# Patient Record
Sex: FEMALE | Race: WHITE | NOT HISPANIC OR LATINO | Employment: OTHER | URBAN - METROPOLITAN AREA
[De-identification: names, ages, dates, MRNs, and addresses within clinical notes are randomized per-mention and may not be internally consistent; named-entity substitution may affect disease eponyms.]

---

## 2020-06-25 ENCOUNTER — APPOINTMENT (EMERGENCY)
Dept: RADIOLOGY | Facility: HOSPITAL | Age: 75
DRG: 441 | End: 2020-06-25
Payer: MEDICARE

## 2020-06-25 ENCOUNTER — HOSPITAL ENCOUNTER (INPATIENT)
Facility: HOSPITAL | Age: 75
LOS: 8 days | Discharge: NURSING, REHAB OR LONG-TERM CARE | DRG: 441 | End: 2020-07-03
Attending: EMERGENCY MEDICINE | Admitting: INTERNAL MEDICINE
Payer: MEDICARE

## 2020-06-25 DIAGNOSIS — T39.1X1A TYLENOL OVERDOSE, ACCIDENTAL OR UNINTENTIONAL, INITIAL ENCOUNTER: ICD-10-CM

## 2020-06-25 DIAGNOSIS — F41.9 ANXIETY DISORDER: ICD-10-CM

## 2020-06-25 DIAGNOSIS — N17.9 AKI (ACUTE KIDNEY INJURY) (HCC): ICD-10-CM

## 2020-06-25 DIAGNOSIS — R60.9 SWELLING: ICD-10-CM

## 2020-06-25 DIAGNOSIS — S91.109S WOUND, OPEN, TOE WITH COMPLICATION, SEQUELA: ICD-10-CM

## 2020-06-25 DIAGNOSIS — L97.519 FOOT ULCER, RIGHT, WITH UNSPECIFIED SEVERITY (HCC): ICD-10-CM

## 2020-06-25 DIAGNOSIS — S92.919A FRACTURE, TOE: ICD-10-CM

## 2020-06-25 DIAGNOSIS — R79.89 ELEVATED LFTS: ICD-10-CM

## 2020-06-25 DIAGNOSIS — F32.A DEPRESSION: ICD-10-CM

## 2020-06-25 DIAGNOSIS — R09.02 HYPOXIA: ICD-10-CM

## 2020-06-25 DIAGNOSIS — R74.01 TRANSAMINITIS: ICD-10-CM

## 2020-06-25 DIAGNOSIS — J81.1 PULMONARY EDEMA: Primary | ICD-10-CM

## 2020-06-25 DIAGNOSIS — R41.89 IMPAIRED COGNITION: ICD-10-CM

## 2020-06-25 DIAGNOSIS — R77.8 ELEVATED TROPONIN: ICD-10-CM

## 2020-06-25 PROBLEM — K72.00 ACUTE HEPATIC ENCEPHALOPATHY: Status: ACTIVE | Noted: 2020-06-25

## 2020-06-25 PROBLEM — G93.40 ENCEPHALOPATHY: Status: ACTIVE | Noted: 2020-06-25

## 2020-06-25 PROBLEM — R79.1 ELEVATED INR: Status: ACTIVE | Noted: 2020-06-25

## 2020-06-25 PROBLEM — J96.01 ACUTE RESPIRATORY FAILURE WITH HYPOXIA AND HYPERCAPNIA (HCC): Status: ACTIVE | Noted: 2020-06-25

## 2020-06-25 PROBLEM — J96.02 ACUTE RESPIRATORY FAILURE WITH HYPOXIA AND HYPERCAPNIA (HCC): Status: ACTIVE | Noted: 2020-06-25

## 2020-06-25 PROBLEM — I50.9 ACUTE CONGESTIVE HEART FAILURE (HCC): Status: ACTIVE | Noted: 2020-06-25

## 2020-06-25 PROBLEM — D72.829 LEUKOCYTOSIS: Status: ACTIVE | Noted: 2020-06-25

## 2020-06-25 LAB
ALBUMIN SERPL BCP-MCNC: 3.4 G/DL (ref 3.5–5)
ALP SERPL-CCNC: 111 U/L (ref 46–116)
ALT SERPL W P-5'-P-CCNC: 2164 U/L (ref 12–78)
AMMONIA PLAS-SCNC: 18 UMOL/L (ref 11–35)
ANION GAP SERPL CALCULATED.3IONS-SCNC: 13 MMOL/L (ref 4–13)
APAP SERPL-MCNC: <2 UG/ML (ref 10–20)
AST SERPL W P-5'-P-CCNC: 3174 U/L (ref 5–45)
ATRIAL RATE: 100 BPM
ATRIAL RATE: 95 BPM
BACTERIA UR QL AUTO: ABNORMAL /HPF
BASE EX.OXY STD BLDV CALC-SCNC: 73.1 % (ref 60–80)
BASE EXCESS BLDV CALC-SCNC: -2.7 MMOL/L
BASOPHILS # BLD AUTO: 0.02 THOUSANDS/ΜL (ref 0–0.1)
BASOPHILS NFR BLD AUTO: 0 % (ref 0–1)
BILIRUB SERPL-MCNC: 0.69 MG/DL (ref 0.2–1)
BILIRUB UR QL STRIP: NEGATIVE
BUN SERPL-MCNC: 51 MG/DL (ref 5–25)
CALCIUM SERPL-MCNC: 8.1 MG/DL (ref 8.3–10.1)
CHLORIDE SERPL-SCNC: 101 MMOL/L (ref 100–108)
CK MB SERPL-MCNC: 1.1 % (ref 0–2.5)
CK MB SERPL-MCNC: 13.7 NG/ML (ref 0–5)
CK SERPL-CCNC: 1194 U/L (ref 26–192)
CLARITY UR: CLEAR
CO2 SERPL-SCNC: 24 MMOL/L (ref 21–32)
COLOR UR: YELLOW
COLOR, POC: NORMAL
CREAT SERPL-MCNC: 2.48 MG/DL (ref 0.6–1.3)
EOSINOPHIL # BLD AUTO: 0 THOUSAND/ΜL (ref 0–0.61)
EOSINOPHIL NFR BLD AUTO: 0 % (ref 0–6)
ERYTHROCYTE [DISTWIDTH] IN BLOOD BY AUTOMATED COUNT: 13.7 % (ref 11.6–15.1)
ETHANOL SERPL-MCNC: <3 MG/DL (ref 0–3)
FINE GRAN CASTS URNS QL MICRO: ABNORMAL /LPF
GFR SERPL CREATININE-BSD FRML MDRD: 18 ML/MIN/1.73SQ M
GLUCOSE SERPL-MCNC: 160 MG/DL (ref 65–140)
GLUCOSE SERPL-MCNC: 163 MG/DL (ref 65–140)
GLUCOSE UR STRIP-MCNC: NEGATIVE MG/DL
HCO3 BLDV-SCNC: 25 MMOL/L (ref 24–30)
HCT VFR BLD AUTO: 39.2 % (ref 34.8–46.1)
HGB BLD-MCNC: 12.6 G/DL (ref 11.5–15.4)
HGB UR QL STRIP.AUTO: ABNORMAL
HYALINE CASTS #/AREA URNS LPF: ABNORMAL /LPF
IMM GRANULOCYTES # BLD AUTO: 0.23 THOUSAND/UL (ref 0–0.2)
IMM GRANULOCYTES NFR BLD AUTO: 1 % (ref 0–2)
INR PPP: 1.53 (ref 0.84–1.19)
KETONES UR STRIP-MCNC: NEGATIVE MG/DL
LACTATE SERPL-SCNC: 1.9 MMOL/L (ref 0.5–2)
LACTATE SERPL-SCNC: 3.8 MMOL/L (ref 0.5–2)
LEUKOCYTE ESTERASE UR QL STRIP: NEGATIVE
LYMPHOCYTES # BLD AUTO: 1.85 THOUSANDS/ΜL (ref 0.6–4.47)
LYMPHOCYTES NFR BLD AUTO: 8 % (ref 14–44)
MCH RBC QN AUTO: 32.1 PG (ref 26.8–34.3)
MCHC RBC AUTO-ENTMCNC: 32.1 G/DL (ref 31.4–37.4)
MCV RBC AUTO: 100 FL (ref 82–98)
MONOCYTES # BLD AUTO: 1.46 THOUSAND/ΜL (ref 0.17–1.22)
MONOCYTES NFR BLD AUTO: 6 % (ref 4–12)
NEUTROPHILS # BLD AUTO: 20.3 THOUSANDS/ΜL (ref 1.85–7.62)
NEUTS SEG NFR BLD AUTO: 85 % (ref 43–75)
NITRITE UR QL STRIP: NEGATIVE
NON-SQ EPI CELLS URNS QL MICRO: ABNORMAL /HPF
NRBC BLD AUTO-RTO: 0 /100 WBCS
NT-PROBNP SERPL-MCNC: ABNORMAL PG/ML
O2 CT BLDV-SCNC: 14 ML/DL
P AXIS: 58 DEGREES
P AXIS: 63 DEGREES
PCO2 BLDV: 55.8 MM HG (ref 42–50)
PH BLDV: 7.27 [PH] (ref 7.3–7.4)
PH UR STRIP.AUTO: 6 [PH] (ref 4.5–8)
PLATELET # BLD AUTO: 290 THOUSANDS/UL (ref 149–390)
PMV BLD AUTO: 12.5 FL (ref 8.9–12.7)
PO2 BLDV: 49.8 MM HG (ref 35–45)
POTASSIUM SERPL-SCNC: 3.5 MMOL/L (ref 3.5–5.3)
PR INTERVAL: 122 MS
PR INTERVAL: 128 MS
PROT SERPL-MCNC: 8.2 G/DL (ref 6.4–8.2)
PROT UR STRIP-MCNC: >=300 MG/DL
PROTHROMBIN TIME: 18.4 SECONDS (ref 11.6–14.5)
QRS AXIS: 79 DEGREES
QRS AXIS: 85 DEGREES
QRSD INTERVAL: 78 MS
QRSD INTERVAL: 78 MS
QT INTERVAL: 406 MS
QT INTERVAL: 424 MS
QTC INTERVAL: 523 MS
QTC INTERVAL: 532 MS
RBC # BLD AUTO: 3.92 MILLION/UL (ref 3.81–5.12)
RBC #/AREA URNS AUTO: ABNORMAL /HPF
SALICYLATES SERPL-MCNC: <3 MG/DL (ref 3–20)
SARS-COV-2 RNA RESP QL NAA+PROBE: NEGATIVE
SODIUM SERPL-SCNC: 138 MMOL/L (ref 136–145)
SP GR UR STRIP.AUTO: >=1.03 (ref 1–1.03)
T WAVE AXIS: 237 DEGREES
T WAVE AXIS: 237 DEGREES
TROPONIN I SERPL-MCNC: 10.2 NG/ML
TROPONIN I SERPL-MCNC: 11 NG/ML
TSH SERPL DL<=0.05 MIU/L-ACNC: 2.12 UIU/ML (ref 0.36–3.74)
UROBILINOGEN UR QL STRIP.AUTO: 0.2 E.U./DL
VENTRICULAR RATE: 100 BPM
VENTRICULAR RATE: 95 BPM
WBC # BLD AUTO: 23.86 THOUSAND/UL (ref 4.31–10.16)
WBC #/AREA URNS AUTO: ABNORMAL /HPF

## 2020-06-25 PROCEDURE — 99285 EMERGENCY DEPT VISIT HI MDM: CPT

## 2020-06-25 PROCEDURE — 82805 BLOOD GASES W/O2 SATURATION: CPT | Performed by: EMERGENCY MEDICINE

## 2020-06-25 PROCEDURE — 71250 CT THORAX DX C-: CPT

## 2020-06-25 PROCEDURE — U0003 INFECTIOUS AGENT DETECTION BY NUCLEIC ACID (DNA OR RNA); SEVERE ACUTE RESPIRATORY SYNDROME CORONAVIRUS 2 (SARS-COV-2) (CORONAVIRUS DISEASE [COVID-19]), AMPLIFIED PROBE TECHNIQUE, MAKING USE OF HIGH THROUGHPUT TECHNOLOGIES AS DESCRIBED BY CMS-2020-01-R: HCPCS | Performed by: EMERGENCY MEDICINE

## 2020-06-25 PROCEDURE — 99291 CRITICAL CARE FIRST HOUR: CPT | Performed by: EMERGENCY MEDICINE

## 2020-06-25 PROCEDURE — 82550 ASSAY OF CK (CPK): CPT | Performed by: EMERGENCY MEDICINE

## 2020-06-25 PROCEDURE — 83605 ASSAY OF LACTIC ACID: CPT | Performed by: EMERGENCY MEDICINE

## 2020-06-25 PROCEDURE — 84484 ASSAY OF TROPONIN QUANT: CPT | Performed by: EMERGENCY MEDICINE

## 2020-06-25 PROCEDURE — 82948 REAGENT STRIP/BLOOD GLUCOSE: CPT

## 2020-06-25 PROCEDURE — 70450 CT HEAD/BRAIN W/O DYE: CPT

## 2020-06-25 PROCEDURE — 99223 1ST HOSP IP/OBS HIGH 75: CPT | Performed by: INTERNAL MEDICINE

## 2020-06-25 PROCEDURE — 85610 PROTHROMBIN TIME: CPT | Performed by: EMERGENCY MEDICINE

## 2020-06-25 PROCEDURE — 84443 ASSAY THYROID STIM HORMONE: CPT | Performed by: EMERGENCY MEDICINE

## 2020-06-25 PROCEDURE — 82140 ASSAY OF AMMONIA: CPT | Performed by: EMERGENCY MEDICINE

## 2020-06-25 PROCEDURE — 93010 ELECTROCARDIOGRAM REPORT: CPT | Performed by: INTERNAL MEDICINE

## 2020-06-25 PROCEDURE — 74176 CT ABD & PELVIS W/O CONTRAST: CPT

## 2020-06-25 PROCEDURE — 94002 VENT MGMT INPAT INIT DAY: CPT

## 2020-06-25 PROCEDURE — 82553 CREATINE MB FRACTION: CPT | Performed by: EMERGENCY MEDICINE

## 2020-06-25 PROCEDURE — 80053 COMPREHEN METABOLIC PANEL: CPT | Performed by: EMERGENCY MEDICINE

## 2020-06-25 PROCEDURE — 96361 HYDRATE IV INFUSION ADD-ON: CPT

## 2020-06-25 PROCEDURE — 83880 ASSAY OF NATRIURETIC PEPTIDE: CPT | Performed by: PHYSICIAN ASSISTANT

## 2020-06-25 PROCEDURE — 94760 N-INVAS EAR/PLS OXIMETRY 1: CPT

## 2020-06-25 PROCEDURE — 80320 DRUG SCREEN QUANTALCOHOLS: CPT | Performed by: EMERGENCY MEDICINE

## 2020-06-25 PROCEDURE — 36415 COLL VENOUS BLD VENIPUNCTURE: CPT | Performed by: EMERGENCY MEDICINE

## 2020-06-25 PROCEDURE — 93005 ELECTROCARDIOGRAM TRACING: CPT

## 2020-06-25 PROCEDURE — 81001 URINALYSIS AUTO W/SCOPE: CPT

## 2020-06-25 PROCEDURE — 85025 COMPLETE CBC W/AUTO DIFF WBC: CPT | Performed by: EMERGENCY MEDICINE

## 2020-06-25 PROCEDURE — 80329 ANALGESICS NON-OPIOID 1 OR 2: CPT | Performed by: EMERGENCY MEDICINE

## 2020-06-25 PROCEDURE — 96374 THER/PROPH/DIAG INJ IV PUSH: CPT

## 2020-06-25 RX ORDER — METOPROLOL TARTRATE 50 MG/1
100 TABLET, FILM COATED ORAL EVERY 12 HOURS SCHEDULED
Status: DISCONTINUED | OUTPATIENT
Start: 2020-06-25 | End: 2020-06-29

## 2020-06-25 RX ORDER — LEVOTHYROXINE SODIUM 0.12 MG/1
125 TABLET ORAL
Status: DISCONTINUED | OUTPATIENT
Start: 2020-06-26 | End: 2020-07-03 | Stop reason: HOSPADM

## 2020-06-25 RX ORDER — ONDANSETRON 2 MG/ML
4 INJECTION INTRAMUSCULAR; INTRAVENOUS EVERY 6 HOURS PRN
Status: DISCONTINUED | OUTPATIENT
Start: 2020-06-25 | End: 2020-07-03 | Stop reason: HOSPADM

## 2020-06-25 RX ORDER — CIPROFLOXACIN 750 MG/1
750 TABLET, FILM COATED ORAL EVERY 24 HOURS
COMMUNITY
End: 2020-07-03 | Stop reason: HOSPADM

## 2020-06-25 RX ORDER — FUROSEMIDE 10 MG/ML
40 INJECTION INTRAMUSCULAR; INTRAVENOUS ONCE
Status: COMPLETED | OUTPATIENT
Start: 2020-06-25 | End: 2020-06-26

## 2020-06-25 RX ORDER — AMLODIPINE BESYLATE 5 MG/1
5 TABLET ORAL DAILY
Status: DISCONTINUED | OUTPATIENT
Start: 2020-06-26 | End: 2020-06-29

## 2020-06-25 RX ORDER — LEVOTHYROXINE SODIUM 0.12 MG/1
125 TABLET ORAL DAILY
COMMUNITY

## 2020-06-25 RX ORDER — ATORVASTATIN CALCIUM 10 MG/1
10 TABLET, FILM COATED ORAL DAILY
COMMUNITY
End: 2020-07-03 | Stop reason: HOSPADM

## 2020-06-25 RX ORDER — METOPROLOL TARTRATE 100 MG/1
100 TABLET ORAL EVERY 12 HOURS SCHEDULED
COMMUNITY
End: 2020-07-03 | Stop reason: HOSPADM

## 2020-06-25 RX ORDER — AMLODIPINE BESYLATE 5 MG/1
5 TABLET ORAL DAILY
COMMUNITY
End: 2020-07-03 | Stop reason: HOSPADM

## 2020-06-25 RX ORDER — LANOLIN ALCOHOL/MO/W.PET/CERES
3 CREAM (GRAM) TOPICAL
Status: DISCONTINUED | OUTPATIENT
Start: 2020-06-25 | End: 2020-06-28

## 2020-06-25 RX ORDER — HEPARIN SODIUM 1000 [USP'U]/ML
4000 INJECTION, SOLUTION INTRAVENOUS; SUBCUTANEOUS ONCE
Status: COMPLETED | OUTPATIENT
Start: 2020-06-25 | End: 2020-06-25

## 2020-06-25 RX ORDER — SODIUM CHLORIDE, SODIUM GLUCONATE, SODIUM ACETATE, POTASSIUM CHLORIDE, MAGNESIUM CHLORIDE, SODIUM PHOSPHATE, DIBASIC, AND POTASSIUM PHOSPHATE .53; .5; .37; .037; .03; .012; .00082 G/100ML; G/100ML; G/100ML; G/100ML; G/100ML; G/100ML; G/100ML
100 INJECTION, SOLUTION INTRAVENOUS CONTINUOUS
Status: DISCONTINUED | OUTPATIENT
Start: 2020-06-25 | End: 2020-06-26

## 2020-06-25 RX ORDER — TRAZODONE HYDROCHLORIDE 100 MG/1
200 TABLET ORAL
COMMUNITY
End: 2020-07-03 | Stop reason: HOSPADM

## 2020-06-25 RX ORDER — MAGNESIUM HYDROXIDE/ALUMINUM HYDROXICE/SIMETHICONE 120; 1200; 1200 MG/30ML; MG/30ML; MG/30ML
5 SUSPENSION ORAL EVERY 6 HOURS PRN
Status: DISCONTINUED | OUTPATIENT
Start: 2020-06-25 | End: 2020-07-03 | Stop reason: HOSPADM

## 2020-06-25 RX ORDER — HEPARIN SODIUM 10000 [USP'U]/100ML
3-20 INJECTION, SOLUTION INTRAVENOUS
Status: DISCONTINUED | OUTPATIENT
Start: 2020-06-25 | End: 2020-06-25

## 2020-06-25 RX ORDER — HEPARIN SODIUM 5000 [USP'U]/ML
5000 INJECTION, SOLUTION INTRAVENOUS; SUBCUTANEOUS EVERY 8 HOURS SCHEDULED
Status: DISCONTINUED | OUTPATIENT
Start: 2020-06-25 | End: 2020-07-03 | Stop reason: HOSPADM

## 2020-06-25 RX ADMIN — HEPARIN SODIUM 4000 UNITS: 1000 INJECTION INTRAVENOUS; SUBCUTANEOUS at 21:41

## 2020-06-25 RX ADMIN — SODIUM CHLORIDE 1000 ML: 0.9 INJECTION, SOLUTION INTRAVENOUS at 17:09

## 2020-06-25 RX ADMIN — SODIUM CHLORIDE, SODIUM GLUCONATE, SODIUM ACETATE, POTASSIUM CHLORIDE, MAGNESIUM CHLORIDE, SODIUM PHOSPHATE, DIBASIC, AND POTASSIUM PHOSPHATE 100 ML/HR: .53; .5; .37; .037; .03; .012; .00082 INJECTION, SOLUTION INTRAVENOUS at 23:43

## 2020-06-25 RX ADMIN — ACETYLCYSTEINE 11740 MG: 200 INJECTION, SOLUTION INTRAVENOUS at 23:32

## 2020-06-25 NOTE — ED ATTENDING ATTESTATION
6/25/2020  ICyndi MD, saw and evaluated the patient  I have discussed the patient with the resident/non-physician practitioner and agree with the resident's/non-physician practitioner's findings, Plan of Care, and MDM as documented in the resident's/non-physician practitioner's note, except where noted  All available labs and Radiology studies were reviewed  I was present for key portions of any procedure(s) performed by the resident/non-physician practitioner and I was immediately available to provide assistance  At this point I agree with the current assessment done in the Emergency Department  I have conducted an independent evaluation of this patient a history and physical is as follows:    ED Course         Critical Care Time  CriticalCare Time  Performed by: Cyndi Roche MD  Authorized by: Cyndi Roche MD     Critical care provider statement:     Critical care time (minutes):  33    Critical care was necessary to treat or prevent imminent or life-threatening deterioration of the following conditions:  Respiratory failure and cardiac failure    Critical care was time spent personally by me on the following activities:  Obtaining history from patient or surrogate, development of treatment plan with patient or surrogate, review of old charts, ordering and review of laboratory studies and ordering and review of radiographic studies        65 yo female found by family unresponsive and cyanotic around lips, in respiratory distress  Pt was hypoxic and ems placed on cpap with improvement of mental status, oxygneation  Pt niece states she was recently admitted to [facility] for social reasons and has been home with her for a few weeks  Pt with hx of asthma, htn, depression but per niece all her pills are intact and no concern for overdose  Pt with no recent fever, n/v/d, complaints of cp, headache, sob until today    Discuss with pt daughter over phone her code status and she is full code   Vss, afebrile, lungs wheezing bilaterally, rrr, abodmen soft nontender, no pedal edema, no focal neuro deficits  Cardiac workup, ct pe study, ekg with t wave inversions and troponin elevated, vbg, bipap

## 2020-06-25 NOTE — ED PROVIDER NOTES
History  Chief Complaint   Patient presents with    Shortness of Breath     pt coming form home, found cyanotic and unresponsive      Patient presents for evaluation of hypoxia, decreased responsiveness, and cyanosis  History is provided by patient's niece who is bedside  She states that the patient was discharged from another hospital couple weeks ago and she has been helping her get back on her feet  Yesterday she did not see the patient but states that is normal for her to sleep throughout the whole day  When she discovered her this afternoon she was minimally responsive and appeared blue  She then called EMS  EMS reports that the patient was saturating at approximately 80% when they arrived, grossly cyanotic on admit minimally responsive  They then applied nasal cannula and the patient's saturations slowly increased  Her mental status improved as her oxygen levels rays  A eventually applied CPAP and were able to increase the patient's oxygen saturation to approximately 90%  Upon her arrival here the patient has no complaints but is minimally interactive  Prior to Admission Medications   Prescriptions Last Dose Informant Patient Reported? Taking? amLODIPine (NORVASC) 5 mg tablet   Yes Yes   Sig: Take 5 mg by mouth daily   atorvastatin (LIPITOR) 10 mg tablet   Yes Yes   Sig: Take 10 mg by mouth daily   ciprofloxacin (CIPRO) 750 mg tablet   Yes Yes   Sig: Take 750 mg by mouth every 24 hours   levothyroxine 125 mcg tablet   Yes Yes   Sig: Take 125 mcg by mouth daily   metoprolol tartrate (LOPRESSOR) 100 mg tablet   Yes Yes   Sig: Take 100 mg by mouth every 12 (twelve) hours   traZODone (DESYREL) 100 mg tablet   Yes Yes   Sig: Take 200 mg by mouth daily at bedtime      Facility-Administered Medications: None       Past Medical History:   Diagnosis Date    Disease of thyroid gland     Hyperlipemia     Hypertension        History reviewed  No pertinent surgical history  History reviewed   No pertinent family history  I have reviewed and agree with the history as documented  E-Cigarette/Vaping    E-Cigarette Use Never User      E-Cigarette/Vaping Substances     Social History     Tobacco Use    Smoking status: Never Smoker    Smokeless tobacco: Never Used   Substance Use Topics    Alcohol use: Not Currently    Drug use: Never        Review of Systems   Unable to perform ROS: Acuity of condition       Physical Exam  ED Triage Vitals   Temperature Pulse Respirations Blood Pressure SpO2   06/25/20 1541 06/25/20 1534 06/25/20 1534 06/25/20 1534 06/25/20 1530   99 1 °F (37 3 °C) 98 (!) 26 158/84 96 %      Temp Source Heart Rate Source Patient Position - Orthostatic VS BP Location FiO2 (%)   06/25/20 1541 06/25/20 1534 06/25/20 1534 06/25/20 1534 --   Tympanic Monitor Lying Right arm       Pain Score       06/25/20 2357       No Pain             Orthostatic Vital Signs  Vitals:    06/25/20 2030 06/25/20 2100 06/25/20 2200 06/25/20 2326   BP: 147/72 153/74 158/75 130/86   Pulse: 92 91 92 93   Patient Position - Orthostatic VS:           Physical Exam   Constitutional: She appears well-nourished  No distress  HENT:   Head: Normocephalic and atraumatic  Right Ear: External ear normal    Left Ear: External ear normal    Mouth/Throat: Oropharynx is clear and moist    No obvious signs of head trauma   Eyes: Pupils are equal, round, and reactive to light  Conjunctivae and EOM are normal  Right eye exhibits no discharge  Left eye exhibits no discharge  No scleral icterus  Neck: Normal range of motion  Neck supple  No JVD present  Cardiovascular: Regular rhythm, normal heart sounds and intact distal pulses  Tachycardia present  Exam reveals no gallop and no friction rub  No murmur heard  Pulmonary/Chest: Effort normal  No respiratory distress  She has no wheezes  She has rhonchi in the right upper field and the right middle field  She has no rales  Abdominal: Soft   Bowel sounds are normal  She exhibits no distension and no mass  There is no tenderness  There is no guarding  Musculoskeletal: Normal range of motion  She exhibits no tenderness or deformity  Neurological: No cranial nerve deficit or sensory deficit  She exhibits normal muscle tone  Coordination normal    Patient will follow commands when prompted multiple times, as mental status improved could answer questions  No obvious focal neural deficits   Skin: Skin is warm  She is not diaphoretic  Psychiatric: She has a normal mood and affect  Her behavior is normal  Judgment and thought content normal    Vitals reviewed        ED Medications  Medications   amLODIPine (NORVASC) tablet 5 mg (has no administration in time range)   levothyroxine tablet 125 mcg (125 mcg Oral Given 6/26/20 0637)   metoprolol tartrate (LOPRESSOR) tablet 100 mg (100 mg Oral Given 6/26/20 0000)   melatonin tablet 3 mg (3 mg Oral Given 6/26/20 0032)   multi-electrolyte (PLASMALYTE-A/ISOLYTE-S PH 7 4) IV solution (100 mL/hr Intravenous New Bag 6/25/20 2343)   ondansetron (ZOFRAN) injection 4 mg (has no administration in time range)   aluminum-magnesium hydroxide-simethicone (MYLANTA) 200-200-20 mg/5 mL oral suspension 5 mL (has no administration in time range)   heparin (porcine) subcutaneous injection 5,000 Units (5,000 Units Subcutaneous Given 6/26/20 0637)   acetylcysteine (ACETADOTE) 7,840 mg in dextrose 5 % 1,000 mL IVPB (7,840 mg Intravenous New Bag 6/26/20 0639)   sodium chloride 0 9 % bolus 1,000 mL (0 mL Intravenous Stopped 6/25/20 1844)   heparin (porcine) injection 4,000 Units (4,000 Units Intravenous Given 6/25/20 2141)   furosemide (LASIX) injection 40 mg (40 mg Intravenous Given 6/26/20 0033)   acetylcysteine (ACETADOTE) 11,740 mg in dextrose 5 % 200 mL IVPB (11,740 mg Intravenous New Bag 6/25/20 2332)   acetylcysteine (ACETADOTE) 3,920 mg in dextrose 5 % 500 mL IVPB (3,920 mg Intravenous New Bag 6/26/20 0046)       Diagnostic Studies  Results Reviewed Procedure Component Value Units Date/Time    Ammonia [[id number]]  (Normal) Collected:  06/25/20 2233    Lab Status:  Final result Specimen:  Blood from Arm, Left Updated:  06/25/20 2303     Ammonia 18 umol/L     CKMB [[id number]]  (Abnormal) Collected:  06/25/20 1532    Lab Status:  Final result Specimen:  Blood from Arm, Left Updated:  06/25/20 2238     CK-MB Index 1 1 %      CK-MB 13 7 ng/mL     CK (with reflex to MB) [[id number]]  (Abnormal) Collected:  06/25/20 1532    Lab Status:  Final result Specimen:  Blood from Arm, Left Updated:  06/25/20 2224     Total CK 1,194 U/L     TSH, 3rd generation with Free T4 reflex [[id number]]  (Normal) Collected:  06/25/20 1532    Lab Status:  Final result Specimen:  Blood from Arm, Left Updated:  06/25/20 2224     TSH 3RD GENERATON 2 120 uIU/mL     Narrative:       Patients undergoing fluorescein dye angiography may retain small amounts of fluorescein in the body for 48-72 hours post procedure  Samples containing fluorescein can produce falsely depressed TSH values  If the patient had this procedure,a specimen should be resubmitted post fluorescein clearance        NT-BNP PRO [[id number]]  (Abnormal) Collected:  06/25/20 1532    Lab Status:  Final result Specimen:  Blood from Arm, Left Updated:  06/25/20 2224     NT-proBNP 82,271 pg/mL     Protime-INR [[id number]]  (Abnormal) Collected:  06/25/20 1532    Lab Status:  Final result Specimen:  Blood from Arm, Left Updated:  06/25/20 2117     Protime 18 4 seconds      INR 1 53    Urine Microscopic [[id number]]  (Abnormal) Collected:  06/25/20 1939    Lab Status:  Final result Specimen:  Urine, Clean Catch Updated:  06/25/20 2052     RBC, UA 2-4 /hpf      WBC, UA 4-10 /hpf      Epithelial Cells Occasional /hpf      Bacteria, UA Occasional /hpf      Hyaline Casts, UA 5-10 /lpf      Fine granular casts 3-5 /lpf     POCT urinalysis dipstick [[id number]]  (Normal) Resulted:  06/25/20 1940    Lab Status:  Final result Specimen:  Urine Updated: 06/25/20 1940     Color, UA -    Urine Macroscopic, POC [[id number]]  (Abnormal) Collected:  06/25/20 1939    Lab Status:  Final result Specimen:  Urine Updated:  06/25/20 1939     Color, UA Yellow     Clarity, UA Clear     pH, UA 6 0     Leukocytes, UA Negative     Nitrite, UA Negative     Protein, UA >=300 mg/dl      Glucose, UA Negative mg/dl      Ketones, UA Negative mg/dl      Urobilinogen, UA 0 2 E U /dl      Bilirubin, UA Negative     Blood, UA Moderate     Specific Gravity, UA >=1 030    Narrative:       CLINITEK RESULT    Lactic acid 2 Hours [[id number]]  (Normal) Collected:  06/25/20 1844    Lab Status:  Final result Specimen:  Blood from Arm, Left Updated:  06/25/20 1932     LACTIC ACID 1 9 mmol/L     Narrative:       Result may be elevated if tourniquet was used during collection  Troponin I [[id number]]  (Abnormal) Collected:  06/25/20 1807    Lab Status:  Final result Specimen:  Blood from Arm, Left Updated:  06/25/20 1844     Troponin I 10 20 ng/mL     [facility]Covid-[facility] [[id number]]  (Normal) Collected:  06/25/20 1649    Lab Status:  Final result Specimen:  Nares from Nose Updated:  06/25/20 1840     SARS-CoV-2 Negative    Narrative: The specimen collection materials, transport medium, and/or testing methodology utilized in the production of these test results have been proven to be reliable in a limited validation with an abbreviated program under the Emergency Utilization Authorization provided by the FDA  Testing reported as "Presumptive positive" will be confirmed with secondary testing with a reference laboratory to ensure result accuracy  Clinical caution and judgement should be used with the interpretation of these results with consideration of the clinical impression and other laboratory testing  Testing reported as "Positive" or "Negative" has been proven to be accurate according to standard laboratory validation requirements    All testing is performed with control materials showing appropriate reactivity at standard intervals  Ethanol [[id number]]  (Normal) Collected:  06/25/20 1649    Lab Status:  Final result Specimen:  Blood from Arm, Left Updated:  06/25/20 1757     Ethanol Lvl <3 mg/dL     Salicylate level [[id number]]  (Abnormal) Collected:  06/25/20 1649    Lab Status:  Final result Specimen:  Blood from Arm, Left Updated:  32/82/36 7310     Salicylate Lvl <3 mg/dL     Acetaminophen level-If concentration is detectable, please discuss with medical  on call  [[id number]]  (Abnormal) Collected:  06/25/20 1649    Lab Status:  Final result Specimen:  Blood from Arm, Left Updated:  06/25/20 1733     Acetaminophen Level <2 ug/mL     Lactic acid, plasma [[id number]]  (Abnormal) Collected:  06/25/20 1649    Lab Status:  Final result Specimen:  Blood from Arm, Left Updated:  06/25/20 1726     LACTIC ACID 3 8 mmol/L     Narrative:       Result may be elevated if tourniquet was used during collection      CBC and differential [[id number]]  (Abnormal) Collected:  06/25/20 1532    Lab Status:  Final result Specimen:  Blood from Arm, Left Updated:  06/25/20 1654     WBC 23 86 Thousand/uL      RBC 3 92 Million/uL      Hemoglobin 12 6 g/dL      Hematocrit 39 2 %       fL      MCH 32 1 pg      MCHC 32 1 g/dL      RDW 13 7 %      MPV 12 5 fL      Platelets 965 Thousands/uL      nRBC 0 /100 WBCs      Neutrophils Relative 85 %      Immat GRANS % 1 %      Lymphocytes Relative 8 %      Monocytes Relative 6 %      Eosinophils Relative 0 %      Basophils Relative 0 %      Neutrophils Absolute 20 30 Thousands/µL      Immature Grans Absolute 0 23 Thousand/uL      Lymphocytes Absolute 1 85 Thousands/µL      Monocytes Absolute 1 46 Thousand/µL      Eosinophils Absolute 0 00 Thousand/µL      Basophils Absolute 0 02 Thousands/µL     Comprehensive metabolic panel [[id number]]  (Abnormal) Collected:  06/25/20 1532    Lab Status:  Final result Specimen:  Blood from Arm, Left Updated:  06/25/20 1623     Sodium 138 mmol/L      Potassium 3 5 mmol/L      Chloride 101 mmol/L      CO2 24 mmol/L      ANION GAP 13 mmol/L      BUN 51 mg/dL      Creatinine 2 48 mg/dL      Glucose 163 mg/dL      Calcium 8 1 mg/dL      AST 3,174 U/L      ALT 2,164 U/L      Alkaline Phosphatase 111 U/L      Total Protein 8 2 g/dL      Albumin 3 4 g/dL      Total Bilirubin 0 69 mg/dL      eGFR 18 ml/min/1 73sq m     Narrative:       [place] guidelines for Chronic Kidney Disease (CKD):     Stage 1 with normal or high GFR (GFR > 90 mL/min/1 73 square meters)    Stage 2 Mild CKD (GFR = 60-89 mL/min/1 73 square meters)    Stage 3A Moderate CKD (GFR = 45-59 mL/min/1 73 square meters)    Stage 3B Moderate CKD (GFR = 30-44 mL/min/1 73 square meters)    Stage 4 Severe CKD (GFR = 15-29 mL/min/1 73 square meters)    Stage 5 End Stage CKD (GFR <15 mL/min/1 73 square meters)  Note: GFR calculation is accurate only with a steady state creatinine    Troponin I [[id number]]  (Abnormal) Collected:  06/25/20 1532    Lab Status:  Final result Specimen:  Blood from Arm, Left Updated:  06/25/20 1610     Troponin I 11 00 ng/mL     Blood gas, venous [[id number]]  (Abnormal) Collected:  06/25/20 1532    Lab Status:  Final result Specimen:  Blood from Arm, Left Updated:  06/25/20 1545     pH, Mike 7 270     pCO2, Mike 55 8 mm Hg      pO2, Mike 49 8 mm Hg      HCO3, Mike 25 0 mmol/L      Base Excess, Mike -2 7 mmol/L      O2 Content, Mike 14 0 ml/dL      O2 HGB, VENOUS 73 1 %     Fingerstick Glucose (POCT) [[id number]]  (Abnormal) Collected:  06/25/20 1531    Lab Status:  Final result Updated:  06/25/20 1531     POC Glucose 160 mg/dl                  CT head wo contrast   Final Result by Sandra Snow MD (06/25 2026)      No acute intracranial abnormality                    Workstation performed: [id number]         CT chest abdomen pelvis wo contrast   Final Result by Sandra Snow MD (06/25 2037)      Bilateral centrally oriented alveolar opacities likely representing pulmonary edema though pneumonia is not entirely excluded  Small bilateral pleural effusions  Elevated right hemidiaphragm with associated right lung base atelectasis  No acute intra-abdominal abnormality  Nonobstructing right renal pelvis calculus  Workstation performed: [id number]               Procedures  Procedures      ED Course  ED Course as of Jun 26 0641   Thu Jun 25, 2020   1644 Troponin I(!): 11 00   1644 Creatinine(!): 2 48   1703 WBC(!): 23 86   1703 AST(!): 3,174   1703 ALT(!): 2,164   1728 LACTIC ACID(!!): 3 8       US AUDIT      Most Recent Value   Initial Alcohol Screen: US AUDIT-C    1  How often do you have a drink containing alcohol?  0 Filed at: 06/25/2020 1538   2  How many drinks containing alcohol do you have on a typical day you are drinking? 0 Filed at: 06/25/2020 1538   3a  Male UNDER 65: How often do you have five or more drinks on one occasion? 0 Filed at: 06/25/2020 1538   3b  FEMALE Any Age, or MALE 65+: How often do you have 4 or more drinks on one occassion? 0 Filed at: 06/25/2020 1538   Audit-C Score  0 Filed at: 06/25/2020 1538              Identification of Seniors at Risk      Most Recent Value   (ISAR) Identification of Seniors at Risk   Before the illness or injury that brought you to the Emergency, did you need someone to help you on a regular basis? 1 Filed at: 06/25/2020 1538   In the last 24 hours, have you needed more help than usual?  0 Filed at: 06/25/2020 1538   Have you been hospitalized for one or more nights during the past 6 months? 0 Filed at: 06/25/2020 1538   In general, do you see well?  0 Filed at: 06/25/2020 1538   In general, do you have serious problems with your memory? 0 Filed at: 06/25/2020 1538   Do you take more than three different medications every day?   1 Filed at: 06/25/2020 1538   ISAR Score  2 Filed at: 06/25/2020 1538                                  MDM  Number of Diagnoses or Management Options  Elevated LFTs:   Elevated troponin:   Hypoxia:   Pulmonary edema:   Diagnosis management comments: Rate: 100, sinus rhythm, normal axis  Normal ME interval, normal QRS, QT interval within normal range  ST inversions in 2, 3, AVF  No diffuse elevations to indicate pericarditis  No biphasic T waves in precordial leads to indicate Wellens  Here in the emergency department EKG with no acute ischemic changes  Blood work with an elevated troponin, elevated liver enzymes, elevated white count without obvious source  Head CT was performed with no acute findings, a CT of the chest, abdomen, and pelvis showed pulmonary edema without other acute findings  Patient remained on BiPAP will here in the emergency department and her oxygen status steadily improved  Mentation improved as well  Patient appears to be in new onset congestive heart failure  Discussed with critical care who also evaluated patient, gave a dose of IV Lasix  Discussed patient with medicine and admitted for treatment and further monitoring of respiratory status              Disposition  Final diagnoses:   Pulmonary edema   Hypoxia   Elevated LFTs   Elevated troponin     Time reflects when diagnosis was documented in both MDM as applicable and the Disposition within this note     Time User Action Codes Description Comment    6/25/2020  9:45 PM Milus Dandy Add [J81 1] Pulmonary edema     6/25/2020  9:45 PM Milus Dandy Add [R09 02] Hypoxia     6/25/2020  9:45 PM Milus Dandy Add [R79 89] Elevated LFTs     6/25/2020  9:45 PM Milus Dandy Add [R79 89] Elevated troponin     6/25/2020  9:54 PM Dea Walker S Add [R74 0] Transaminitis     6/25/2020  9:54 PM Marcia Scruggs Add [N17 9] PHILIPPE (acute kidney injury) [facility])       ED Disposition     ED Disposition Condition Date/Time Comment    Admit Stable Thu Jun 25, 2020  9:45 PM Case was discussed with ARI and the patient's admission status was agreed to be Admission Status: inpatient status to the service of Dr Whitney Magallon  Follow-up Information    None         Current Discharge Medication List      CONTINUE these medications which have NOT CHANGED    Details   amLODIPine (NORVASC) 5 mg tablet Take 5 mg by mouth daily      atorvastatin (LIPITOR) 10 mg tablet Take 10 mg by mouth daily      ciprofloxacin (CIPRO) 750 mg tablet Take 750 mg by mouth every 24 hours      levothyroxine 125 mcg tablet Take 125 mcg by mouth daily      metoprolol tartrate (LOPRESSOR) 100 mg tablet Take 100 mg by mouth every 12 (twelve) hours      traZODone (DESYREL) 100 mg tablet Take 200 mg by mouth daily at bedtime           No discharge procedures on file  PDMP Review     None           ED Provider  Attending physically available and evaluated Gillian Mallory DAVIS managed the patient along with the ED Attending      Electronically Signed by         Ermelinda Marquez MD  06/26/20 2655

## 2020-06-26 ENCOUNTER — APPOINTMENT (INPATIENT)
Dept: NON INVASIVE DIAGNOSTICS | Facility: HOSPITAL | Age: 75
DRG: 441 | End: 2020-06-26
Payer: MEDICARE

## 2020-06-26 ENCOUNTER — APPOINTMENT (INPATIENT)
Dept: RADIOLOGY | Facility: HOSPITAL | Age: 75
DRG: 441 | End: 2020-06-26
Payer: MEDICARE

## 2020-06-26 PROBLEM — R22.31 LOCALIZED SWELLING ON RIGHT HAND: Status: ACTIVE | Noted: 2020-06-26

## 2020-06-26 PROBLEM — Z65.9 POOR SOCIAL SITUATION: Status: ACTIVE | Noted: 2020-06-26

## 2020-06-26 LAB
ALBUMIN SERPL BCP-MCNC: 2.8 G/DL (ref 3.5–5)
ALBUMIN SERPL BCP-MCNC: 2.8 G/DL (ref 3.5–5)
ALP SERPL-CCNC: 89 U/L (ref 46–116)
ALP SERPL-CCNC: 96 U/L (ref 46–116)
ALT SERPL W P-5'-P-CCNC: 1211 U/L (ref 12–78)
ALT SERPL W P-5'-P-CCNC: 1473 U/L (ref 12–78)
ANION GAP SERPL CALCULATED.3IONS-SCNC: 11 MMOL/L (ref 4–13)
ANION GAP SERPL CALCULATED.3IONS-SCNC: 12 MMOL/L (ref 4–13)
APTT PPP: 40 SECONDS (ref 23–37)
AST SERPL W P-5'-P-CCNC: 1293 U/L (ref 5–45)
AST SERPL W P-5'-P-CCNC: 732 U/L (ref 5–45)
BACTERIA UR QL AUTO: NORMAL /HPF
BASE EX.OXY STD BLDV CALC-SCNC: 96.6 % (ref 60–80)
BASE EXCESS BLDV CALC-SCNC: 1.9 MMOL/L
BASOPHILS # BLD AUTO: 0.05 THOUSANDS/ΜL (ref 0–0.1)
BASOPHILS NFR BLD AUTO: 0 % (ref 0–1)
BILIRUB SERPL-MCNC: 0.69 MG/DL (ref 0.2–1)
BILIRUB SERPL-MCNC: 0.76 MG/DL (ref 0.2–1)
BILIRUB UR QL STRIP: NEGATIVE
BUN SERPL-MCNC: 48 MG/DL (ref 5–25)
BUN SERPL-MCNC: 52 MG/DL (ref 5–25)
CALCIUM SERPL-MCNC: 7.8 MG/DL (ref 8.3–10.1)
CALCIUM SERPL-MCNC: 8 MG/DL (ref 8.3–10.1)
CHLORIDE SERPL-SCNC: 103 MMOL/L (ref 100–108)
CHLORIDE SERPL-SCNC: 104 MMOL/L (ref 100–108)
CK MB SERPL-MCNC: 1.6 % (ref 0–2.5)
CK MB SERPL-MCNC: 12 NG/ML (ref 0–5)
CK SERPL-CCNC: 746 U/L (ref 26–192)
CLARITY UR: CLEAR
CO2 SERPL-SCNC: 25 MMOL/L (ref 21–32)
CO2 SERPL-SCNC: 27 MMOL/L (ref 21–32)
COLOR UR: YELLOW
CREAT SERPL-MCNC: 1.63 MG/DL (ref 0.6–1.3)
CREAT SERPL-MCNC: 1.91 MG/DL (ref 0.6–1.3)
EOSINOPHIL # BLD AUTO: 0 THOUSAND/ΜL (ref 0–0.61)
EOSINOPHIL NFR BLD AUTO: 0 % (ref 0–6)
ERYTHROCYTE [DISTWIDTH] IN BLOOD BY AUTOMATED COUNT: 13.7 % (ref 11.6–15.1)
FERRITIN SERPL-MCNC: 4947 NG/ML (ref 8–388)
GFR SERPL CREATININE-BSD FRML MDRD: 25 ML/MIN/1.73SQ M
GFR SERPL CREATININE-BSD FRML MDRD: 30 ML/MIN/1.73SQ M
GLUCOSE SERPL-MCNC: 150 MG/DL (ref 65–140)
GLUCOSE SERPL-MCNC: 154 MG/DL (ref 65–140)
GLUCOSE UR STRIP-MCNC: NEGATIVE MG/DL
HAV IGM SER QL: NORMAL
HBV CORE IGM SER QL: NORMAL
HBV SURFACE AG SER QL: NORMAL
HCO3 BLDV-SCNC: 25.4 MMOL/L (ref 24–30)
HCT VFR BLD AUTO: 35.8 % (ref 34.8–46.1)
HCV AB SER QL: NORMAL
HGB BLD-MCNC: 11.5 G/DL (ref 11.5–15.4)
HGB UR QL STRIP.AUTO: ABNORMAL
IGG SERPL-MCNC: 954 MG/DL (ref 700–1600)
IMM GRANULOCYTES # BLD AUTO: 0.2 THOUSAND/UL (ref 0–0.2)
IMM GRANULOCYTES NFR BLD AUTO: 1 % (ref 0–2)
INR PPP: 1.33 (ref 0.84–1.19)
INR PPP: 1.46 (ref 0.84–1.19)
INR PPP: 1.61 (ref 0.84–1.19)
IRON SATN MFR SERPL: 71 %
IRON SERPL-MCNC: 150 UG/DL (ref 50–170)
KETONES UR STRIP-MCNC: ABNORMAL MG/DL
LEUKOCYTE ESTERASE UR QL STRIP: NEGATIVE
LYMPHOCYTES # BLD AUTO: 1.6 THOUSANDS/ΜL (ref 0.6–4.47)
LYMPHOCYTES NFR BLD AUTO: 7 % (ref 14–44)
MAGNESIUM SERPL-MCNC: 2.1 MG/DL (ref 1.6–2.6)
MCH RBC QN AUTO: 31.7 PG (ref 26.8–34.3)
MCHC RBC AUTO-ENTMCNC: 32.1 G/DL (ref 31.4–37.4)
MCV RBC AUTO: 99 FL (ref 82–98)
MONOCYTES # BLD AUTO: 1.77 THOUSAND/ΜL (ref 0.17–1.22)
MONOCYTES NFR BLD AUTO: 8 % (ref 4–12)
NEUTROPHILS # BLD AUTO: 20.01 THOUSANDS/ΜL (ref 1.85–7.62)
NEUTS SEG NFR BLD AUTO: 84 % (ref 43–75)
NITRITE UR QL STRIP: NEGATIVE
NON-SQ EPI CELLS URNS QL MICRO: NORMAL /HPF
NRBC BLD AUTO-RTO: 0 /100 WBCS
O2 CT BLDV-SCNC: 18.1 ML/DL
PCO2 BLDV: 36 MM HG (ref 42–50)
PH BLDV: 7.47 [PH] (ref 7.3–7.4)
PH UR STRIP.AUTO: 5 [PH]
PHOSPHATE SERPL-MCNC: 2.9 MG/DL (ref 2.3–4.1)
PLATELET # BLD AUTO: 252 THOUSANDS/UL (ref 149–390)
PLATELET # BLD AUTO: 257 THOUSANDS/UL (ref 149–390)
PMV BLD AUTO: 12.5 FL (ref 8.9–12.7)
PMV BLD AUTO: 12.7 FL (ref 8.9–12.7)
PO2 BLDV: 197.5 MM HG (ref 35–45)
POTASSIUM SERPL-SCNC: 2.8 MMOL/L (ref 3.5–5.3)
POTASSIUM SERPL-SCNC: 3.4 MMOL/L (ref 3.5–5.3)
PROCALCITONIN SERPL-MCNC: 6.24 NG/ML
PROCALCITONIN SERPL-MCNC: 8.81 NG/ML
PROT SERPL-MCNC: 7.1 G/DL (ref 6.4–8.2)
PROT SERPL-MCNC: 7.3 G/DL (ref 6.4–8.2)
PROT UR STRIP-MCNC: ABNORMAL MG/DL
PROTHROMBIN TIME: 16.4 SECONDS (ref 11.6–14.5)
PROTHROMBIN TIME: 17.7 SECONDS (ref 11.6–14.5)
PROTHROMBIN TIME: 19.1 SECONDS (ref 11.6–14.5)
RBC # BLD AUTO: 3.63 MILLION/UL (ref 3.81–5.12)
RBC #/AREA URNS AUTO: NORMAL /HPF
SODIUM SERPL-SCNC: 141 MMOL/L (ref 136–145)
SODIUM SERPL-SCNC: 141 MMOL/L (ref 136–145)
SP GR UR STRIP.AUTO: 1.01 (ref 1–1.03)
TIBC SERPL-MCNC: 211 UG/DL (ref 250–450)
TROPONIN I SERPL-MCNC: 8.01 NG/ML
TROPONIN I SERPL-MCNC: 8.02 NG/ML
TSH SERPL DL<=0.05 MIU/L-ACNC: 1.19 UIU/ML (ref 0.36–3.74)
TSH SERPL DL<=0.05 MIU/L-ACNC: 1.39 UIU/ML (ref 0.36–3.74)
UROBILINOGEN UR QL STRIP.AUTO: 0.2 E.U./DL
WBC # BLD AUTO: 23.63 THOUSAND/UL (ref 4.31–10.16)
WBC #/AREA URNS AUTO: NORMAL /HPF

## 2020-06-26 PROCEDURE — 87040 BLOOD CULTURE FOR BACTERIA: CPT | Performed by: NURSE PRACTITIONER

## 2020-06-26 PROCEDURE — 84443 ASSAY THYROID STIM HORMONE: CPT | Performed by: INTERNAL MEDICINE

## 2020-06-26 PROCEDURE — 99223 1ST HOSP IP/OBS HIGH 75: CPT | Performed by: INTERNAL MEDICINE

## 2020-06-26 PROCEDURE — 85610 PROTHROMBIN TIME: CPT | Performed by: INTERNAL MEDICINE

## 2020-06-26 PROCEDURE — 84145 PROCALCITONIN (PCT): CPT | Performed by: NURSE PRACTITIONER

## 2020-06-26 PROCEDURE — 86235 NUCLEAR ANTIGEN ANTIBODY: CPT | Performed by: INTERNAL MEDICINE

## 2020-06-26 PROCEDURE — 85730 THROMBOPLASTIN TIME PARTIAL: CPT | Performed by: INTERNAL MEDICINE

## 2020-06-26 PROCEDURE — 85049 AUTOMATED PLATELET COUNT: CPT | Performed by: INTERNAL MEDICINE

## 2020-06-26 PROCEDURE — 83550 IRON BINDING TEST: CPT | Performed by: INTERNAL MEDICINE

## 2020-06-26 PROCEDURE — 99222 1ST HOSP IP/OBS MODERATE 55: CPT | Performed by: INTERNAL MEDICINE

## 2020-06-26 PROCEDURE — 80074 ACUTE HEPATITIS PANEL: CPT | Performed by: INTERNAL MEDICINE

## 2020-06-26 PROCEDURE — 84100 ASSAY OF PHOSPHORUS: CPT | Performed by: INTERNAL MEDICINE

## 2020-06-26 PROCEDURE — 86645 CMV ANTIBODY IGM: CPT | Performed by: INTERNAL MEDICINE

## 2020-06-26 PROCEDURE — 94760 N-INVAS EAR/PLS OXIMETRY 1: CPT

## 2020-06-26 PROCEDURE — 82553 CREATINE MB FRACTION: CPT | Performed by: INTERNAL MEDICINE

## 2020-06-26 PROCEDURE — 73630 X-RAY EXAM OF FOOT: CPT

## 2020-06-26 PROCEDURE — 93306 TTE W/DOPPLER COMPLETE: CPT | Performed by: INTERNAL MEDICINE

## 2020-06-26 PROCEDURE — NC001 PR NO CHARGE: Performed by: EMERGENCY MEDICINE

## 2020-06-26 PROCEDURE — 86665 EPSTEIN-BARR CAPSID VCA: CPT | Performed by: INTERNAL MEDICINE

## 2020-06-26 PROCEDURE — 83735 ASSAY OF MAGNESIUM: CPT | Performed by: INTERNAL MEDICINE

## 2020-06-26 PROCEDURE — C8929 TTE W OR WO FOL WCON,DOPPLER: HCPCS

## 2020-06-26 PROCEDURE — 83540 ASSAY OF IRON: CPT | Performed by: INTERNAL MEDICINE

## 2020-06-26 PROCEDURE — 86663 EPSTEIN-BARR ANTIBODY: CPT | Performed by: INTERNAL MEDICINE

## 2020-06-26 PROCEDURE — 82805 BLOOD GASES W/O2 SATURATION: CPT | Performed by: NURSE PRACTITIONER

## 2020-06-26 PROCEDURE — 80053 COMPREHEN METABOLIC PANEL: CPT | Performed by: NURSE PRACTITIONER

## 2020-06-26 PROCEDURE — 82390 ASSAY OF CERULOPLASMIN: CPT | Performed by: INTERNAL MEDICINE

## 2020-06-26 PROCEDURE — 94640 AIRWAY INHALATION TREATMENT: CPT

## 2020-06-26 PROCEDURE — 82550 ASSAY OF CK (CPK): CPT | Performed by: INTERNAL MEDICINE

## 2020-06-26 PROCEDURE — 84484 ASSAY OF TROPONIN QUANT: CPT | Performed by: INTERNAL MEDICINE

## 2020-06-26 PROCEDURE — 99233 SBSQ HOSP IP/OBS HIGH 50: CPT | Performed by: NURSE PRACTITIONER

## 2020-06-26 PROCEDURE — 85025 COMPLETE CBC W/AUTO DIFF WBC: CPT | Performed by: INTERNAL MEDICINE

## 2020-06-26 PROCEDURE — 81001 URINALYSIS AUTO W/SCOPE: CPT | Performed by: INTERNAL MEDICINE

## 2020-06-26 PROCEDURE — 86644 CMV ANTIBODY: CPT | Performed by: INTERNAL MEDICINE

## 2020-06-26 PROCEDURE — 86038 ANTINUCLEAR ANTIBODIES: CPT | Performed by: INTERNAL MEDICINE

## 2020-06-26 PROCEDURE — 86664 EPSTEIN-BARR NUCLEAR ANTIGEN: CPT | Performed by: INTERNAL MEDICINE

## 2020-06-26 PROCEDURE — NC001 PR NO CHARGE: Performed by: PHYSICIAN ASSISTANT

## 2020-06-26 PROCEDURE — 82728 ASSAY OF FERRITIN: CPT | Performed by: INTERNAL MEDICINE

## 2020-06-26 PROCEDURE — 81256 HFE GENE: CPT | Performed by: INTERNAL MEDICINE

## 2020-06-26 PROCEDURE — 85610 PROTHROMBIN TIME: CPT | Performed by: NURSE PRACTITIONER

## 2020-06-26 PROCEDURE — 97163 PT EVAL HIGH COMPLEX 45 MIN: CPT

## 2020-06-26 PROCEDURE — 86376 MICROSOMAL ANTIBODY EACH: CPT | Performed by: INTERNAL MEDICINE

## 2020-06-26 PROCEDURE — 82784 ASSAY IGA/IGD/IGG/IGM EACH: CPT | Performed by: INTERNAL MEDICINE

## 2020-06-26 PROCEDURE — 80053 COMPREHEN METABOLIC PANEL: CPT | Performed by: INTERNAL MEDICINE

## 2020-06-26 RX ORDER — ALBUTEROL SULFATE 2.5 MG/3ML
2.5 SOLUTION RESPIRATORY (INHALATION) EVERY 4 HOURS PRN
Status: DISCONTINUED | OUTPATIENT
Start: 2020-06-26 | End: 2020-06-30

## 2020-06-26 RX ORDER — POTASSIUM CHLORIDE 20MEQ/15ML
40 LIQUID (ML) ORAL 3 TIMES DAILY
Status: DISCONTINUED | OUTPATIENT
Start: 2020-06-26 | End: 2020-06-26

## 2020-06-26 RX ORDER — POTASSIUM CHLORIDE 14.9 MG/ML
20 INJECTION INTRAVENOUS ONCE
Status: COMPLETED | OUTPATIENT
Start: 2020-06-26 | End: 2020-06-26

## 2020-06-26 RX ORDER — ALBUTEROL SULFATE 2.5 MG/3ML
SOLUTION RESPIRATORY (INHALATION)
Status: COMPLETED
Start: 2020-06-26 | End: 2020-06-26

## 2020-06-26 RX ORDER — FUROSEMIDE 10 MG/ML
40 INJECTION INTRAMUSCULAR; INTRAVENOUS
Status: DISCONTINUED | OUTPATIENT
Start: 2020-06-26 | End: 2020-06-27

## 2020-06-26 RX ORDER — POTASSIUM CHLORIDE 20 MEQ/1
40 TABLET, EXTENDED RELEASE ORAL ONCE
Status: DISCONTINUED | OUTPATIENT
Start: 2020-06-26 | End: 2020-06-26

## 2020-06-26 RX ORDER — POTASSIUM CHLORIDE 14.9 MG/ML
20 INJECTION INTRAVENOUS EVERY 6 HOURS
Status: COMPLETED | OUTPATIENT
Start: 2020-06-26 | End: 2020-06-27

## 2020-06-26 RX ADMIN — ACETYLCYSTEINE 3920 MG: 200 INJECTION, SOLUTION INTRAVENOUS at 00:46

## 2020-06-26 RX ADMIN — POTASSIUM CHLORIDE 20 MEQ: 14.9 INJECTION, SOLUTION INTRAVENOUS at 21:26

## 2020-06-26 RX ADMIN — FUROSEMIDE 40 MG: 10 INJECTION, SOLUTION INTRAMUSCULAR; INTRAVENOUS at 16:47

## 2020-06-26 RX ADMIN — ACETYLCYSTEINE 7840 MG: 200 INJECTION, SOLUTION INTRAVENOUS at 06:39

## 2020-06-26 RX ADMIN — HEPARIN SODIUM 5000 UNITS: 5000 INJECTION INTRAVENOUS; SUBCUTANEOUS at 06:37

## 2020-06-26 RX ADMIN — MELATONIN 3 MG: at 00:32

## 2020-06-26 RX ADMIN — METOPROLOL TARTRATE 100 MG: 50 TABLET, FILM COATED ORAL at 00:00

## 2020-06-26 RX ADMIN — ALBUTEROL SULFATE 2.5 MG: 2.5 SOLUTION RESPIRATORY (INHALATION) at 18:26

## 2020-06-26 RX ADMIN — MELATONIN 3 MG: at 21:28

## 2020-06-26 RX ADMIN — AMLODIPINE BESYLATE 5 MG: 5 TABLET ORAL at 08:15

## 2020-06-26 RX ADMIN — HEPARIN SODIUM 5000 UNITS: 5000 INJECTION INTRAVENOUS; SUBCUTANEOUS at 21:28

## 2020-06-26 RX ADMIN — METOPROLOL TARTRATE 100 MG: 50 TABLET, FILM COATED ORAL at 21:27

## 2020-06-26 RX ADMIN — HEPARIN SODIUM 5000 UNITS: 5000 INJECTION INTRAVENOUS; SUBCUTANEOUS at 00:32

## 2020-06-26 RX ADMIN — PERFLUTREN 0.6 ML/MIN: 6.52 INJECTION, SUSPENSION INTRAVENOUS at 11:51

## 2020-06-26 RX ADMIN — FUROSEMIDE 40 MG: 10 INJECTION, SOLUTION INTRAMUSCULAR; INTRAVENOUS at 00:33

## 2020-06-26 RX ADMIN — FUROSEMIDE 40 MG: 10 INJECTION, SOLUTION INTRAMUSCULAR; INTRAVENOUS at 09:00

## 2020-06-26 RX ADMIN — PHYTONADIONE 5 MG: 10 INJECTION, EMULSION INTRAMUSCULAR; INTRAVENOUS; SUBCUTANEOUS at 12:57

## 2020-06-26 RX ADMIN — METOPROLOL TARTRATE 100 MG: 50 TABLET, FILM COATED ORAL at 08:15

## 2020-06-26 RX ADMIN — POTASSIUM CHLORIDE 20 MEQ: 14.9 INJECTION, SOLUTION INTRAVENOUS at 13:54

## 2020-06-26 RX ADMIN — POTASSIUM CHLORIDE 20 MEQ: 14.9 INJECTION, SOLUTION INTRAVENOUS at 09:00

## 2020-06-26 RX ADMIN — HEPARIN SODIUM 5000 UNITS: 5000 INJECTION INTRAVENOUS; SUBCUTANEOUS at 13:10

## 2020-06-26 RX ADMIN — LEVOTHYROXINE SODIUM 125 MCG: 125 TABLET ORAL at 06:37

## 2020-06-26 NOTE — ASSESSMENT & PLAN NOTE
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

## 2020-06-26 NOTE — SOCIAL WORK
CM called and spoke to pt's daughter Marylou Sood to discuss the role of CM and to discuss any help pt may need prior to dc  Pt lives with her niece Abi Bowers  Pt performed ADL's indptly pta, no use of DME  No hx of HHC or rehab  Pt's preferred pharmacy is [facility] in [place]  Pt has a hx of mental health IP treatment 15 years ago; pt does not see a psychiatrist  No hx of D&A treatment  CM discussed therapies recommendation for rehab  Marylou Sood is agreeable  A post acute care recommendation was made by your care team for [facility]  Discussed Freedom of Choice with daughter  List of facilities given to daughter via emailed  daughter aware the list is custom filtered for them by preference  and that [facility] post acute providers are designated  SNF list e-mailed to zarina--email: [email or web address]    Contact: Marylou Sood (daughter) [phone]  No POA or living will

## 2020-06-26 NOTE — ASSESSMENT & PLAN NOTE
Secondary to hypercapnia due to decreased sensorium  Currently patient is much more awake and feels comfortable  She is also communicative  Will discontinue BiPAP in favor of nasal cannula oxygen

## 2020-06-26 NOTE — ASSESSMENT & PLAN NOTE
· Nephrology consult  · Will also place patient on isolate hydration  · Recheck metabolic profile renal numbers tomorrow

## 2020-06-26 NOTE — PROGRESS NOTES
Progress Note - Naresh Albert 1945, 66 y o  female MRN: [id number]    Unit/Bed#: [facility] 724-01 Encounter: [id number]    Primary Care Provider: No primary care provider on file  Date and time admitted to hospital: 2020  3:20 PM        * Acute hepatic encephalopathy  Assessment & Plan  · Patient was found unresponsive most likely secondary to hepatic encephalopathy in relation to chronic ingestion of Tylenol p m  with resultant elevation of LFTs currently in the 3000  At this point, will place patient on acetylcysteine despite acetaminophen level is less than 2     · Toxicology consult  · Per -  NAC  at 11 pm   IF ALT and INR conitnue to trend down from 8p recheck, let    to check levels since he is on until 9 pm   · GI consult    Transaminitis  Assessment & Plan  · Most likely as a result of chronic Tylenol toxicity as patient likes taking Tylenol p m  without telling others  She is also on some medications which are metabolized hepatically  Will discontinue those medications  Hold off on any Tylenol  · GI consult  · Toxicology consult   · Repeat cmp and pt inr- if increased reorder nac if decreasing let It  which is scheduled for 11 p to   · Am labs     Elevated troponin  Assessment & Plan  · Elevated troponin without history of chest pain  Patient currently appears very comfortable  Second troponin from 11 is 10 2  Will continue to watch  Likely related to tylenol toxicity   · Cardiology consult  · ECHO-  EF 35%       Acute congestive heart failure (HCC)  Assessment & Plan  Wt Readings from Last 3 Encounters:   20 78 8 kg (173 lb 11 6 oz)       · EF 35%, protocol for NAC finishes at 11p  · Patient with some wheezes  · IV Lasix 40 BID, if stable tomorrow consider lowering dose as fluids should be done running     PHILIPPE (acute kidney injury) [facility])  Assessment & Plan  · Nephrology consult      · Will also place patient on isolate hydration  · Recheck metabolic profile renal numbers tomorrow  Elevated INR  Assessment & Plan  · Elevated INR 1 53 on admission and 1 46 today,  most likely secondary to coagulopathy due to current elevated transaminases/hepatic failure  Localized swelling on right hand  Assessment & Plan  · Doppler ordered    Leukocytosis  Assessment & Plan  · 23 63 likely stress reaction   · Continue to trend     Acute respiratory failure with hypoxia and hypercapnia (HCC)  Assessment & Plan  · Secondary to hypercapnia due to decreased sensorium  Was on bipap which has been discontinued    Poor social situation  Assessment & Plan  · Reportedly approximately more than 3 weeks ago, she was in [facility] for a total of 3 weeks likewise due to inability to care for self  According to the niece who is in the room, her house is unkept and the utilities are turned off  It seems also that there may be some psychiatric concerns that prompted her admission  She was released approximately 3 weeks ago as mentioned, stayed the first 2 days in a hotel due to the fact that her house is not functional   After that, she has stayed with her niece until today  · Niece states they have area on aging involved to assist with social support       VTE Pharmacologic Prophylaxis:   Pharmacologic: Heparin  Mechanical VTE Prophylaxis in Place: Yes    Patient Centered Rounds: I have performed bedside rounds with nursing staff today  Discussions with Specialists or Other Care Team Provider: toxicology    Education and Discussions with Family / Patient: patient and niece    Time Spent for Care: 20 minutes  More than 50% of total time spent on counseling and coordination of care as described above      Current Length of Stay: 1 day(s)    Current Patient Status: Inpatient   Certification Statement: The patient will continue to require additional inpatient hospital stay due to close lab monitoring and respiratory status monitoring Discharge Plan: pending improvement    Code Status: Level 1 - Full Code      Subjective:   Patient states she is feeling much better  Objective:     Vitals:   Temp (24hrs), Av 4 °F (36 3 °C), Min:96 8 °F (36 °C), Max:97 6 °F (36 4 °C)    Temp:  [96 8 °F (36 °C)-97 6 °F (36 4 °C)] 97 5 °F (36 4 °C)  HR:  [74-97] 79  Resp:  [15-20] 20  BP: (115-169)/(70-89) 115/71  SpO2:  [86 %-99 %] 93 %  Body mass index is 33 93 kg/m²  Input and Output Summary (last 24 hours): Intake/Output Summary (Last 24 hours) at 2020 1703  Last data filed at 2020 1646  Gross per 24 hour   Intake 2293 83 ml   Output 3200 ml   Net -906 17 ml       Physical Exam:     Physical Exam   Constitutional: She is oriented to person, place, and time  She appears well-developed  She appears distressed  HENT:   Head: Normocephalic and atraumatic  Neck: Normal range of motion  Neck supple  Cardiovascular: Normal rate and regular rhythm  No murmur heard  Pulmonary/Chest: Effort normal and breath sounds normal  No respiratory distress  Abdominal: Soft  Bowel sounds are normal    Musculoskeletal: Normal range of motion  She exhibits edema (right hand )  Neurological: She is alert and oriented to person, place, and time  Skin: Skin is warm and dry  Psychiatric: She has a normal mood and affect   Her behavior is normal  Judgment and thought content normal          Additional Data:     Labs:    Results from last 7 days   Lab Units 20  0528   WBC Thousand/uL 23 63*   HEMOGLOBIN g/dL 11 5   HEMATOCRIT % 35 8   PLATELETS Thousands/uL 257   NEUTROS PCT % 84*   LYMPHS PCT % 7*   MONOS PCT % 8   EOS PCT % 0     Results from last 7 days   Lab Units 20  0528   SODIUM mmol/L 141   POTASSIUM mmol/L 2 8*   CHLORIDE mmol/L 104   CO2 mmol/L 25   BUN mg/dL 52*   CREATININE mg/dL 1 91*   ANION GAP mmol/L 12   CALCIUM mg/dL 7 8*   ALBUMIN g/dL 2 8*   TOTAL BILIRUBIN mg/dL 0 76   ALK PHOS U/L 89   ALT U/L 1,473*   AST U/L 1,293*   GLUCOSE RANDOM mg/dL 150*     Results from last 7 days   Lab Units 06/26/20  1113   INR  1 46*     Results from last 7 days   Lab Units 06/25/20  1531   POC GLUCOSE mg/dl 160*         Results from last 7 days   Lab Units 06/26/20  0903 06/25/20  1844 06/25/20  1649   LACTIC ACID mmol/L  --  1 9 3 8*   PROCALCITONIN ng/ml 8 81*  --   --            * I Have Reviewed All Lab Data Listed Above  * Additional Pertinent Lab Tests Reviewed: Juwan 64 Admission Reviewed    Imaging:    Imaging Reports Reviewed Today Include: echo  Imaging Personally Reviewed by Myself Includes:  none    Recent Cultures (last 7 days):     Results from last 7 days   Lab Units 06/26/20  1121 06/26/20  1112   BLOOD CULTURE  Received in Microbiology Lab  Culture in Progress  Received in Microbiology Lab  Culture in Progress  Last 24 Hours Medication List:     Current Facility-Administered Medications:  acetylcysteine 100 mg/kg Intravenous Once Eduin White MD Last Rate: 7,840 mg (06/26/20 3647)   aluminum-magnesium hydroxide-simethicone 5 mL Oral Q6H PRN Eduin White MD    amLODIPine 5 mg Oral Daily Eduin White MD    furosemide 40 mg Intravenous BID (diuretic) Nilo Farrell MD    heparin (porcine) 5,000 Units Subcutaneous [facility] Eduin White MD    levothyroxine 125 mcg Oral Early Morning Eduin White MD    melatonin 3 mg Oral HS Eduin White MD    metoprolol tartrate 100 mg Oral Q12H [facility] Eduin White MD    ondansetron 4 mg Intravenous Q6H PRN Eduin White MD    phytonadione 5 mg Intravenous Daily Ana Lema MD Last Rate: Stopped (06/26/20 1352)   potassium chloride 20 mEq Intravenous Q6H Nilo Farrell MD Last Rate: Stopped (06/26/20 1646)        Today, Patient Was Seen By: YG Colon    ** Please Note: Dictation voice to text software may have been used in the creation of this document   **

## 2020-06-26 NOTE — PLAN OF CARE
Problem: PHYSICAL THERAPY ADULT  Goal: Performs mobility at highest level of function for planned discharge setting  See evaluation for individualized goals  Description  Treatment/Interventions: Functional transfer training, LE strengthening/ROM, Therapeutic exercise, Endurance training, Cognitive reorientation, Patient/family training, Equipment eval/education, Bed mobility, Gait training, Spoke to nursing  Equipment Recommended: Alina Robbins       See flowsheet documentation for full assessment, interventions and recommendations  Outcome: Progressing  Note:   Prognosis: Fair  Problem List: Decreased strength, Decreased range of motion, Decreased endurance, Impaired balance, Decreased mobility, Decreased coordination, Decreased cognition, Impaired judgement, Decreased safety awareness  Assessment: Pt is a 65 yo F who presents with acute hepatic encephalopathy following chronic tylenol use  order placed for PT eval and tx, w/ activity order of up w/ A  pt presents w/ comorbidities of PHILIPPE, elevated troponin, leukocytosis, acute respiratory failure with hypoxia and hypercapnia, acute congestive heart failure and personal factors of living in 2 story house, stair(s) to enter home, communication issues, inability to ambulate household distances, inability to navigate community distances, inability to navigate level surfaces w/o external assistance, unable to perform dynamic tasks in community, decreased cognition, compliance, unable to perform physical activity, limited insight into impairments, inability to perform IADLs, inability to perform ADLs and inability to live alone  pt presents w/ pain, weakness, decreased ROM, decreased endurance, impaired cognition, impaired balance, decreased safety awareness, impaired judgment, fall risk and LE edema   these impairments are evident in findings from physical examination (weakness, decreased ROM, impaired coordination and edema of extremities), mobility assessment (need for Mod assist w/ all phases of mobility when usually mobilizing independently, and need for cueing for mobility technique), and Barthel Index: 10/100  pt needed input for task focus and mobility technique  pt is at risk for falls due to physical and safety awareness deficits  pt's clinical presentation is unstable/unpredictable (evident in poor blood pressure control, need for assist w/ all phases of mobility when usually mobilizing independently, need for supplemental oxygen in order to maintain oxygen saturation, need for input for mobility technique, need for input for task focus and mobility technique and need for input for mobility technique/safety)  pt needs inpatient PT tx to improve mobility deficits  discharge recommendation is for inpatient rehab to reduce fall risk and maximize level of functional independence  PT to see for continued evaluation of gait  PT Discharge Recommendation: Post-Acute Rehabilitation Services     PT - OK to Discharge: Yes    See flowsheet documentation for full assessment

## 2020-06-26 NOTE — ASSESSMENT & PLAN NOTE
- Continue to monitor  - initially elevated lactic, since cleared  - UA negative  - Trend WBC, monitor temperatures

## 2020-06-26 NOTE — SOCIAL WORK
CM attempted to speak with patient's daughter for discharge planning  The number for patient's dgt is incorrect/disconneted  KENNY left voicemail for patient's niece  No family at the bedside at this time  CM will continue to follow

## 2020-06-26 NOTE — PLAN OF CARE
Problem: Potential for Falls  Goal: Patient will remain free of falls  Description  INTERVENTIONS:  - Assess patient frequently for physical needs  -  Identify cognitive and physical deficits and behaviors that affect risk of falls    -  [place] fall precautions as indicated by assessment   - Educate patient/family on patient safety including physical limitations  - Instruct patient to call for assistance with activity based on assessment  - Modify environment to reduce risk of injury  - Consider OT/PT consult to assist with strengthening/mobility  Outcome: Progressing     Problem: Prexisting or High Potential for Compromised Skin Integrity  Goal: Skin integrity is maintained or improved  Description  INTERVENTIONS:  - Identify patients at risk for skin breakdown  - Assess and monitor skin integrity  - Assess and monitor nutrition and hydration status  - Monitor labs   - Assess for incontinence   - Turn and reposition patient  - Assist with mobility/ambulation  - Relieve pressure over bony prominences  - Avoid friction and shearing  - Provide appropriate hygiene as needed including keeping skin clean and dry  - Evaluate need for skin moisturizer/barrier cream  - Collaborate with interdisciplinary team   - Patient/family teaching  - Consider wound care consult   Outcome: Progressing     Problem: SAFETY ADULT  Goal: Maintain or return to baseline ADL function  Description  INTERVENTIONS:  -  Assess patient's ability to carry out ADLs; assess patient's baseline for ADL function and identify physical deficits which impact ability to perform ADLs (bathing, care of mouth/teeth, toileting, grooming, dressing, etc )  - Assess/evaluate cause of self-care deficits   - Assess range of motion  - Assess patient's mobility; develop plan if impaired  - Assess patient's need for assistive devices and provide as appropriate  - Encourage maximum independence but intervene and supervise when necessary  - Involve family in performance of ADLs  - Assess for home care needs following discharge   - Consider OT consult to assist with ADL evaluation and planning for discharge  - Provide patient education as appropriate  Outcome: Progressing  Goal: Maintain or return mobility status to optimal level  Description  INTERVENTIONS:  - Assess patient's baseline mobility status (ambulation, transfers, stairs, etc )    - Identify cognitive and physical deficits and behaviors that affect mobility  - Identify mobility aids required to assist with transfers and/or ambulation (gait belt, sit-to-stand, lift, walker, cane, etc )  - [place] fall precautions as indicated by assessment  - Record patient progress and toleration of activity level on Mobility SBAR; progress patient to next Phase/Stage  - Instruct patient to call for assistance with activity based on assessment  - Consider rehabilitation consult to assist with strengthening/weightbearing, etc   Outcome: Progressing     Problem: DISCHARGE PLANNING  Goal: Discharge to home or other facility with appropriate resources  Description  INTERVENTIONS:  - Identify barriers to discharge w/patient and caregiver  - Arrange for needed discharge resources and transportation as appropriate  - Identify discharge learning needs (meds, wound care, etc )  - Arrange for interpretive services to assist at discharge as needed  - Refer to Case Management Department for coordinating discharge planning if the patient needs post-hospital services based on physician/advanced practitioner order or complex needs related to functional status, cognitive ability, or social support system  Outcome: Progressing     Problem: CARDIOVASCULAR - ADULT  Goal: Maintains optimal cardiac output and hemodynamic stability  Description  INTERVENTIONS:  - Monitor I/O, vital signs and rhythm  - Monitor for S/S and trends of decreased cardiac output  - Administer and titrate ordered vasoactive medications to optimize hemodynamic stability  - Assess quality of pulses, skin color and temperature  - Assess for signs of decreased coronary artery perfusion  - Instruct patient to report change in severity of symptoms  Outcome: Progressing     Problem: METABOLIC, FLUID AND ELECTROLYTES - ADULT  Goal: Electrolytes maintained within normal limits  Description  INTERVENTIONS:  - Monitor labs and assess patient for signs and symptoms of electrolyte imbalances  - Administer electrolyte replacement as ordered  - Monitor response to electrolyte replacements, including repeat lab results as appropriate  - Instruct patient on fluid and nutrition as appropriate  Outcome: Progressing     Problem: SKIN/TISSUE INTEGRITY - ADULT  Goal: Skin integrity remains intact  Description  INTERVENTIONS  - Identify patients at risk for skin breakdown  - Assess and monitor skin integrity  - Assess and monitor nutrition and hydration status  - Monitor labs (i e  albumin)  - Assess for incontinence   - Turn and reposition patient  - Assist with mobility/ambulation  - Relieve pressure over bony prominences  - Avoid friction and shearing  - Provide appropriate hygiene as needed including keeping skin clean and dry  - Evaluate need for skin moisturizer/barrier cream  - Collaborate with interdisciplinary team (i e  Nutrition, Rehabilitation, etc )   - Patient/family teaching  Outcome: Progressing     Problem: MUSCULOSKELETAL - ADULT  Goal: Maintain or return mobility to safest level of function  Description  INTERVENTIONS:  - Assess patient's ability to carry out ADLs; assess patient's baseline for ADL function and identify physical deficits which impact ability to perform ADLs (bathing, care of mouth/teeth, toileting, grooming, dressing, etc )  - Assess/evaluate cause of self-care deficits   - Assess range of motion  - Assess patient's mobility  - Assess patient's need for assistive devices and provide as appropriate  - Encourage maximum independence but intervene and supervise when necessary  - Involve family in performance of ADLs  - Assess for home care needs following discharge   - Consider OT consult to assist with ADL evaluation and planning for discharge  - Provide patient education as appropriate  Outcome: Progressing  Goal: Maintain proper alignment of affected body part  Description  INTERVENTIONS:  - Support, maintain and protect limb and body alignment  - Provide patient/ family with appropriate education  Outcome: Progressing

## 2020-06-26 NOTE — ASSESSMENT & PLAN NOTE
Nephrology consult  Will also place patient on isolate hydration  Recheck metabolic profile renal numbers tomorrow

## 2020-06-26 NOTE — SOCIAL WORK
Patient here with acute hepatic encephalopathy  Patient has consults to cardiology, nephrology and gastroenterology  Patient not clear to discharge today  PT evaluation pending  Patient will also need an OT order  CM will continue to follow

## 2020-06-26 NOTE — CONSULTS
INTERPROFESSIONAL (PHONE) [facility]  Faisal Rocha 66 y o  female MRN: [id number]  Unit/Bed#: [facility] 724-01 Encounter: [id number]      Reason for Consult / Principal Problem: acute liver injury   Inpatient consult to Toxicology  Consult performed by: Clyda Bamberger, DO  Consult ordered by: Liza Tipton MD        06/26/20      ASSESSMENT:  66year-old female with acute hepatotoxicity associated with acetaminophen  1  Acute hepatotoxicity associated with acetaminophen exposure   2  Acute renal insufficiency associated with acetaminophen exposure    RECOMMENDATIONS:  Presentation is concerning for acetaminophen toxicity in the setting of delayed acute overdose versus repeat supratherapeutic ingestion  This would be the most likely drug-induced liver injury  In both of these settings, you can observe hepatotoxicity, acute renal injury, elevated troponin, elevated INR, hypokalemia and undetectable acetaminophen concentration  Please continue NAC for full treatment course and obtain a CMP and INR at 8pm to assure ALT continues to downtrend and INR remains less than 2  Given the current pattern of transaminitis, I suspect the NAC may be discontinued at 11pm as scheduled; however, if ALT rising or INR > 2, please continue NAC at 6 25 mg/kg/hr for additional 16 hours  Please repeat a venous blood gas now to assure patient is not meeting [employer] as her pH was 7 27 last night  Given that the patient's presentation is also multifactorial, with the possibility of her pulmonary infiltrates and other processes being parallel or consequential processes (ie aspiration while encephalopathic), please continue further medical management and supportive care  If overdose has occurred, her CNS depression and hypoxia would have more likely been the result of a coingestion or additional process such as CHF or pneumonia       For further questions, please contact the medical  on call via [employer] or throughl the [employer] or [employer] Forms  Please see additional teaching note below:    Hx and PE limited by the dynamics of a phone consultation  I have not personally interviewed or evaluated the patient, but only advised based on the information provided to me  Primary provider is responsible for all clinical decisions  Pertinent history, physical exam and clinical findings and course discussed: Colton Keita is a 66y o  year old female who presents with CNS depression, cyanosis, transaminitis, and history of depression, chronic pain, and frequent APAP use  She was started on NAC after admission and at that point we were consulted for additional evaluation/recommendations  Review of systems and physical exam not performed by me  Historical Information   Past Medical History:   Diagnosis Date    Disease of thyroid gland     Hyperlipemia     Hypertension      History reviewed  No pertinent surgical history  Social History   Social History     Substance and Sexual Activity   Alcohol Use Not Currently     Social History     Substance and Sexual Activity   Drug Use Never     Social History     Tobacco Use   Smoking Status Never Smoker   Smokeless Tobacco Never Used     History reviewed  No pertinent family history  Prior to Admission medications    Medication Sig Start Date End Date Taking?  Authorizing Provider   amLODIPine (NORVASC) 5 mg tablet Take 5 mg by mouth daily   Yes Historical Provider, MD   atorvastatin (LIPITOR) 10 mg tablet Take 10 mg by mouth daily   Yes Historical Provider, MD   ciprofloxacin (CIPRO) 750 mg tablet Take 750 mg by mouth every 24 hours   Yes Historical Provider, MD   levothyroxine 125 mcg tablet Take 125 mcg by mouth daily   Yes Historical Provider, MD   metoprolol tartrate (LOPRESSOR) 100 mg tablet Take 100 mg by mouth every 12 (twelve) hours   Yes Historical Provider, MD   traZODone (DESYREL) 100 mg tablet Take 200 mg by mouth daily at bedtime   Yes Historical Provider, MD       Current Facility-Administered Medications   Medication Dose Route Frequency    acetylcysteine (ACETADOTE) 7,840 mg in dextrose 5 % 1,000 mL IVPB  100 mg/kg Intravenous Once    aluminum-magnesium hydroxide-simethicone (MYLANTA) 200-200-20 mg/5 mL oral suspension 5 mL  5 mL Oral Q6H PRN    amLODIPine (NORVASC) tablet 5 mg  5 mg Oral Daily    furosemide (LASIX) injection 40 mg  40 mg Intravenous BID (diuretic)    heparin (porcine) subcutaneous injection 5,000 Units  5,000 Units Subcutaneous Q8H [address]    levothyroxine tablet 125 mcg  125 mcg Oral Early Morning    melatonin tablet 3 mg  3 mg Oral HS    metoprolol tartrate (LOPRESSOR) tablet 100 mg  100 mg Oral Q12H [address]    ondansetron (ZOFRAN) injection 4 mg  4 mg Intravenous Q6H PRN    potassium chloride 20 mEq IVPB (premix)  20 mEq Intravenous Once    potassium chloride 20 mEq IVPB (premix)  20 mEq Intravenous Q6H       Allergies   Allergen Reactions    Asa [Aspirin]     Penicillins        Objective       Intake/Output Summary (Last 24 hours) at 6/26/2020 1055  Last data filed at 6/26/2020 0801  Gross per 24 hour   Intake 1830 ml   Output 1900 ml   Net -70 ml       Invasive Devices:   Peripheral IV 06/25/20 Left Antecubital (Active)   Site Assessment Clean;Dry; Intact 6/26/2020 12:13 AM   Dressing Type Transparent 6/26/2020 12:13 AM   Line Status Flushed;Saline locked 6/26/2020 12:13 AM   Dressing Status Clean;Dry; Intact 6/26/2020 12:13 AM   Dressing Change Due 06/29/20 6/26/2020 12:13 AM       Peripheral IV 06/25/20 Right Forearm (Active)   Site Assessment Clean;Dry; Intact 6/26/2020 12:13 AM   Dressing Type Transparent 6/26/2020 12:13 AM   Line Status Flushed;Saline locked 6/26/2020 12:13 AM   Dressing Status Clean;Dry; Intact 6/26/2020 12:13 AM   Dressing Change Due 06/29/20 6/26/2020 12:13 AM       External Urinary Catheter (Active)   Collection Container Canister and suction tubing (For Female) 6/26/2020 7:29 AM   Suction Pressure (mmHg) 90 mmHg 6/26/2020  7:29 AM   Interventions Removed and skin assessed; Pericare performed;Device changed;Suction canister changed;Suction tubing changed 6/26/2020  7:29 AM   Output (mL) 350 mL 6/26/2020  7:29 AM       Vitals   Vitals:    06/26/20 0723 06/26/20 0727 06/26/20 0729 06/26/20 0743   BP: 147/70 139/78 142/79    TempSrc: Oral      Pulse: 75 89 87 81   Resp: 18 18 18    Patient Position - Orthostatic VS: Lying - Orthostatic VS Sitting - Orthostatic VS Standing - Orthostatic VS    Temp: 97 6 °F (36 4 °C)            EKG, Pathology, and/or Other Studies: normal per admitting physician/chart review      Lab Results: I have personally reviewed pertinent reports  Labs:    Results from last 7 days   Lab Units 06/26/20  0528   WBC Thousand/uL 23 63*   HEMOGLOBIN g/dL 11 5   HEMATOCRIT % 35 8   PLATELETS Thousands/uL 257   NEUTROS PCT % 84*   LYMPHS PCT % 7*   MONOS PCT % 8      Results from last 7 days   Lab Units 06/26/20  0528   SODIUM mmol/L 141   POTASSIUM mmol/L 2 8*   CHLORIDE mmol/L 104   CO2 mmol/L 25   BUN mg/dL 52*   CREATININE mg/dL 1 91*   CALCIUM mg/dL 7 8*   ALK PHOS U/L 89   ALT U/L 1,473*   AST U/L 1,293*   MAGNESIUM mg/dL 2 1   PHOSPHORUS mg/dL 2 9      Results for Cherri Alvarez (MRN [id number]) as of 6/26/2020 10:52   Ref   Range 6/25/2020 15:32 6/25/2020 22:33 6/26/2020 05:28   Sodium Latest Ref Range: 136 - 145 mmol/L 138  141   Potassium Latest Ref Range: 3 5 - 5 3 mmol/L 3 5  2 8 (L)   Chloride Latest Ref Range: 100 - 108 mmol/L 101  104   CO2 Latest Ref Range: 21 - 32 mmol/L 24  25   Anion Gap Latest Ref Range: 4 - 13 mmol/L 13  12   BUN Latest Ref Range: 5 - 25 mg/dL 51 (H)  52 (H)   Creatinine Latest Ref Range: 0 60 - 1 30 mg/dL 2 48 (H)  1 91 (H)   Glucose, Random Latest Ref Range: 65 - 140 mg/dL 163 (H)  150 (H)   Calcium Latest Ref Range: 8 3 - 10 1 mg/dL 8 1 (L)  7 8 (L)   AST Latest Ref Range: 5 - 45 U/L 3,174 (H)  1,293 (H)   ALT Latest Ref Range: 12 - 78 U/L 2,164 (H)  1,473 (H)   Alkaline Phosphatase Latest Ref Range: 46 - 116 U/L 111  89   Total Protein Latest Ref Range: 6 4 - 8 2 g/dL 8 2  7 1   Albumin Latest Ref Range: 3 5 - 5 0 g/dL 3 4 (L)  2 8 (L)   TOTAL BILIRUBIN Latest Ref Range: 0 20 - 1 00 mg/dL 0 69  0 76   eGFR Latest Units: ml/min/1 73sq m 18  25   Phosphorus Latest Ref Range: 2 3 - 4 1 mg/dL   2 9   Magnesium Latest Ref Range: 1 6 - 2 6 mg/dL   2 1   Ammonia Latest Ref Range: 11 - 35 umol/L  18          Results from last 7 days   Lab Units 06/26/20  0152 06/25/20  1532   INR  1 61* 1 53*   PTT seconds 40*  --      Results from last 7 days   Lab Units 06/25/20  1844 06/25/20  1649   LACTIC ACID mmol/L 1 9 3 8*     0   Lab Value Date/Time    TROPONINI 8 01 (H) 06/26/2020 0528    TROPONINI 8 02 (H) 06/26/2020 0152    TROPONINI 10 20 (H) 06/25/2020 1807    TROPONINI 11 00 (H) 06/25/2020 1532     Results from last 7 days   Lab Units 06/25/20  1532   PH LILLY  7 270*   PCO2 LILLY mm Hg 55 8*   PO2 LILLY mm Hg 49 8*   HCO3 LILLY mmol/L 25 0   O2 CONTENT LILLY ml/dL 14 0   O2 HGB, VENOUS % 73 1     Results from last 7 days   Lab Units 06/25/20  1649   ACETAMINOPHEN LVL ug/mL <2*   ETHANOL LVL mg/dL <3   SALICYLATE LVL mg/dL <3*             Counseling / Coordination of Care  Total time spent today 64 minutes  This was a phone consultation

## 2020-06-26 NOTE — ASSESSMENT & PLAN NOTE
Wt Readings from Last 3 Encounters:   06/25/20 78 3 kg (172 lb 9 9 oz)     - NT-BNP 82,271  - Aggressive diuresis  - Formal Echocardiogram  - Consider cardiology consult

## 2020-06-26 NOTE — RESPIRATORY THERAPY NOTE
RT Protocol Note  Anastasia Nate 66 y o  female MRN: [id number]  Unit/Bed#: [facility] 724-01 Encounter: [id number]    Assessment    Principal Problem:    Acute hepatic encephalopathy  Active Problems:    PHILIPPE (acute kidney injury) ([address] )    Elevated troponin    Transaminitis    Elevated INR    Leukocytosis    Acute respiratory failure with hypoxia and hypercapnia (HCC)    Acute congestive heart failure (HCC)    Poor social situation    Localized swelling on right hand      Home Pulmonary Medications:  none       Past Medical History:   Diagnosis Date    Disease of thyroid gland     Hyperlipemia     Hypertension      Social History     Socioeconomic History    Marital status:      Spouse name: None    Number of children: None    Years of education: None    Highest education level: None   Occupational History    None   Social Needs    Financial resource strain: None    Food insecurity:     Worry: None     Inability: None    Transportation needs:     Medical: None     Non-medical: None   Tobacco Use    Smoking status: Never Smoker    Smokeless tobacco: Never Used   Substance and Sexual Activity    Alcohol use: Not Currently    Drug use: Never    Sexual activity: None   Lifestyle    Physical activity:     Days per week: None     Minutes per session: None    Stress: None   Relationships    Social connections:     Talks on phone: None     Gets together: None     Attends Christian service: None     Active member of club or organization: None     Attends meetings of clubs or organizations: None     Relationship status: None    Intimate partner violence:     Fear of current or ex partner: None     Emotionally abused: None     Physically abused: None     Forced sexual activity: None   Other Topics Concern    None   Social History Narrative    None       Subjective         Objective    Physical Exam:   Assessment Type: Assess only  General Appearance: Awake, Drowsy  Respiratory Pattern: Tachypneic  Chest Assessment: Chest expansion symmetrical  Bilateral Breath Sounds: Diminished, Expiratory wheezes    Vitals:  Blood pressure 115/71, pulse 68, temperature 97 5 °F (36 4 °C), resp  rate 20, height 5' (1 524 m), weight 78 8 kg (173 lb 11 6 oz), SpO2 99 %  Imaging and other studies: I have personally reviewed pertinent reports  Plan    Respiratory Plan: Mild Distress pathway        Resp Comments: Pt assessed at this time per resp protocol  Pt admitted for acute hepatic encephalopathy  She has no pulmonary HX and takes no resp medications at home  Upon assessment pt had faint end exp wheezing  She was able to be aroused but apears very drowsy  WIll speak with RN  She was given an albuterol UDN with no change in BS post [address]  CXR was reviewed at this time  Prn udn will be ordered at this time  Pt will continue to be monitored per resp protocol

## 2020-06-26 NOTE — PROGRESS NOTES
Patient remains off BiPAP today with adequate oxygen saturations on 2-3 L nasal cannula  No need for continued critical care  Will sign off  Call with questions or concerns      Claudean Bares, PA-C

## 2020-06-26 NOTE — ED NOTES
Patient taken off bipap at this time by Dr Hemant Thapa and placed on 4L NC, stating at 91%     Eli Coleman RN  06/25/20 2128

## 2020-06-26 NOTE — ASSESSMENT & PLAN NOTE
- Check ammonia  - Regular neuro checks  - CT head negative  - No obvious focal deficits on exam  - Consider other sources of encephalopathy

## 2020-06-26 NOTE — ASSESSMENT & PLAN NOTE
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

## 2020-06-26 NOTE — ASSESSMENT & PLAN NOTE
Wt Readings from Last 3 Encounters:   06/26/20 78 8 kg (173 lb 11 6 oz)       · EF 35%, protocol for NAC finishes at 11p  · Patient with some wheezes  · IV Lasix 40 BID, if stable tomorrow consider lowering dose as fluids should be done running

## 2020-06-26 NOTE — CONSULTS
Consultation - Cardiology Team One  Lance Lozano 66 y o  female MRN: [id number]  Unit/Bed#: [facility] 724-01 Encounter: [id number]    Inpatient consult to Cardiology  Consult performed by: YG Vieira  Consult ordered by: Eduni White MD      Physician Requesting Consult: Deonna Fontana MD  Reason for Consult / Principal Problem: Elevated troponin    Assessment/ Plan    1  Acute metabolic encephalopathy- mentation improved from arrival but remains a limited historian  Able to tell me she is at [facility] and who the president is but unable to provide the month or year  When asked why she is here in the hospital she was unsure, "I guess I was weak"  2  Acute hypoxic and hypercapnic respiratory failure  Currently on 2LNC    3  Elevated troponin likely representing non MI troponin elevation in setting of renal insufficiency, hypoxia, and volume overload  Troponin trend 11, 10 20, 8 02, 8 01  ECG- NSR with nonspecific ST/T wave changes  Tele- NSR    4  Volume overload  Volume overloaded on exam  Agree with nephrology to d/c IVF and start IV diuretics  Albumin is low and if she does not have adequate response to IV Lasix could switch to IV Bumex  Trial Lasix first- 40 mg IV BID  Echocardiogram ordered  Strict I/Os, daily weights, close monitoring of renal function  5  PHILIPPE- Cr 2 48-->1 91 with IVF  Nephrology consulted, recommending d/c IVF and start diuretics  6  Transaminitis- AST 3174-->1293, ALT 2164-->1473  RUQ ultrasound ordered  Possibly due to acute CHF/volume overload- echocardiogram pending  Acetaminophen level <2     7  Leukocytosis- WBC 23 63  Monitoring off antibiotics for now, afebrile  Management per primary team     8  Hypothyroidism- TSH WNL    9  HTN- stable, 153/78  Most recent /79     10  Hypokalemia- K 2 8, replete      History of Present Illness   HPI: Lance Lozano is a 66y o  year old female with HTN, HLD, and hypothyroidism who presented from home via EMS when her niece found her unresponsive and blue  She was recently at [facility] due to an inability to care for herself and she was living with her niece while she recovered  Per EMS, she was 80% on room air when they arrived  In the ED she was placed on BiPAP with good response  She was found to have elevated creatinine, significantly elevated LFTs, an NT proBNP of 82K, and troponin of 11 with total CK 1194  Lactic acid initially 3 8 but quickly normalized  TSH WNL  Significant leukocytosis as well  UA + for nitrates  [facility] without acute findings  CT chest/abdomen/pelvis read as "centrally oriented alveolar opacities likely representing pulmonary edema though pneumonia is not entirely excluded  Small bilateral pleural effusions"  Cardiology has been consulted regarding her elevated troponin  EKG reviewed personally: NSR with nonspecific ST/T wave abnormality    Telemetry reviewed personally: NSR  Review of Systems   Constitution: Positive for malaise/fatigue  Negative for chills, decreased appetite, diaphoresis and weight gain (unsure, does not weigh herself)  Cardiovascular: Positive for leg swelling  Negative for chest pain, orthopnea, palpitations and syncope  Respiratory: Positive for shortness of breath  Negative for cough, sleep disturbances due to breathing and sputum production  Gastrointestinal: Negative for bloating, abdominal pain, nausea and vomiting  Neurological: Positive for weakness  Negative for dizziness and light-headedness  All other systems reviewed and are negative  Historical Information   Past Medical History:   Diagnosis Date    Disease of thyroid gland     Hyperlipemia     Hypertension      History reviewed  No pertinent surgical history    Social History     Substance and Sexual Activity   Alcohol Use Not Currently     Social History     Substance and Sexual Activity   Drug Use Never     Social History     Tobacco Use   Smoking Status Never Smoker   Smokeless Tobacco Never Used     Family History: History reviewed  No pertinent family history  Meds/Allergies   all current active meds have been reviewed and current meds:   Current Facility-Administered Medications   Medication Dose Route Frequency    acetylcysteine (ACETADOTE) 7,840 mg in dextrose 5 % 1,000 mL IVPB  100 mg/kg Intravenous Once    aluminum-magnesium hydroxide-simethicone (MYLANTA) 200-200-20 mg/5 mL oral suspension 5 mL  5 mL Oral Q6H PRN    amLODIPine (NORVASC) tablet 5 mg  5 mg Oral Daily    furosemide (LASIX) injection 40 mg  40 mg Intravenous BID (diuretic)    heparin (porcine) subcutaneous injection 5,000 Units  5,000 Units Subcutaneous Q8H [facility]    levothyroxine tablet 125 mcg  125 mcg Oral Early Morning    melatonin tablet 3 mg  3 mg Oral HS    metoprolol tartrate (LOPRESSOR) tablet 100 mg  100 mg Oral Q12H [facility]    ondansetron (ZOFRAN) injection 4 mg  4 mg Intravenous Q6H PRN    potassium chloride (K-DUR,KLOR-CON) CR tablet 40 mEq  40 mEq Oral Once    potassium chloride 10 % oral solution 40 mEq  40 mEq Oral TID    potassium chloride 20 mEq IVPB (premix)  20 mEq Intravenous Once          Allergies   Allergen Reactions    Asa [Aspirin]     Penicillins        Objective   Vitals: Blood pressure 142/79, pulse 81, temperature 97 6 °F (36 4 °C), temperature source Oral, resp  rate 18, height 5' (1 524 m), weight 78 8 kg (173 lb 11 6 oz), SpO2 92 %  , Body mass index is 33 93 kg/m²  ,     Systolic (28HMJ), VGI:902 , Min:130 , KJZ:157     Diastolic (16TUG), TDN:69, Min:70, Max:89        Intake/Output Summary (Last 24 hours) at 6/26/2020 0850  Last data filed at 6/26/2020 0801  Gross per 24 hour   Intake 1830 ml   Output 1900 ml   Net -70 ml     Wt Readings from Last 3 Encounters:   06/26/20 78 8 kg (173 lb 11 6 oz)     Invasive Devices     Peripheral Intravenous Line            Peripheral IV 06/25/20 Left Antecubital less than 1 day    Peripheral IV 06/25/20 Right Forearm less than 1 day          Drain External Urinary Catheter less than 1 day              Physical Exam   Constitutional: No distress  Unkempt appearing, elderly female  Sitting up in bed with no acute distress  Neck: Neck supple  Hepatojugular reflux and JVD present  Cardiovascular: Normal rate, regular rhythm, normal heart sounds and intact distal pulses  Exam reveals no gallop and no friction rub  No murmur heard  Pulmonary/Chest: Tachypnea noted  No respiratory distress  She has wheezes (expiratory)  She has rales  2LNC   Abdominal: Soft  Bowel sounds are normal  There is no tenderness  Musculoskeletal: Normal range of motion  She exhibits edema (1+ LE edema)  She exhibits no tenderness  Neurological: She is alert  Oriented to person and place  Skin: Skin is warm and dry  She is not diaphoretic  No erythema  Psychiatric:   Flat affect, slow responses       LABORATORY RESULTS:  Results from last 7 days   Lab Units 06/26/20  0528 06/26/20 0152 06/25/20  1807 06/25/20  1532   CK TOTAL U/L  --   --   --  1,194*   TROPONIN I ng/mL 8 01* 8 02* 10 20* 11 00*   CK MB INDEX %  --   --   --  1 1     CBC with diff:   Results from last 7 days   Lab Units 06/26/20 0528 06/26/20 0152 06/25/20  1532   WBC Thousand/uL 23 63*  --  23 86*   HEMOGLOBIN g/dL 11 5  --  12 6   HEMATOCRIT % 35 8  --  39 2   MCV fL 99*  --  100*   PLATELETS Thousands/uL 257 252 290   MCH pg 31 7  --  32 1   MCHC g/dL 32 1  --  32 1   RDW % 13 7  --  13 7   MPV fL 12 7 12 5 12 5   NRBC AUTO /100 WBCs 0  --  0       CMP:  Results from last 7 days   Lab Units 06/26/20  0528 06/25/20  1532   POTASSIUM mmol/L 2 8* 3 5   CHLORIDE mmol/L 104 101   CO2 mmol/L 25 24   BUN mg/dL 52* 51*   CREATININE mg/dL 1 91* 2 48*   CALCIUM mg/dL 7 8* 8 1*   AST U/L 1,293* 3,174*   ALT U/L 1,473* 2,164*   ALK PHOS U/L 89 111   EGFR ml/min/1 73sq m 25 18       BMP:  Results from last 7 days   Lab Units 06/26/20  0528 06/25/20  1532   POTASSIUM mmol/L 2 8* 3 5   CHLORIDE mmol/L 104 101 CO2 mmol/L 25 24   BUN mg/dL 52* 51*   CREATININE mg/dL 1 91* 2 48*   CALCIUM mg/dL 7 8* 8 1*       Lab Results   Component Value Date    CREATININE 1 91 (H) 06/26/2020    CREATININE 2 48 (H) 06/25/2020       Lab Results   Component Value Date    NTBNP 82,271 (H) 06/25/2020          Results from last 7 days   Lab Units 06/26/20  0528   MAGNESIUM mg/dL 2 1                 Results from last 7 days   Lab Units 06/26/20  0528   TSH 3RD GENERATON uIU/mL 1 390       Results from last 7 days   Lab Units 06/26/20  0152 06/25/20  1532   INR  1 61* 1 53*     Imaging: I have personally reviewed pertinent reports  and I have personally reviewed pertinent films in PACS  Ct Chest Abdomen Pelvis Wo Contrast    Result Date: 6/25/2020  Narrative: CT CHEST, ABDOMEN AND PELVIS WITHOUT IV CONTRAST INDICATION:   found down, hypoxic  COMPARISON:  None  TECHNIQUE: CT examination of the chest, abdomen and pelvis was performed without intravenous contrast   Axial, sagittal, and coronal 2D reformatted images were created from the source data and submitted for interpretation  Radiation dose length product (DLP) for this visit:  1097 17 mGy-cm   This examination, like all CT scans performed in the [facility], was performed utilizing techniques to minimize radiation dose exposure, including the use of iterative reconstruction and automated exposure control  Enteric contrast was administered  FINDINGS: CHEST LUNGS:  Bilateral centrally oriented alveolar opacities are seen likely representing pulmonary edema although pneumonia is not entirely excluded  Elevated right hemidiaphragm is noted  Associated right lung base atelectasis is present  There is no tracheal or endobronchial lesion  PLEURA:  Small bilateral pleural effusions are present  HEART/GREAT VESSELS:  Unremarkable for patient's age  MEDIASTINUM AND SINA:  Unremarkable  CHEST WALL AND LOWER NECK:   Unremarkable  ABDOMEN LIVER/BILIARY TREE:  Unremarkable  GALLBLADDER:  No calcified gallstones  No pericholecystic inflammatory change  SPLEEN:  Unremarkable  PANCREAS:  Unremarkable  ADRENAL GLANDS:  Unremarkable  KIDNEYS/URETERS:  5 mm nonobstructing right renal pelvis calculus is identified  Left kidney is unremarkable  STOMACH AND BOWEL:  Large volume of rectal stool is present  APPENDIX:  No findings to suggest appendicitis  ABDOMINOPELVIC CAVITY:  No ascites  No pneumoperitoneum  No lymphadenopathy  VESSELS:  Unremarkable for patient's age  PELVIS REPRODUCTIVE ORGANS:  Surgical changes of prior hysterectomy  URINARY BLADDER:  Unremarkable  ABDOMINAL WALL/INGUINAL REGIONS:  Unremarkable  OSSEOUS STRUCTURES:  No acute fracture or destructive osseous lesion  Impression: Bilateral centrally oriented alveolar opacities likely representing pulmonary edema though pneumonia is not entirely excluded  Small bilateral pleural effusions  Elevated right hemidiaphragm with associated right lung base atelectasis  No acute intra-abdominal abnormality  Nonobstructing right renal pelvis calculus  Workstation performed: [id number]     Ct Head Wo Contrast    Result Date: 6/25/2020  Narrative: CT BRAIN - WITHOUT CONTRAST INDICATION:   ams  COMPARISON:  None  TECHNIQUE:  CT examination of the brain was performed  In addition to axial images, coronal 2D reformatted images were created and submitted for interpretation  Radiation dose length product (DLP) for this visit:  862 05 mGy-cm   This examination, like all CT scans performed in the [facility], was performed utilizing techniques to minimize radiation dose exposure, including the use of iterative  reconstruction and automated exposure control  IMAGE QUALITY:  Diagnostic  FINDINGS: PARENCHYMA:  No intracranial mass, mass effect or midline shift  No CT signs of acute infarction  No acute parenchymal hemorrhage  VENTRICLES AND EXTRA-AXIAL SPACES:  Normal for the patient's age   VISUALIZED ORBITS AND PARANASAL SINUSES:  Unremarkable  CALVARIUM AND EXTRACRANIAL SOFT TISSUES:  Normal      Impression: No acute intracranial abnormality  Workstation performed: [id number]     Thank you for allowing us to participate in this patient's care  Counseling / Coordination of Care  Total floor / unit time spent today 45 minutes  Greater than 50% of total time was spent with the patient and / or family counseling and / or coordination of care  A description of the counseling / coordination of care: Review of history, current assessment, development of a plan  Code Status: Level 1 - Full Code    ** Please Note: Dragon 360 Dictation voice to text software may have been used in the creation of this document   **

## 2020-06-26 NOTE — PHYSICAL THERAPY NOTE
PHYSICAL THERAPY Evaluation NOTE    Patient Name: Jackelin Olmos  TVDMJ'G Date: 6/26/2020   AGE:   66 y o  Mrn:   [id number]  ADMIT DX:  Pulmonary edema [J81 1]  Respiratory distress [R06 03]  Transaminitis [R74 0]  Hypoxia [R09 02]  Elevated troponin [R79 89]  Elevated LFTs [R79 89]  PHILIPPE (acute kidney injury) ([facility] [address] ) [N17 9]    Past Medical History:   Diagnosis Date    Disease of thyroid gland     Hyperlipemia     Hypertension      Length Of Stay: 1  PHYSICAL THERAPY EVALUATION :      06/26/20 1003   Note Type   Note type Eval/Treat   Pain Assessment   Pain Assessment Tool FLACC   Pain Rating: FLACC (Rest) - Face 0   Pain Rating: FLACC (Rest) - Legs 0   Pain Rating: FLACC (Rest) - Activity 0   Pain Rating: FLACC (Rest) - Cry 0   Pain Rating: FLACC (Rest) - Consolability 0   Score: FLACC (Rest) 0   Pain Rating: FLACC (Activity) - Face 1   Pain Rating: FLACC (Activity) - Legs 0   Pain Rating: FLACC (Activity) - Activity 0   Pain Rating: FLACC (Activity) - Cry 1   Pain Rating: FLACC (Activity) - Consolability 1   Score: FLACC (Activity) 3   Home Living   Type of Home House   Home Layout Two level;Stairs to enter without rails   Bathroom Shower/Tub Tub/shower unit   Bathroom Toilet Standard   Additional Comments Info per niece: Pt lives in [facility] with 1STE  1/2 bath on first floor, tub bathroom on 2nd floor as well as bedroom  Prior Function   Level of [place] Needs assistance with IADLs; Needs assistance with ADLs and functional mobility  (supervision with ADLs, assist from niece with IADLs)   Lives With Family  (niece)   Receives Help From Family   ADL Assistance Needs assistance   IADLs Needs assistance   Falls in the last 6 months 1 to 4   Vocational Retired   Chris [address] per niece: Pt lives with niece who is home and able to provide assistance throughout the day  Pt required assistance with ADLs, IADLs, and functional mobility  No AD use at baseline  +drives   Restrictions/Precautions   Weight Bearing Precautions Per Order No   Other Precautions Cognitive; Chair Alarm; Bed Alarm;Multiple lines;Telemetry;O2;Fall Risk;Pain  (external catheter, telemetry, 4L NC O2)   General   Additional Pertinent History Pt is a 65 yo F who presents with acute hepatic encephalopathy following chronic tylenol use   Family/Caregiver Present No   Cognition   Overall Cognitive Status Impaired   Arousal/Participation Lethargic   Orientation Level Oriented to person;Disoriented to place; Disoriented to time;Disoriented to situation   Memory Unable to assess   Following Commands Follows one step commands inconsistently   Comments Pt was lethargic during today's session and inconsistent with responsiveness to questions  Requires intermittent reorientation of situation  Occasional grimacing with functional mobility  Initially resistant to therapy session but willing to perform some mobility   RLE Assessment   RLE Assessment   (functionally graded <3+/5)   LLE Assessment   LLE Assessment   (functionally graded <3+/5)   Coordination   Movements are Fluid and Coordinated 0   Coordination and Movement Description bradykinetic functional mobility   Bed Mobility   Supine to Sit 2  Maximal assistance   Additional items Assist x 2; Increased time required;Verbal cues;LE management  (HHA x1 to reach across her body  VC for LE sequencing )   Sit to Supine 2  Maximal assistance   Additional items Assist x 2; Increased time required;Verbal cues;LE management  (for LE support and truncal assistance)   Additional Comments Pt supine in bed at start and end of PT session  SpO2 was at 90% at start of session and stayed relatively unchanged throughout treatment  Pt was on 4L SpO2  BP measured in supine: 130/69  Seated EOB: 112/68  Standin/71   Transfers   Sit to Stand 3  Moderate assistance  (performed x2  Variable (Min-ModAx1) assistance)   Additional items Assist x 1; Increased time required;Verbal cues  (to push up from the bed and to lean forward)   Stand to Sit 3  Moderate assistance   Additional items Assist x 1; Increased time required;Verbal cues  (to reach back and control descent)   Balance   Static Sitting Poor +   Dynamic Sitting Poor   Static Standing Poor   Dynamic Standing Poor   Ambulatory Poor   Endurance Deficit   Endurance Deficit Yes   Endurance Deficit Description Increased need for assistance with activities with fatigue   Activity Tolerance   Activity Tolerance Patient limited by fatigue   Medical Staff Made Aware Spoke to PCA   Nurse Made Aware Spoke to RN   Assessment   Prognosis Fair   Problem List Decreased strength;Decreased range of motion;Decreased endurance; Impaired balance;Decreased mobility; Decreased coordination;Decreased cognition; Impaired judgement;Decreased safety awareness   Assessment Pt is a 67 yo F who presents with acute hepatic encephalopathy following chronic tylenol use  order placed for PT eval and tx, w/ activity order of up w/ A  pt presents w/ comorbidities of PHILIPPE, elevated troponin, leukocytosis, acute respiratory failure with hypoxia and hypercapnia, acute congestive heart failure and personal factors of living in 2 story house, stair(s) to enter home, communication issues, inability to ambulate household distances, inability to navigate community distances, inability to navigate level surfaces w/o external assistance, unable to perform dynamic tasks in community, decreased cognition, compliance, unable to perform physical activity, limited insight into impairments, inability to perform IADLs, inability to perform ADLs and inability to live alone  pt presents w/ pain, weakness, decreased ROM, decreased endurance, impaired cognition, impaired balance, decreased safety awareness, impaired judgment, fall risk and LE edema   these impairments are evident in findings from physical examination (weakness, decreased ROM, impaired coordination and edema of extremities), mobility assessment (need for Mod assist w/ all phases of mobility when usually mobilizing independently, and need for cueing for mobility technique), and Barthel Index: 10/100  pt needed input for task focus and mobility technique  pt is at risk for falls due to physical and safety awareness deficits  pt's clinical presentation is unstable/unpredictable (evident in poor blood pressure control, need for assist w/ all phases of mobility when usually mobilizing independently, need for supplemental oxygen in order to maintain oxygen saturation, need for input for mobility technique, need for input for task focus and mobility technique and need for input for mobility technique/safety)  pt needs inpatient PT tx to improve mobility deficits  discharge recommendation is for inpatient rehab to reduce fall risk and maximize level of functional independence  PT to see for continued evaluation of gait  Goals   Patient Goals none stated at this time   STG Expiration Date 07/06/20   Short Term Goal #1 pt will: Increase bilateral LE strength 1/2 grade to facilitate independent mobility, Perform all bed mobility tasks w/ supervision to decrease fall risk factors, Perform all transfers w/ supervision to improve independence, and Improve Barthel Index score to 35 or greater to facilitate independence  PT to see for gait and stair assessment when appropriate  PT Treatment Day 0   Plan   Treatment/Interventions Functional transfer training;LE strengthening/ROM; Therapeutic exercise; Endurance training;Cognitive reorientation;Patient/family training;Equipment eval/education; Bed mobility;Gait training;Spoke to nursing   PT Frequency Other (Comment)  (3-5x/wk)   Recommendation   PT Discharge Recommendation Post-Acute Rehabilitation Services   Equipment Recommended Walker   PT - OK to Discharge Yes   Additional Comments to rehab when medically appropriate   Barthel Index   Feeding 5   Bathing 0   Grooming Score 0 Dressing Score 0   Bladder Score 0   Bowels Score 0   Toilet Use Score 0   Transfers (Bed/Chair) Score 5   Mobility (Level Surface) Score 0   Stairs Score 0   Barthel Index Score 10       Skilled PT recommended while in hospital and upon DC to progress pt toward treatment goals       Caesar Mauro, AMELIA 6/26/2020

## 2020-06-26 NOTE — CONSULTS
Consult- Katie Durbin 1945, 66 y o  female MRN: [id number]    Unit/Bed#: [facility] 724-01 Encounter: [id number]    Primary Care Provider: No primary care provider on file  Date and time admitted to hospital: 6/25/2020  3:20 PM      Consult to [facility] performed by: Rosalia De La Torre PA-C  Consult ordered by:  Chencho Hannah MD          Acute congestive heart failure [facility])  Assessment & Plan  Wt Readings from Last 3 Encounters:   06/25/20 78 3 kg (172 lb 9 9 oz)     - NT-BNP 82,271  - Aggressive diuresis  - Formal Echocardiogram  - Consider cardiology consult        Acute respiratory failure with hypoxia and hypercapnia (HCC)  Assessment & Plan  - Continue biPAP support, can attempt to wean once volume status is addressed  - Optimize volume status  - Consider neb treatments  - Aggressive pulm toilet  - Critical Care to follow as CCRS consult    Leukocytosis  Assessment & Plan  - Continue to monitor  - initially elevated lactic, since cleared  - UA negative  - Trend WBC, monitor temperatures    Elevated INR  Assessment & Plan  - Likely related to liver dysfunction  - Monitor for bleeding, trend INR    Transaminitis  Assessment & Plan  - Likely congestive hepatopathy related to heart failure  - Continue to trend  - Did receive NAC in ED  - Acetaminophen level normal although family expressed concern that patient over-uses tylenol PM    Elevated troponin  Assessment & Plan  - Continue to trend  - Cardiology consult  - No EKG changes noted  - Recommend telemetry monitoring    PHILIPPE (acute kidney injury) ([facility] [address] )  Assessment & Plan  - Likely cardiorenal in setting of markedly elevated BNP  - Trend UOP, BUN/Cr  - Place myers for accurate I&Os    * Acute hepatic encephalopathy  Assessment & Plan  - Check ammonia  - Regular neuro checks  - CT head negative  - No obvious focal deficits on exam  - Consider other sources of encephalopathy      -------------------------------------------------------------------------------------------------------------  Chief Complaint: AMS, hypoxia    History of Present Illness     Solitario Moreno is a 67 y/o female with unknown pmhx who was found unresponsive and cyanotic by family in respiratory distress  Hypoxic to 80's, EMS placed pt on CPAP with improvement of mental status and oxygenation  Placed on biPAP in ED  Of note recently admitted to [facility] for failure to thrive and discharged home approximately 3 weeks ago  Her niece has been helping her since discharge and she has been staying with her up until today  On workup in the ED noted to have markedly elevated AST/ALT as well as elevated BUN/Cr (unknown baseline) and elevated troponin without EKG changes  Critical care consulted 2/2 patient requiring biPAP  History obtained from chart review  -------------------------------------------------------------------------------------------------------------  Dispo: Admit to Stepdown Level 1 if unable to wean from biPAP    Code Status: Level 1 - Full Code  --------------------------------------------------------------------------------------------------------------  ROS: Unable to complete 2/2 patient mental status    Review of systems was unable to be performed secondary to mental status    Physical Exam  General: Resting in bed, on bipap  Neuro: Awakens easily, moving all extremities, oriented to self, no obvious focal deficits  CV: HRR, 2+ peripheral pulses  Pulm:  On BiPAP 15/5 60%, Rales b/l with diminished bases  GI: Abd soft, non-tender  MSK: Extremities atraumatic  Skin: Warm, dry    --------------------------------------------------------------------------------------------------------------  Vitals:   Vitals:    06/25/20 2030 06/25/20 2100 06/25/20 2200 06/25/20 2326   BP: 147/72 153/74 158/75 130/86   BP Location:       Pulse: 92 91 92 93   Resp: 20 20 15 20   Temp:    97 6 °F (36 4 °C)   TempSrc:    Oral   SpO2: 96% 98% 97% 99%   Weight:    78 3 kg (172 lb 9 9 oz)   Height:    5' (1 524 m)     Temp  Min: 97 6 °F (36 4 °C)  Max: 99 1 °F (37 3 °C)  IBW: 45 5 kg  Height: 5' (152 4 cm)  Body mass index is 33 71 kg/m²  Laboratory and Diagnostics:  Results from last 7 days   Lab Units 06/26/20  0152 06/25/20  1532   WBC Thousand/uL  --  23 86*   HEMOGLOBIN g/dL  --  12 6   HEMATOCRIT %  --  39 2   PLATELETS Thousands/uL 252 290   NEUTROS PCT %  --  85*   MONOS PCT %  --  6     Results from last 7 days   Lab Units 06/25/20  1532   SODIUM mmol/L 138   POTASSIUM mmol/L 3 5   CHLORIDE mmol/L 101   CO2 mmol/L 24   ANION GAP mmol/L 13   BUN mg/dL 51*   CREATININE mg/dL 2 48*   CALCIUM mg/dL 8 1*   GLUCOSE RANDOM mg/dL 163*   ALT U/L 2,164*   AST U/L 3,174*   ALK PHOS U/L 111   ALBUMIN g/dL 3 4*   TOTAL BILIRUBIN mg/dL 0 69          Results from last 7 days   Lab Units 06/26/20  0152 06/25/20  1532   INR  1 61* 1 53*   PTT seconds 40*  --       Results from last 7 days   Lab Units 06/26/20  0152 06/25/20  1807 06/25/20  1532   TROPONIN I ng/mL 8 02* 10 20* 11 00*     Results from last 7 days   Lab Units 06/25/20  1844 06/25/20  1649   LACTIC ACID mmol/L 1 9 3 8*     ABG:    VBG:  Results from last 7 days   Lab Units 06/25/20  1532   PH LILLY  7 270*   PCO2 LILLY mm Hg 55 8*   PO2 LILLY mm Hg 49 8*   HCO3 LILLY mmol/L 25 0   BASE EXC LILLY mmol/L -2 7           Micro:        EKG: SR on tele  Imaging: I have personally reviewed pertinent reports  and I have personally reviewed pertinent films in PACS    CT head wo contrast   Final Result      No acute intracranial abnormality  Workstation performed: [id number]         CT chest abdomen pelvis wo contrast   Final Result      Bilateral centrally oriented alveolar opacities likely representing pulmonary edema though pneumonia is not entirely excluded  Small bilateral pleural effusions        Elevated right hemidiaphragm with associated right lung base atelectasis  No acute intra-abdominal abnormality  Nonobstructing right renal pelvis calculus  Workstation performed: [id number]               Historical Information   Past Medical History:   Diagnosis Date    Disease of thyroid gland     Hyperlipemia     Hypertension      History reviewed  No pertinent surgical history  Social History   Social History     Substance and Sexual Activity   Alcohol Use Not Currently     Social History     Substance and Sexual Activity   Drug Use Never     Social History     Tobacco Use   Smoking Status Never Smoker   Smokeless Tobacco Never Used     Family History:   History reviewed  No pertinent family history  I have reviewed this patient's family history and commented on sigificant items within the HPI      Medications:  Current Facility-Administered Medications   Medication Dose Route Frequency    acetylcysteine (ACETADOTE) 3,920 mg in dextrose 5 % 500 mL IVPB  50 mg/kg Intravenous Once    acetylcysteine (ACETADOTE) 7,840 mg in dextrose 5 % 1,000 mL IVPB  100 mg/kg Intravenous Once    aluminum-magnesium hydroxide-simethicone (MYLANTA) 200-200-20 mg/5 mL oral suspension 5 mL  5 mL Oral Q6H PRN    amLODIPine (NORVASC) tablet 5 mg  5 mg Oral Daily    heparin (porcine) subcutaneous injection 5,000 Units  5,000 Units Subcutaneous Q8H [address]    levothyroxine tablet 125 mcg  125 mcg Oral Early Morning    melatonin tablet 3 mg  3 mg Oral HS    metoprolol tartrate (LOPRESSOR) tablet 100 mg  100 mg Oral Q12H [address]    multi-electrolyte (PLASMALYTE-A/ISOLYTE-S PH 7 4) IV solution  100 mL/hr Intravenous Continuous    ondansetron (ZOFRAN) injection 4 mg  4 mg Intravenous Q6H PRN     Home medications:  Prior to Admission Medications   Prescriptions Last Dose Informant Patient Reported? Taking?    amLODIPine (NORVASC) 5 mg tablet   Yes Yes   Sig: Take 5 mg by mouth daily   atorvastatin (LIPITOR) 10 mg tablet   Yes Yes   Sig: Take 10 mg by mouth daily   ciprofloxacin (CIPRO) 750 mg tablet   Yes Yes   Sig: Take 750 mg by mouth every 24 hours   levothyroxine 125 mcg tablet   Yes Yes   Sig: Take 125 mcg by mouth daily   metoprolol tartrate (LOPRESSOR) 100 mg tablet   Yes Yes   Sig: Take 100 mg by mouth every 12 (twelve) hours   traZODone (DESYREL) 100 mg tablet   Yes Yes   Sig: Take 200 mg by mouth daily at bedtime      Facility-Administered Medications: None     Allergies: Allergies   Allergen Reactions    Asa [Aspirin]     Penicillins      ------------------------------------------------------------------------------------------------------------  Advance Directive and Living Will:      Power of :    POLST:    ------------------------------------------------------------------------------------------------------------  Care Time Delivered:   No Critical Care time spent       Ivett Peoples PA-C        Portions of the record may have been created with voice recognition software  Occasional wrong word or "sound a like" substitutions may have occurred due to the inherent limitations of voice recognition software    Read the chart carefully and recognize, using context, where substitutions have occurred

## 2020-06-26 NOTE — ASSESSMENT & PLAN NOTE
· Reportedly approximately more than 3 weeks ago, she was in [facility] for a total of 3 weeks likewise due to inability to care for self  According to the niece who is in the room, her house is unkept and the utilities are turned off  It seems also that there may be some psychiatric concerns that prompted her admission  She was released approximately 3 weeks ago as mentioned, stayed the first 2 days in a hotel due to the fact that her house is not functional   After that, she has stayed with her niece until today    · Niece states they have area on aging involved to assist with social support

## 2020-06-26 NOTE — ASSESSMENT & PLAN NOTE
- Likely congestive hepatopathy related to heart failure  - Continue to trend  - Did receive NAC in ED  - Acetaminophen level normal although family expressed concern that patient over-uses tylenol PM

## 2020-06-26 NOTE — ASSESSMENT & PLAN NOTE
- Likely cardiorenal in setting of markedly elevated BNP  - Trend UOP, BUN/Cr  - Place myers for accurate I&Os

## 2020-06-26 NOTE — ED NOTES
Silveranalia Clifton [phone] (Patient's daughter)  Would like an update when on the floor       Rody Tabares, LUCITA  06/25/20 [address], RN  06/25/20 4735

## 2020-06-26 NOTE — ASSESSMENT & PLAN NOTE
· Elevated troponin without history of chest pain  Patient currently appears very comfortable  Second troponin from 11 is 10 2  Will continue to watch  Likely related to tylenol toxicity   · Cardiology consult      · ECHO-  EF 35%

## 2020-06-26 NOTE — ASSESSMENT & PLAN NOTE
Elevated troponin without history of chest pain  Patient currently appears very comfortable  Second troponin from 11 is 10 2  Will continue to watch  Cardiology consult   CPK  Echocardiogram   (due to toxicity?   Due to elevated transaminases?)

## 2020-06-26 NOTE — ASSESSMENT & PLAN NOTE
Elevated INR 1 53 most likely secondary to coagulopathy due to current elevated transaminases/hepatic failure

## 2020-06-26 NOTE — CONSULTS
[address] [facility] 66 y o  female MRN: [id number]  Unit/Bed#: [facility] 724-01 Encounter: [id number]    ASSESSMENT and PLAN:  1  Acute kidney injury (POA):  · Suspected to be multifactorial - component of prerenal azotemia from fluid overload + possible heavy NSAID use + elevated CK  UA is not consistent with a GN or AIN but does have moderate blood with only 2-4 RBCs  No hydronephrosis on CT imaging  · At this time, the patient examines fluid overloaded and CT findings are consistent with vascular congestion  · Would recommend stopping IVF and start Furosemide 40 mg IV BID  · Trend creatinine and monitor I/O + avoid nephrotoxic medications  2  Respiratory failure:  · Suspected to be due to CHF + fluid overload  · Monitor with diuresis  3  Fluid overload:  · Unclear if has a history of CHF  Echo was been ordered  · Diurese with Furosemide 40 mg IV BID  · Keep O>I      4  Hypokalemia:  · Refusing oral potassium supplementation  · Will give KCl IV today  5  Transaminitis:  · Suspected to be Tylenol overdose  · On acetylcysteine  6  Abnormal cardiac biomarkers  · Troponin 11 0 on admission  · Echo and cards consult pending  7  Hypertension:  · BP is controlled  Monitor with diuresis  8  Elevated CK:  · CK level of 1194 on admission  · Likely to being down for an extended period of time  · No need for IVF since she is fluid overloaded  · Would simply promote urinary flow with diuretics  9  Encephalopathy  10  Pleural effusions  11  Leukocytosis    SUMMARY OF RECOMMENDATIONS:  · Stop IVF  · Start Furosemide 40 mg IV BID  · KCL 20 meq IV x 3 doses today  · Trend CK level  · Follow up echocardiogram    · Avoid NSAIDs  The above plan was discussed with ARI and RN  Previous records were personally reviewed by me    The images (CT) were personally reviewed by me in PACS    HISTORY OF PRESENT ILLNESS:  Requesting Physician: Vivek Rodriges, MD  Reason for Consult: PHILIPPE Cortez is a 66 y o  female who was admitted to [facility] on 6/25/20 after presenting with a change in mental status  A renal consultation is requested today for assistance in the management of acute kidney injury  The information contained in this consult was obtained upon review of the electronic medical record as patient is unable to give any type of meaningful history due to confusion  Unfortunately, the current information in the electronic medical system is limited  From what I gather, the patient has a history of hypertension, hyperlipidemia, hypothyroidism  She was apparently at [facility] for 3 weeks due to inability to take care for self, possibly some psychiatric issues too  Upon discharge, the patient has required the help of her niece due to not being functional enough to complete activities of daily living by herself  Over the past week, the patient was apparently noted to be increasingly lethargic  A few hours prior to admission, the patient's niece noted the patient to be unresponsive prompting a call to EMS  The patient was brought to [facility]  Upon arrival in the ER, she was found to be lethargic and was placed on BiPAP  Her blood work showed severe transaminitis and a creatinine of 2 48  The HPI indicates that the patient takes "too much" Tylenol  The patient was initially felt to be fluid overloaded in the ER in received a dose of furosemide 40 mg IV  Thereafter, she was placed on fluids and is currently running isolyte at 100 cc/hour  Upon my evaluation, the patient appeared to be in respiratory distress  She was on 2 L of nasal cannula with an O2 saturation of about 95-96%  She remained confused  On further questioning, she reports that she takes a lot of ibuprofen and Tylenol  The rest of the review of systems could not be obtained with accuracy as the patient was confused      No baseline creatinine could be obtained as there is no previous lab work in the [facility]  PAST MEDICAL HISTORY:  Past Medical History:   Diagnosis Date    Disease of thyroid gland     Hyperlipemia     Hypertension      PAST SURGICAL HISTORY:  History reviewed  No pertinent surgical history  ALLERGIES:  Allergies   Allergen Reactions    Asa [Aspirin]     Penicillins      SOCIAL HISTORY:  Social History     Substance and Sexual Activity   Alcohol Use Not Currently     Social History     Substance and Sexual Activity   Drug Use Never     Social History     Tobacco Use   Smoking Status Never Smoker   Smokeless Tobacco Never Used     FAMILY HISTORY:  History reviewed  No pertinent family history      MEDICATIONS:    Current Facility-Administered Medications:     acetylcysteine (ACETADOTE) 7,840 mg in dextrose 5 % 1,000 mL IVPB, 100 mg/kg, Intravenous, Once, Ravindra Cunningham MD, Last Rate: 65 mL/hr at 06/26/20 0639, 7,840 mg at 06/26/20 0639    aluminum-magnesium hydroxide-simethicone (MYLANTA) 200-200-20 mg/5 mL oral suspension 5 mL, 5 mL, Oral, Q6H PRN, Ravindra Cunningham MD    amLODIPine (NORVASC) tablet 5 mg, 5 mg, Oral, Daily, Ravindra Cunningham MD, 5 mg at 06/26/20 0815    furosemide (LASIX) injection 40 mg, 40 mg, Intravenous, BID (diuretic), Levofarida Blake MD, 40 mg at 06/26/20 0900    heparin (porcine) subcutaneous injection 5,000 Units, 5,000 Units, Subcutaneous, Q8H [address], 5,000 Units at 06/26/20 0637 **AND** [COMPLETED] Platelet count, , , Once, Ravindra Cunningham MD    levothyroxine tablet 125 mcg, 125 mcg, Oral, Early Morning, Ravindra Cunningham MD, 125 mcg at 06/26/20 8557    melatonin tablet 3 mg, 3 mg, Oral, HS, Ravindra Cunningham MD, 3 mg at 06/26/20 0032    metoprolol tartrate (LOPRESSOR) tablet 100 mg, 100 mg, Oral, Q12H [address], Ravindra Cunningham MD, 100 mg at 06/26/20 0815    ondansetron (ZOFRAN) injection 4 mg, 4 mg, Intravenous, Q6H PRN, Ravindra Cunningham MD    potassium chloride 10 % oral solution 40 mEq, 40 mEq, Oral, TID, Chad Blake MD    potassium chloride 20 mEq IVPB (premix), 20 mEq, Intravenous, Once, Kwame Souza MD, Last Rate: 50 mL/hr at 06/26/20 0900, 20 mEq at 06/26/20 0900    REVIEW OF SYSTEMS:  Could not obtain due to confusion  Please refer to HPI  PHYSICAL EXAM:  Current Weight: Weight - Scale: 78 8 kg (173 lb 11 6 oz)  First Weight: Weight - Scale: 78 3 kg (172 lb 9 9 oz)  Vitals:    06/26/20 0723 06/26/20 0727 06/26/20 0729 06/26/20 0743   BP: 147/70 139/78 142/79    BP Location: Left arm Left arm Left arm    Pulse: 75 89 87 81   Resp: 18 18 18    Temp: 97 6 °F (36 4 °C)      TempSrc: Oral      SpO2: 93% (!) 86% 90% 92%   Weight:       Height:           Intake/Output Summary (Last 24 hours) at 6/26/2020 0933  Last data filed at 6/26/2020 0801  Gross per 24 hour   Intake 1830 ml   Output 1900 ml   Net -70 ml     Physical Exam  General: conscious, confused, not following commands, in mild respiratory distress  Skin: warm, dry  Eyes: pink conjunctivae  ENT: nasal cannula in place, dry lips and mucous membranes  Neck: supple, JVP 8 cm  Chest/Lungs: clear breath sounds with occasional expiratory wheezing  CVS: distinct heart sounds, normal rate, regular rhythm  No rub  Abdomen: soft, non-tender, non-distended, normoactive bowel sounds  Extremities: no edema of extremities  : (+) myers catheter  Neuro: awake, alert, oriented x 1  Psych: could not eval due to confusion       Lab Results:   Results from last 7 days   Lab Units 06/26/20  0528 06/26/20  0152 06/25/20  1532   WBC Thousand/uL 23 63*  --  23 86*   HEMOGLOBIN g/dL 11 5  --  12 6   HEMATOCRIT % 35 8  --  39 2   PLATELETS Thousands/uL 257 252 290   POTASSIUM mmol/L 2 8*  --  3 5   CHLORIDE mmol/L 104  --  101   CO2 mmol/L 25  --  24   BUN mg/dL 52*  --  51*   CREATININE mg/dL 1 91*  --  2 48*   CALCIUM mg/dL 7 8*  --  8 1*   MAGNESIUM mg/dL 2 1  --   --    PHOSPHORUS mg/dL 2 9  --   --    ALK PHOS U/L 89  --  111   ALT U/L 1,473*  --  2,164*   AST U/L 1,293*  --  3,174*     Other Studies:   CT scan of the chest, abdomen, and pelvis was reviewed personally by me and PACS - my interpretation is (+) vascular congestion, no hydronephrosis

## 2020-06-26 NOTE — ASSESSMENT & PLAN NOTE
Patient was found unresponsive most likely secondary to hepatic encephalopathy in relation to chronic ingestion of Tylenol p m  with resultant elevation of LFTs currently in the 3000  At this point, will place patient on acetylcysteine despite acetaminophen level is less than 2  Will recheck acetaminophen level for tomorrow  Toxicology consult  (As per protocol, can discontinue acetylcysteine after 12 hours treatment should acetaminophen level be less than 20 and LFTs start to improve )  GI consult  Appreciate critical care consult

## 2020-06-26 NOTE — CONSULTS
[address] Gastroenterology   Luciano Garcia 66 y o  female MRN: [id number]  Unit/Bed#: PPHP 724-01 Encounter: [id number]    Code Status: Level 1 - Full Code  POA:      Reason for Admission / Principal Problem: Altered mental status  Reason for Consult: Elevated liver enzymes     Impression:  Patient Active Problem List   Diagnosis    Acute hepatic encephalopathy    PHILIPPE (acute kidney injury) ([address] )    Elevated troponin    Transaminitis    Elevated INR    Leukocytosis    Acute respiratory failure with hypoxia and hypercapnia (HCC)    Acute congestive heart failure (HCC)       A/P:    Patient is a 66year old female with a past medical history of depression, hypertension, hypothyroidism who presented with change in mental status and progressive lethargy since 1 week and was found to have severe elevation of liver enzymes without jaundice    Severe elevation of liver enzymes without jaundice   · Hepatocellular pattern of elevation of liver enzymes   · Differential diagnosis includes drug induced liver injury most likely due to ciprofloxacin vs ischemic hepatitis vs autoimmune hepatitis  Ciprofloxacin is known to cause severe elevation in a hepatocellular pattern 2 days to 2 weeks of starting it and can cause coagulopathy     · Patient with signs of acute liver failure including encephalopathy and coagulopathy  · [facility] criteria is 1- does not meet transplant or referral criteria at the moment  · Patient received NAC however tylenol levels were normal   · Check RUQ ultrasound with liver dopplers, hepatitis panel, EBV, CMV, cerruloplasmin, anti smooth muscle antibody, anti LKM, ARMAND  · Follow up on results and continue to monitor liver enzymes, can consider liver biopsy if she is worsening   · Discontinue hepatotoxic medications       HPI: Luciano Garcia is a 66 y o  female with a past medical history of depression and hypertension who presented with acute change in mental status  Patients sari reported that patient was getting increasingly lethargic this past week  GI has been consulted for elevated liver enzymes  Patient denies any abdominal pain, nausea, vomiting  She does say she was on antibiotics recently for infection of her toe for which she was admitted to [facility]    She says she takes too much tylenol sometimes but is unable to quantify it  She also says she takes herbal supplements which include melatonin and "something else"  She is slightly lethargic and unable to give a clear history     History obtained from chart review since patient is lethargic   PMH:  Past Medical History:   Diagnosis Date    Disease of thyroid gland     Hyperlipemia     Hypertension         PSH:  History reviewed  No pertinent surgical history  Allergies: Allergies   Allergen Reactions    Asa [Aspirin]     Penicillins        Family History: History reviewed  No pertinent family history  Social History:   Social History     Tobacco Use   Smoking Status Never Smoker   Smokeless Tobacco Never Used      Social History     Substance and Sexual Activity   Alcohol Use Not Currently      Social History     Substance and Sexual Activity   Drug Use Never        Home Medications:   Prior to Admission medications    Medication Sig Start Date End Date Taking?  Authorizing Provider   amLODIPine (NORVASC) 5 mg tablet Take 5 mg by mouth daily   Yes Historical Provider, MD   atorvastatin (LIPITOR) 10 mg tablet Take 10 mg by mouth daily   Yes Historical Provider, MD   ciprofloxacin (CIPRO) 750 mg tablet Take 750 mg by mouth every 24 hours   Yes Historical Provider, MD   levothyroxine 125 mcg tablet Take 125 mcg by mouth daily   Yes Historical Provider, MD   metoprolol tartrate (LOPRESSOR) 100 mg tablet Take 100 mg by mouth every 12 (twelve) hours   Yes Historical Provider, MD   traZODone (DESYREL) 100 mg tablet Take 200 mg by mouth daily at bedtime   Yes Historical Provider, MD       ROS:   Review of Systems   Unable to perform ROS: Mental status change       Vitals:   Vitals:    06/26/20 0723 06/26/20 0727 06/26/20 0729 06/26/20 0743   BP: 147/70 139/78 142/79    BP Location: Left arm Left arm Left arm    Pulse: 75 89 87 81   Resp: 18 18 18    Temp: 97 6 °F (36 4 °C)      TempSrc: Oral      SpO2: 93% (!) 86% 90% 92%   Weight:       Height:         Temp  Min: 97 6 °F (36 4 °C)  Max: 99 1 °F (37 3 °C)  IBW: 45 5 kg  Body mass index is 33 93 kg/m²  PHYSICAL EXAM:  Physical Exam   Constitutional: She appears well-developed and well-nourished  Eyes: Pupils are equal, round, and reactive to light  No scleral icterus  Cardiovascular: Normal rate, regular rhythm and normal heart sounds  Pulmonary/Chest: She is in respiratory distress  Abdominal: Soft  Bowel sounds are normal  She exhibits no distension  There is no tenderness  Neurological: GCS eye subscore is 4  GCS verbal subscore is 5  GCS motor subscore is 6  Asterixis present  Patient is somnolent      Skin: Skin is warm         Labs:   Results from last 7 days   Lab Units 06/26/20  0528 06/26/20  0152 06/25/20  1532   WBC Thousand/uL 23 63*  --  23 86*   HEMOGLOBIN g/dL 11 5  --  12 6   HEMATOCRIT % 35 8  --  39 2   PLATELETS Thousands/uL 257 252 290   NEUTROS PCT % 84*  --  85*   MONOS PCT % 8  --  6     Results from last 7 days   Lab Units 06/26/20  0528 06/25/20  1532   POTASSIUM mmol/L 2 8* 3 5   CHLORIDE mmol/L 104 101   CO2 mmol/L 25 24   BUN mg/dL 52* 51*   CREATININE mg/dL 1 91* 2 48*   CALCIUM mg/dL 7 8* 8 1*   ALK PHOS U/L 89 111   ALT U/L 1,473* 2,164*   AST U/L 1,293* 3,174*     Results from last 7 days   Lab Units 06/26/20  0528   MAGNESIUM mg/dL 2 1     Lab Results   Component Value Date    PHOS 2 9 06/26/2020      Results from last 7 days   Lab Units 06/26/20  0152 06/25/20  1532   INR  1 61* 1 53*   PTT seconds 40*  --      No results found for: TROPONINT  ABG:No results found for: PHART, XQF1QTD, PO2ART, PVS7BHM, D5OZMKGG, BEART, SOURCE    Imaging: I have personally reviewed pertinent reports  EKG: This was personally reviewed by myself     Micro:  Blood Culture: No results found for: BLOODCX  Urine Culture: No results found for: URINECX  Sputum Culture: No components found for: SPUTUMCX  Wound Culure: No results found for: Marj Machuca MD  6/26/2020 10:36 AM

## 2020-06-26 NOTE — H&P
H&P- Deann Aviles 1945, 66 y o  female MRN: [id number]    Unit/Bed#: ED 06 Encounter: [id number]    Primary Care Provider: No primary care provider on file  Date and time admitted to hospital: 6/25/2020  3:20 PM  Primary care provider from [address] [place] [address]  Most likely as a result of chronic Tylenol toxicity as patient likes taking Tylenol p m  without telling others  She is also on some medications which are metabolized hepatically  Will discontinue those medications  Hold off on any Tylenol  GI consult  CMP for tomorrow  Elevated troponin  Assessment & Plan  Elevated troponin without history of chest pain  Patient currently appears very comfortable  Second troponin from 11 is 10 2  Will continue to watch  Cardiology consult   CPK  Echocardiogram   (due to toxicity? Due to elevated transaminases?)    PHILIPPE (acute kidney injury) [facility])  Assessment & Plan  Nephrology consult  Will also place patient on isolate hydration  Recheck metabolic profile renal numbers tomorrow  * Acute hepatic encephalopathy  Assessment & Plan  Patient was found unresponsive most likely secondary to hepatic encephalopathy in relation to chronic ingestion of Tylenol p m  with resultant elevation of LFTs currently in the 3000  At this point, will place patient on acetylcysteine despite acetaminophen level is less than 2  Will recheck acetaminophen level for tomorrow  Toxicology consult  (As per protocol, can discontinue acetylcysteine after 12 hours treatment should acetaminophen level be less than 20 and LFTs start to improve )  GI consult  Appreciate critical care consult  Acute respiratory failure with hypoxia and hypercapnia (HCC)  Assessment & Plan  Secondary to hypercapnia due to decreased sensorium  Currently patient is much more awake and feels comfortable  She is also communicative    Will discontinue BiPAP in favor of nasal cannula oxygen  Leukocytosis  Assessment & Plan  Will recheck for tomorrow  Stress reaction? Elevated INR  Assessment & Plan  Elevated INR 1 53 most likely secondary to coagulopathy due to current elevated transaminases/hepatic failure  Please call the following contact: Gorge Bob [phone] (niece); and she has a daughter who lives in [place] would like update especially if patient critical: Mallorie Fermin [phone]    I also spoke to Mallorie Fermin [phone] and currently code status is full  VTE Prophylaxis: Heparin  / sequential compression device   Code Status: No Order full Code as discussed with patient  She cannot decide for now  POLST: There is no POLST form on file for this patient (pre-hospital)    Anticipated Length of Stay:  Patient will be admitted on an Inpatient basis with an anticipated length of stay of  greater than 2 midnights  Justification for Hospital Stay: Please see detailed plans noted above  Chief Complaint:     Was found unresponsive and blue  History of Present Illness:  Anastasia Sullivan is a 66 y o  female who has an unknown past medical history although she does have insomnia and depression, hyperlipidemia and benign essential hypertension  She denies any heart disease  Reportedly approximately more than 3 weeks ago, she was in [facility] for a total of 3 weeks likewise due to inability to care for self  According to the niece who is in the room, her house is unkept and the utilities are turned off  It seems also that there may be some psychiatric concerns that prompted her admission  She was released approximately 3 weeks ago as mention, stayed the first 2 days in a hotel due to the fact that her house is not functional   After that, she has stayed with her niece until today  Also within the past 3 weeks, and antibiotic ciprofloxacin was called due to unknown reasons  But she denies any fever  She also denies any cough or cold or chest pain    Over the past week, it seems that she has been increasingly becoming lethargic  However the knees as well as other family members did not think much about it  Noted to is that she takes multiple medications including trazodone, ciprofloxacin, Lipitor, amlodipine, metoprolol  Just this late afternoon, she was noted by the niece's daughter that she appears to be blue and unresponsive  This prompted calling EMS with then brought her to the emergency room  Initially she was lethargic and was therefore placed on BiPAP with good resolution of her mental status  Surprisingly she has elevated AST ALT amounting to 3174 and 2164 respectively although her bilirubin is still within normal limits at 0 69  BUN and creatinine were also elevated at 51 and 2 48  The patient's niece was able to provide other aspects of history; she does drink sometimes although not every day and in fact she still has a half bottle of wine in the house  However she takes Tylenol p m  "too much and family members were forewarned regarding her taking too much of such medications  Patient denies taking too much of Tylenol p m  Her acetaminophen level is less than 0 2  Although she denies chest pain and denying any trauma (but being found quite unresponsive) her troponin is 11 0 and when recheck was 10 2   (will check CPK )    Currently, patient is alert and awake and knows she is in a hospital   I just recently took the BiPAP off and she is able to maintain saturation at 96%  However will place patient on 2 L nasal cannula for now      Review of Systems:    Constitutional:  Denies fever or chills   Eyes:  Denies change in visual acuity   HENT:  Denies nasal congestion or sore throat   Respiratory:  Denies cough or shortness of breath   Cardiovascular:  Denies chest pain or edema   GI:  Denies abdominal pain, nausea, vomiting, bloody stools or diarrhea   :  Denies dysuria   Musculoskeletal:  Denies back pain or joint pain   Integument:  Denies rash   Neurologic: Denies headache, focal weakness or sensory changes   Endocrine:  Denies polyuria or polydipsia   Lymphatic:  Denies swollen glands   Psychiatric:  Denies depression or anxiety     Past Medical and Surgical History:   Past Medical History:   Diagnosis Date    Disease of thyroid gland     Hyperlipemia     Hypertension      History reviewed  No pertinent surgical history  Meds/Allergies:    (Not in a hospital admission)  amLODIPine (NORVASC) 5 mg tablet Take 5 mg by mouth daily Chencho Hannah MD Reordered   Ordered as: amLODIPine (NORVASC) tablet 5 mg - 5 mg, Oral, Daily, First dose on Fri 6/26/20 at [facility] for systolic blood pressure less than (mmHg): 110   atorvastatin (LIPITOR) 10 mg tablet Take 10 mg by mouth daily Chencho Hannah MD Not Ordered   ciprofloxacin (CIPRO) 750 mg tablet Take 750 mg by mouth every 24 hours Chencho Hannah MD Not Ordered   levothyroxine 125 mcg tablet Take 125 mcg by mouth daily Chencho Hannah MD Reordered   Ordered as: levothyroxine tablet 125 mcg - 125 mcg, Oral, Daily, First dose on Fri 6/26/20 at 0900 Administer in the morning on an empty stomach, at least 30 to 60 minutes before food  Tablets may be crushed and suspended in 5-10mls of water for administration  LOOK ALIKE SOUND ALIKE MED  metoprolol tartrate (LOPRESSOR) 100 mg tablet Take 100 mg by mouth every 12 (twelve) hours Chencho Hannah MD Reordered   Ordered as: metoprolol tartrate (LOPRESSOR) tablet 100 mg - 100 mg, Oral, Every 12 hours scheduled, First dose on Thu 6/25/20 at 2200 Hold for heart rate less than 50 beats per minute  L[employer] ALIKE [employer] ALIKE MED Hold for systolic blood pressure less than (mmHg): 110   traZODone (DESYREL) 100 mg tablet Take 200 mg by mouth daily at bedtime Chencho Hannah MD        Allergies:    Allergies   Allergen Reactions    Asa [Aspirin]     Penicillins      History:  Marital Status:    Occupation: retired nurse  Patient Pre-hospital Living Situation: home with niece - cannot live in her home  Patient Pre-hospital Level of Mobility: ambulatory  Patient Pre-hospital Diet Restrictions: cardiac  Substance Use History:   Social History     Substance and Sexual Activity   Alcohol Use Not Currently     Social History     Tobacco Use   Smoking Status Never Smoker   Smokeless Tobacco Never Used     Social History     Substance and Sexual Activity   Drug Use Never       Family History:  History reviewed  No pertinent family history  Physical Exam:     Vitals:   Blood Pressure: 158/75 (06/25/20 2200)  Pulse: 92 (06/25/20 2200)  Temperature: 99 1 °F (37 3 °C) (06/25/20 1541)  Temp Source: Tympanic (06/25/20 1541)  Respirations: 15 (06/25/20 2200)  Weight - Scale: 78 3 kg (172 lb 9 9 oz) (06/25/20 1707)  SpO2: 97 % (06/25/20 2200)    Constitutional:  Well developed, well nourished, no acute distress, non-toxic appearance; sallow appearing  Dental caries noted  Dry teeth  Eyes:  PERRL, conjunctiva normal   HENT:  Atraumatic, external ears normal, nose normal, oropharynx dry, no pharyngeal exudates  Neck- normal range of motion, no tenderness, supple   Respiratory:  No respiratory distress, normal breath sounds, no rales, no wheezing   Cardiovascular:  Normal rate, normal rhythm, no murmurs, no gallops, no rubs   GI:  Soft, nondistended, normal bowel sounds, nontender, no organomegaly, no mass, no rebound, no guarding patient claims no pain whatsoever  :  No costovertebral angle tenderness   Musculoskeletal:  No edema, no tenderness, no deformities  Back- no tenderness  Integument:  Dry, no rash   Lymphatic:  No lymphadenopathy noted   Neurologic:  Alert &awake, communicative, CN 2-12 normal, normal motor function, normal sensory function, no focal deficits noted   Psychiatric:  Speech and behavior appropriate       Lab Results: I have personally reviewed pertinent reports        Results from last 7 days   Lab Units 06/25/20  1532   WBC Thousand/uL 23 86*   HEMOGLOBIN g/dL 12 6   HEMATOCRIT % 39 2   PLATELETS Thousands/uL 290   NEUTROS PCT % 85*   LYMPHS PCT % 8*   MONOS PCT % 6   EOS PCT % 0     Results from last 7 days   Lab Units 06/25/20  1532   POTASSIUM mmol/L 3 5   CHLORIDE mmol/L 101   CO2 mmol/L 24   BUN mg/dL 51*   CREATININE mg/dL 2 48*   CALCIUM mg/dL 8 1*   ALK PHOS U/L 111   ALT U/L 2,164*   AST U/L 3,174*     Results from last 7 days   Lab Units 06/25/20  1532   INR  1 53*         Imaging: I have personally reviewed pertinent reports  Ct Chest Abdomen Pelvis Wo Contrast    Result Date: 6/25/2020  Narrative: CT CHEST, ABDOMEN AND PELVIS WITHOUT IV CONTRAST INDICATION:   found down, hypoxic  COMPARISON:  None  TECHNIQUE: CT examination of the chest, abdomen and pelvis was performed without intravenous contrast   Axial, sagittal, and coronal 2D reformatted images were created from the source data and submitted for interpretation  Radiation dose length product (DLP) for this visit:  1097 17 mGy-cm   This examination, like all CT scans performed in the [facility], was performed utilizing techniques to minimize radiation dose exposure, including the use of iterative reconstruction and automated exposure control  Enteric contrast was administered  FINDINGS: CHEST LUNGS:  Bilateral centrally oriented alveolar opacities are seen likely representing pulmonary edema although pneumonia is not entirely excluded  Elevated right hemidiaphragm is noted  Associated right lung base atelectasis is present  There is no tracheal or endobronchial lesion  PLEURA:  Small bilateral pleural effusions are present  HEART/GREAT VESSELS:  Unremarkable for patient's age  MEDIASTINUM AND SINA:  Unremarkable  CHEST WALL AND LOWER NECK:   Unremarkable  ABDOMEN LIVER/BILIARY TREE:  Unremarkable  GALLBLADDER:  No calcified gallstones  No pericholecystic inflammatory change  SPLEEN:  Unremarkable  PANCREAS:  Unremarkable  ADRENAL GLANDS:  Unremarkable  KIDNEYS/URETERS:  5 mm nonobstructing right renal pelvis calculus is identified  Left kidney is unremarkable  STOMACH AND BOWEL:  Large volume of rectal stool is present  APPENDIX:  No findings to suggest appendicitis  ABDOMINOPELVIC CAVITY:  No ascites  No pneumoperitoneum  No lymphadenopathy  VESSELS:  Unremarkable for patient's age  PELVIS REPRODUCTIVE ORGANS:  Surgical changes of prior hysterectomy  URINARY BLADDER:  Unremarkable  ABDOMINAL WALL/INGUINAL REGIONS:  Unremarkable  OSSEOUS STRUCTURES:  No acute fracture or destructive osseous lesion  Impression: Bilateral centrally oriented alveolar opacities likely representing pulmonary edema though pneumonia is not entirely excluded  Small bilateral pleural effusions  Elevated right hemidiaphragm with associated right lung base atelectasis  No acute intra-abdominal abnormality  Nonobstructing right renal pelvis calculus  Workstation performed: [id number]     Ct Head Wo Contrast    Result Date: 6/25/2020  Narrative: CT BRAIN - WITHOUT CONTRAST INDICATION:   ams  COMPARISON:  None  TECHNIQUE:  CT examination of the brain was performed  In addition to axial images, coronal 2D reformatted images were created and submitted for interpretation  Radiation dose length product (DLP) for this visit:  862 05 mGy-cm   This examination, like all CT scans performed in the [facility], was performed utilizing techniques to minimize radiation dose exposure, including the use of iterative  reconstruction and automated exposure control  IMAGE QUALITY:  Diagnostic  FINDINGS: PARENCHYMA:  No intracranial mass, mass effect or midline shift  No CT signs of acute infarction  No acute parenchymal hemorrhage  VENTRICLES AND EXTRA-AXIAL SPACES:  Normal for the patient's age  VISUALIZED ORBITS AND PARANASAL SINUSES:  Unremarkable  CALVARIUM AND EXTRACRANIAL SOFT TISSUES:  Normal      Impression: No acute intracranial abnormality   Workstation performed: [id number] ** Please Note: Dragon 360 Dictation voice to text software was used in the creation of this document   **

## 2020-06-26 NOTE — ASSESSMENT & PLAN NOTE
- Continue biPAP support, can attempt to wean once volume status is addressed  - Optimize volume status  - Consider neb treatments  - Aggressive pulm toilet  - Critical Care to follow as [facility] consult

## 2020-06-26 NOTE — ASSESSMENT & PLAN NOTE
· Elevated INR 1 53 on admission and 1 46 today,  most likely secondary to coagulopathy due to current elevated transaminases/hepatic failure

## 2020-06-26 NOTE — PLAN OF CARE
Problem: Potential for Falls  Goal: Patient will remain free of falls  Description  INTERVENTIONS:  - Assess patient frequently for physical needs  -  Identify cognitive and physical deficits and behaviors that affect risk of falls    -  [place] fall precautions as indicated by assessment   - Educate patient/family on patient safety including physical limitations  - Instruct patient to call for assistance with activity based on assessment  - Modify environment to reduce risk of injury  - Consider OT/PT consult to assist with strengthening/mobility  6/26/2020 1000 by Burt Heredia RN  Outcome: Progressing  6/26/2020 0958 by Burt Heredia RN  Outcome: Progressing     Problem: Prexisting or High Potential for Compromised Skin Integrity  Goal: Skin integrity is maintained or improved  Description  INTERVENTIONS:  - Identify patients at risk for skin breakdown  - Assess and monitor skin integrity  - Assess and monitor nutrition and hydration status  - Monitor labs   - Assess for incontinence   - Turn and reposition patient  - Assist with mobility/ambulation  - Relieve pressure over bony prominences  - Avoid friction and shearing  - Provide appropriate hygiene as needed including keeping skin clean and dry  - Evaluate need for skin moisturizer/barrier cream  - Collaborate with interdisciplinary team   - Patient/family teaching  - Consider wound care consult   6/26/2020 1000 by Burt Heredia RN  Outcome: Progressing  6/26/2020 0958 by Burt Heredia RN  Outcome: Progressing     Problem: SAFETY ADULT  Goal: Maintain or return to baseline ADL function  Description  INTERVENTIONS:  -  Assess patient's ability to carry out ADLs; assess patient's baseline for ADL function and identify physical deficits which impact ability to perform ADLs (bathing, care of mouth/teeth, toileting, grooming, dressing, etc )  - Assess/evaluate cause of self-care deficits   - Assess range of motion  - Assess patient's mobility; develop plan if impaired  - Assess patient's need for assistive devices and provide as appropriate  - Encourage maximum independence but intervene and supervise when necessary  - Involve family in performance of ADLs  - Assess for home care needs following discharge   - Consider OT consult to assist with ADL evaluation and planning for discharge  - Provide patient education as appropriate  6/26/2020 1000 by Manjit Santacruz RN  Outcome: Progressing  6/26/2020 0958 by Manjit Santacruz RN  Outcome: Progressing  Goal: Maintain or return mobility status to optimal level  Description  INTERVENTIONS:  - Assess patient's baseline mobility status (ambulation, transfers, stairs, etc )    - Identify cognitive and physical deficits and behaviors that affect mobility  - Identify mobility aids required to assist with transfers and/or ambulation (gait belt, sit-to-stand, lift, walker, cane, etc )  - [place] fall precautions as indicated by assessment  - Record patient progress and toleration of activity level on Mobility SBAR; progress patient to next Phase/Stage  - Instruct patient to call for assistance with activity based on assessment  - Consider rehabilitation consult to assist with strengthening/weightbearing, etc   6/26/2020 1000 by Manjit Santacruz RN  Outcome: Progressing  6/26/2020 0958 by Manjit Santacruz RN  Outcome: Progressing     Problem: DISCHARGE PLANNING  Goal: Discharge to home or other facility with appropriate resources  Description  INTERVENTIONS:  - Identify barriers to discharge w/patient and caregiver  - Arrange for needed discharge resources and transportation as appropriate  - Identify discharge learning needs (meds, wound care, etc )  - Arrange for interpretive services to assist at discharge as needed  - Refer to Case Management Department for coordinating discharge planning if the patient needs post-hospital services based on physician/advanced practitioner order or complex needs related to functional status, cognitive ability, or social support system  6/26/2020 1000 by Mary Ann Mixon RN  Outcome: Progressing  6/26/2020 0958 by Mary Ann Mixon RN  Outcome: Progressing     Problem: CARDIOVASCULAR - ADULT  Goal: Maintains optimal cardiac output and hemodynamic stability  Description  INTERVENTIONS:  - Monitor I/O, vital signs and rhythm  - Monitor for S/S and trends of decreased cardiac output  - Administer and titrate ordered vasoactive medications to optimize hemodynamic stability  - Assess quality of pulses, skin color and temperature  - Assess for signs of decreased coronary artery perfusion  - Instruct patient to report change in severity of symptoms  6/26/2020 1000 by Mary Ann Mixon RN  Outcome: Progressing  6/26/2020 0958 by Mary Ann Mixon RN  Outcome: Progressing     Problem: METABOLIC, FLUID AND ELECTROLYTES - ADULT  Goal: Electrolytes maintained within normal limits  Description  INTERVENTIONS:  - Monitor labs and assess patient for signs and symptoms of electrolyte imbalances  - Administer electrolyte replacement as ordered  - Monitor response to electrolyte replacements, including repeat lab results as appropriate  - Instruct patient on fluid and nutrition as appropriate  6/26/2020 1000 by Mary Ann Mixon RN  Outcome: Progressing  6/26/2020 0958 by Mary Ann Mixon RN  Outcome: Progressing     Problem: SKIN/TISSUE INTEGRITY - ADULT  Goal: Skin integrity remains intact  Description  INTERVENTIONS  - Identify patients at risk for skin breakdown  - Assess and monitor skin integrity  - Assess and monitor nutrition and hydration status  - Monitor labs (i e  albumin)  - Assess for incontinence   - Turn and reposition patient  - Assist with mobility/ambulation  - Relieve pressure over bony prominences  - Avoid friction and shearing  - Provide appropriate hygiene as needed including keeping skin clean and dry  - Evaluate need for skin moisturizer/barrier cream  - Collaborate with interdisciplinary team (i e  Nutrition, Rehabilitation, etc )   - Patient/family teaching  6/26/2020 1000 by Mary Ann Mixon RN  Outcome: Progressing  6/26/2020 0958 by Mary Ann Mixon RN  Outcome: Progressing     Problem: MUSCULOSKELETAL - ADULT  Goal: Maintain or return mobility to safest level of function  Description  INTERVENTIONS:  - Assess patient's ability to carry out ADLs; assess patient's baseline for ADL function and identify physical deficits which impact ability to perform ADLs (bathing, care of mouth/teeth, toileting, grooming, dressing, etc )  - Assess/evaluate cause of self-care deficits   - Assess range of motion  - Assess patient's mobility  - Assess patient's need for assistive devices and provide as appropriate  - Encourage maximum independence but intervene and supervise when necessary  - Involve family in performance of ADLs  - Assess for home care needs following discharge   - Consider OT consult to assist with ADL evaluation and planning for discharge  - Provide patient education as appropriate  6/26/2020 1000 by Mary Ann Mixon RN  Outcome: Progressing  6/26/2020 0958 by Mary Ann Mixon RN  Outcome: Progressing  Goal: Maintain proper alignment of affected body part  Description  INTERVENTIONS:  - Support, maintain and protect limb and body alignment  - Provide patient/ family with appropriate education  6/26/2020 1000 by Mary Ann Mixon RN  Outcome: Progressing  6/26/2020 0958 by Mary Ann Mixon RN  Outcome: Progressing

## 2020-06-26 NOTE — ASSESSMENT & PLAN NOTE
· Patient was found unresponsive most likely secondary to hepatic encephalopathy in relation to chronic ingestion of Tylenol p m  with resultant elevation of LFTs currently in the 3000  At this point, will place patient on acetylcysteine despite acetaminophen level is less than 2     · Toxicology consult  · Per -  [facility]  at 11 pm   IF ALT and INR conitnue to trend down from 8p recheck, let      to check levels since he is on until 9 pm   · GI consult

## 2020-06-27 ENCOUNTER — APPOINTMENT (INPATIENT)
Dept: RADIOLOGY | Facility: HOSPITAL | Age: 75
DRG: 441 | End: 2020-06-27
Payer: MEDICARE

## 2020-06-27 PROBLEM — R74.8 ELEVATED CK: Status: ACTIVE | Noted: 2020-06-27

## 2020-06-27 PROBLEM — Z96.9 PRESENCE OF RETAINED HARDWARE: Status: ACTIVE | Noted: 2020-06-27

## 2020-06-27 PROBLEM — J18.9 PNEUMONIA: Status: ACTIVE | Noted: 2020-06-27

## 2020-06-27 PROBLEM — I42.9 CARDIOMYOPATHY (HCC): Status: ACTIVE | Noted: 2020-06-25

## 2020-06-27 PROBLEM — I10 HYPERTENSION: Status: ACTIVE | Noted: 2020-06-27

## 2020-06-27 PROBLEM — L97.509 FOOT ULCER (HCC): Status: ACTIVE | Noted: 2020-06-27

## 2020-06-27 PROBLEM — E03.9 HYPOTHYROIDISM: Status: ACTIVE | Noted: 2020-06-27

## 2020-06-27 LAB
ACTIN IGG SERPL-ACNC: 15 UNITS (ref 0–19)
ALBUMIN SERPL BCP-MCNC: 3 G/DL (ref 3.5–5)
ALP SERPL-CCNC: 93 U/L (ref 46–116)
ALT SERPL W P-5'-P-CCNC: 1096 U/L (ref 12–78)
ANION GAP SERPL CALCULATED.3IONS-SCNC: 5 MMOL/L (ref 4–13)
ANION GAP SERPL CALCULATED.3IONS-SCNC: 7 MMOL/L (ref 4–13)
AST SERPL W P-5'-P-CCNC: 604 U/L (ref 5–45)
BILIRUB DIRECT SERPL-MCNC: 0.37 MG/DL (ref 0–0.2)
BILIRUB SERPL-MCNC: 0.77 MG/DL (ref 0.2–1)
BUN SERPL-MCNC: 41 MG/DL (ref 5–25)
BUN SERPL-MCNC: 47 MG/DL (ref 5–25)
CALCIUM SERPL-MCNC: 8.2 MG/DL (ref 8.3–10.1)
CALCIUM SERPL-MCNC: 8.5 MG/DL (ref 8.3–10.1)
CERULOPLASMIN SERPL-MCNC: 38.7 MG/DL (ref 19–39)
CHLORIDE SERPL-SCNC: 101 MMOL/L (ref 100–108)
CHLORIDE SERPL-SCNC: 102 MMOL/L (ref 100–108)
CK MB SERPL-MCNC: 1.6 % (ref 0–2.5)
CK MB SERPL-MCNC: 4.4 NG/ML (ref 0–5)
CK SERPL-CCNC: 281 U/L (ref 26–192)
CMV IGG SERPL IA-ACNC: >10 U/ML (ref 0–0.59)
CMV IGM SERPL IA-ACNC: <30 AU/ML (ref 0–29.9)
CO2 SERPL-SCNC: 36 MMOL/L (ref 21–32)
CO2 SERPL-SCNC: 38 MMOL/L (ref 21–32)
CREAT SERPL-MCNC: 1.4 MG/DL (ref 0.6–1.3)
CREAT SERPL-MCNC: 1.5 MG/DL (ref 0.6–1.3)
EBV NA IGG SER IA-ACNC: 44.7 U/ML (ref 0–17.9)
EBV VCA IGG SER IA-ACNC: >600 U/ML (ref 0–17.9)
EBV VCA IGM SER IA-ACNC: <36 U/ML (ref 0–35.9)
ERYTHROCYTE [DISTWIDTH] IN BLOOD BY AUTOMATED COUNT: 13.5 % (ref 11.6–15.1)
GFR SERPL CREATININE-BSD FRML MDRD: 34 ML/MIN/1.73SQ M
GFR SERPL CREATININE-BSD FRML MDRD: 37 ML/MIN/1.73SQ M
GLUCOSE SERPL-MCNC: 116 MG/DL (ref 65–140)
GLUCOSE SERPL-MCNC: 171 MG/DL (ref 65–140)
HCT VFR BLD AUTO: 36.8 % (ref 34.8–46.1)
HGB BLD-MCNC: 11.9 G/DL (ref 11.5–15.4)
INR PPP: 1.21 (ref 0.84–1.19)
INTERPRETATION: ABNORMAL
L PNEUMO1 AG UR QL IA.RAPID: NEGATIVE
LKM-1 AB SER-ACNC: <20.1 UNITS (ref 0–20)
MAGNESIUM SERPL-MCNC: 1.7 MG/DL (ref 1.6–2.6)
MCH RBC QN AUTO: 32.1 PG (ref 26.8–34.3)
MCHC RBC AUTO-ENTMCNC: 32.3 G/DL (ref 31.4–37.4)
MCV RBC AUTO: 99 FL (ref 82–98)
PHOSPHATE SERPL-MCNC: 2.2 MG/DL (ref 2.3–4.1)
PLATELET # BLD AUTO: 268 THOUSANDS/UL (ref 149–390)
PMV BLD AUTO: 13.1 FL (ref 8.9–12.7)
POTASSIUM SERPL-SCNC: 2.4 MMOL/L (ref 3.5–5.3)
POTASSIUM SERPL-SCNC: 3.4 MMOL/L (ref 3.5–5.3)
PROT SERPL-MCNC: 7.1 G/DL (ref 6.4–8.2)
PROTHROMBIN TIME: 15.3 SECONDS (ref 11.6–14.5)
RBC # BLD AUTO: 3.71 MILLION/UL (ref 3.81–5.12)
S PNEUM AG UR QL: NEGATIVE
SODIUM SERPL-SCNC: 144 MMOL/L (ref 136–145)
SODIUM SERPL-SCNC: 145 MMOL/L (ref 136–145)
WBC # BLD AUTO: 16.86 THOUSAND/UL (ref 4.31–10.16)

## 2020-06-27 PROCEDURE — 85610 PROTHROMBIN TIME: CPT | Performed by: INTERNAL MEDICINE

## 2020-06-27 PROCEDURE — 76705 ECHO EXAM OF ABDOMEN: CPT

## 2020-06-27 PROCEDURE — 83735 ASSAY OF MAGNESIUM: CPT | Performed by: INTERNAL MEDICINE

## 2020-06-27 PROCEDURE — 99233 SBSQ HOSP IP/OBS HIGH 50: CPT | Performed by: INTERNAL MEDICINE

## 2020-06-27 PROCEDURE — 99222 1ST HOSP IP/OBS MODERATE 55: CPT | Performed by: EMERGENCY MEDICINE

## 2020-06-27 PROCEDURE — 80048 BASIC METABOLIC PNL TOTAL CA: CPT | Performed by: INTERNAL MEDICINE

## 2020-06-27 PROCEDURE — 80076 HEPATIC FUNCTION PANEL: CPT | Performed by: INTERNAL MEDICINE

## 2020-06-27 PROCEDURE — 94760 N-INVAS EAR/PLS OXIMETRY 1: CPT

## 2020-06-27 PROCEDURE — 84100 ASSAY OF PHOSPHORUS: CPT | Performed by: INTERNAL MEDICINE

## 2020-06-27 PROCEDURE — 82550 ASSAY OF CK (CPK): CPT | Performed by: INTERNAL MEDICINE

## 2020-06-27 PROCEDURE — 85027 COMPLETE CBC AUTOMATED: CPT | Performed by: INTERNAL MEDICINE

## 2020-06-27 PROCEDURE — 87449 NOS EACH ORGANISM AG IA: CPT | Performed by: INTERNAL MEDICINE

## 2020-06-27 PROCEDURE — 99232 SBSQ HOSP IP/OBS MODERATE 35: CPT | Performed by: INTERNAL MEDICINE

## 2020-06-27 PROCEDURE — 87081 CULTURE SCREEN ONLY: CPT | Performed by: INTERNAL MEDICINE

## 2020-06-27 PROCEDURE — 82553 CREATINE MB FRACTION: CPT | Performed by: INTERNAL MEDICINE

## 2020-06-27 RX ORDER — POTASSIUM CHLORIDE 14.9 MG/ML
20 INJECTION INTRAVENOUS
Status: COMPLETED | OUTPATIENT
Start: 2020-06-27 | End: 2020-06-27

## 2020-06-27 RX ORDER — CLOPIDOGREL BISULFATE 75 MG/1
75 TABLET ORAL DAILY
Status: DISCONTINUED | OUTPATIENT
Start: 2020-06-27 | End: 2020-07-03 | Stop reason: HOSPADM

## 2020-06-27 RX ORDER — MAGNESIUM SULFATE HEPTAHYDRATE 40 MG/ML
2 INJECTION, SOLUTION INTRAVENOUS ONCE
Status: COMPLETED | OUTPATIENT
Start: 2020-06-27 | End: 2020-06-27

## 2020-06-27 RX ORDER — POTASSIUM CHLORIDE 14.9 MG/ML
20 INJECTION INTRAVENOUS
Status: COMPLETED | OUTPATIENT
Start: 2020-06-27 | End: 2020-06-28

## 2020-06-27 RX ORDER — POTASSIUM CHLORIDE 20 MEQ/1
40 TABLET, EXTENDED RELEASE ORAL ONCE
Status: COMPLETED | OUTPATIENT
Start: 2020-06-27 | End: 2020-06-27

## 2020-06-27 RX ORDER — FUROSEMIDE 40 MG/1
40 TABLET ORAL
Status: DISCONTINUED | OUTPATIENT
Start: 2020-06-27 | End: 2020-06-28

## 2020-06-27 RX ORDER — METRONIDAZOLE 500 MG/1
500 TABLET ORAL EVERY 8 HOURS SCHEDULED
Status: DISCONTINUED | OUTPATIENT
Start: 2020-06-27 | End: 2020-07-01

## 2020-06-27 RX ORDER — POTASSIUM CHLORIDE 20 MEQ/1
20 TABLET, EXTENDED RELEASE ORAL ONCE
Status: COMPLETED | OUTPATIENT
Start: 2020-06-27 | End: 2020-06-27

## 2020-06-27 RX ADMIN — METRONIDAZOLE 500 MG: 500 TABLET ORAL at 14:46

## 2020-06-27 RX ADMIN — AMLODIPINE BESYLATE 5 MG: 5 TABLET ORAL at 09:08

## 2020-06-27 RX ADMIN — VANCOMYCIN HYDROCHLORIDE 1250 MG: 1 INJECTION, POWDER, LYOPHILIZED, FOR SOLUTION INTRAVENOUS at 14:33

## 2020-06-27 RX ADMIN — POTASSIUM CHLORIDE 40 MEQ: 1500 TABLET, EXTENDED RELEASE ORAL at 09:35

## 2020-06-27 RX ADMIN — PHYTONADIONE 5 MG: 10 INJECTION, EMULSION INTRAMUSCULAR; INTRAVENOUS; SUBCUTANEOUS at 09:34

## 2020-06-27 RX ADMIN — POTASSIUM CHLORIDE 20 MEQ: 14.9 INJECTION, SOLUTION INTRAVENOUS at 19:54

## 2020-06-27 RX ADMIN — CEFEPIME HYDROCHLORIDE 1000 MG: 1 INJECTION, POWDER, FOR SOLUTION INTRAMUSCULAR; INTRAVENOUS at 12:23

## 2020-06-27 RX ADMIN — CLOPIDOGREL BISULFATE 75 MG: 75 TABLET ORAL at 12:29

## 2020-06-27 RX ADMIN — MAGNESIUM SULFATE HEPTAHYDRATE 2 G: 40 INJECTION, SOLUTION INTRAVENOUS at 16:24

## 2020-06-27 RX ADMIN — POTASSIUM CHLORIDE 20 MEQ: 1500 TABLET, EXTENDED RELEASE ORAL at 11:12

## 2020-06-27 RX ADMIN — HEPARIN SODIUM 5000 UNITS: 5000 INJECTION INTRAVENOUS; SUBCUTANEOUS at 22:28

## 2020-06-27 RX ADMIN — POTASSIUM CHLORIDE 20 MEQ: 14.9 INJECTION, SOLUTION INTRAVENOUS at 12:24

## 2020-06-27 RX ADMIN — MELATONIN 3 MG: at 22:28

## 2020-06-27 RX ADMIN — POTASSIUM CHLORIDE 20 MEQ: 14.9 INJECTION, SOLUTION INTRAVENOUS at 09:34

## 2020-06-27 RX ADMIN — FUROSEMIDE 40 MG: 40 TABLET ORAL at 16:24

## 2020-06-27 RX ADMIN — METOPROLOL TARTRATE 100 MG: 50 TABLET, FILM COATED ORAL at 09:08

## 2020-06-27 RX ADMIN — HEPARIN SODIUM 5000 UNITS: 5000 INJECTION INTRAVENOUS; SUBCUTANEOUS at 14:28

## 2020-06-27 RX ADMIN — CEFEPIME HYDROCHLORIDE 1000 MG: 1 INJECTION, POWDER, FOR SOLUTION INTRAMUSCULAR; INTRAVENOUS at 22:31

## 2020-06-27 RX ADMIN — LEVOTHYROXINE SODIUM 125 MCG: 125 TABLET ORAL at 05:52

## 2020-06-27 RX ADMIN — POTASSIUM CHLORIDE 20 MEQ: 14.9 INJECTION, SOLUTION INTRAVENOUS at 22:51

## 2020-06-27 RX ADMIN — FUROSEMIDE 40 MG: 10 INJECTION, SOLUTION INTRAMUSCULAR; INTRAVENOUS at 11:00

## 2020-06-27 RX ADMIN — METOPROLOL TARTRATE 100 MG: 50 TABLET, FILM COATED ORAL at 22:28

## 2020-06-27 RX ADMIN — METRONIDAZOLE 500 MG: 500 TABLET ORAL at 22:28

## 2020-06-27 RX ADMIN — HEPARIN SODIUM 5000 UNITS: 5000 INJECTION INTRAVENOUS; SUBCUTANEOUS at 05:52

## 2020-06-27 NOTE — PROGRESS NOTES
Follow up Consultation    Nephrology   Jackelin Olmos 66 y o  female MRN: [id number]  Unit/Bed#: [facility] 724-01 Encounter: [id number]      Physician Requesting Consult: Nanda Hendrickson MD  Reason for Consult:  Acute kidney injury      1  ASSESSMENT/PLAN:  Acute kidney injury (POA):  · Suspected to be multifactorial - component of prerenal azotemia from fluid overload + possible heavy NSAID use + elevated CK  UA is not consistent with a GN or AIN but does have moderate blood with only 2-4 RBCs  No hydronephrosis on CT imaging  · At this time, the patient examines fluid overloaded and CT findings are consistent with vascular congestion  · Currently on Furosemide 40 mg IV BID  · In light of worsening alkalosis will decrease Lasix to 40 mg p o  B i d   · Trend creatinine and monitor I/O + avoid nephrotoxic medications       2  Respiratory failure:  · Suspected to be due to CHF + fluid overload  · Monitor with diuresis       3  Fluid overload:  · Unclear if has a history of CHF  Echo was been ordered  · Decrease Lasix to 40 mg p o  B i d   · Keep O>I       4  Hypokalemia:  · Will give KCl IV today  · Recheck BMP     5  Transaminitis:  · Suspected to be Tylenol overdose  · Discontinue acetylcysteine  · Follow-up with toxicology     6  Abnormal cardiac biomarkers  · Troponin 11 0 on admission  · Follow-up with cardiology  · Most recent echo from 06/26/2020 showed EF of 04% grade 2 diastolic dysfunction      7  Hypertension:  · BP is controlled  Monitor with diuresis  On Norvasc 5 mg p o  Q day, metoprolol 100 mg p o  B i d      8  Elevated CK:  · CK level of 1194 on admission  Now down to 281  · Likely to being down for an extended period of time  · No need for IVF since she is fluid overloaded  · Would simply promote urinary flow with diuretics       9  Encephalopathy  10  Pleural effusions  11   Leukocytosis:  On cefepime, Flagyl, vancomycin    Thanks for the consult  Will continue to follow  Please call with questions/ concerns  Above-mentioned orders and Plan in terms of acute kidney injury was discussed with the team in [address] Sepulved[address] MD, LINUSN, 2020, 1:03 PM              Objective :   Patient seen and examined in her room no overnight events hemodynamically stable remains afebrile urine output close to 3 3 L last 24 hours  Happy renal parameters are stable  Urine output significant  Edema improving  Unsure of accuracy of weights up bed scale  PHYSICAL EXAM  /64   Pulse 63   Temp (!) 97 2 °F (36 2 °C)   Resp 18   Ht 5' (1 524 m)   Wt 79 kg (174 lb 2 6 oz)   SpO2 95%   BMI 34 01 kg/m²   Temp (24hrs), Av 2 °F (36 2 °C), Min:97 °F (36 1 °C), Max:97 5 °F (36 4 °C)        Intake/Output Summary (Last 24 hours) at 2020 1303  Last data filed at 2020 1233  Gross per 24 hour   Intake 870 ml   Output 4260 ml   Net -3390 ml       I/O last 24 hours: In: 2113 8 [P O :720; I V :830; IV Piggyback:563 8]  Out: 4610 [Urine:4610]      Current Weight: Weight - Scale: 79 kg (174 lb 2 6 oz)  First Weight: Weight - Scale: 78 3 kg (172 lb 9 9 oz)  Physical Exam   Constitutional: She is oriented to person, place, and time  She appears well-developed and well-nourished  No distress  HENT:   Head: Normocephalic and atraumatic  Mouth/Throat: Oropharynx is clear and moist    Eyes: Conjunctivae are normal  No scleral icterus  Neck: Neck supple  No JVD present  Cardiovascular: Normal heart sounds  Exam reveals no friction rub  Pulmonary/Chest: Effort normal  She has no wheezes  She has no rales  Abdominal: Soft  She exhibits no mass  There is no tenderness  Musculoskeletal: She exhibits edema  Trace edema bilateral lower extremities   Neurological: She is alert and oriented to person, place, and time  Skin: Skin is warm  No rash noted  She is not diaphoretic  Psychiatric: She has a normal mood and affect   Her behavior is normal    Nursing note and vitals reviewed  Review of Systems   Constitutional: Negative for chills, fatigue and fever  HENT: Negative for congestion  Respiratory: Negative for cough, shortness of breath and wheezing  Cardiovascular: Positive for leg swelling  Gastrointestinal: Negative for abdominal pain, constipation, diarrhea, nausea and vomiting  Genitourinary: Negative for difficulty urinating and dysuria  Musculoskeletal: Negative for back pain  Neurological: Negative for dizziness and headaches  Psychiatric/Behavioral: Negative for agitation  All other systems reviewed and are negative  Scheduled Meds:  Current Facility-Administered Medications:  albuterol 2 5 mg Nebulization Q4H PRN Ko Gold MD    aluminum-magnesium hydroxide-simethicone 5 mL Oral Q6H PRN Antoni Fowler MD    amLODIPine 5 mg Oral Daily Antoni Fowler MD    cefepime 1,000 mg Intravenous Q12H Ko Gold MD Last Rate: 1,000 mg (06/27/20 1223)   clopidogrel 75 mg Oral Daily Will Cummings MD    furosemide 40 mg Intravenous BID (diuretic) Russell Ceballos MD    heparin (porcine) 5,000 Units Subcutaneous [facility] Antoni Fowler MD    levothyroxine 125 mcg Oral Early Morning Antoni Fowler MD    magnesium sulfate 2 g Intravenous Once Ko Gold MD    melatonin 3 mg Oral HS Atnoni Fowler MD    metoprolol tartrate 100 mg Oral Q12H Jyoti Stinson MD    metroNIDAZOLE 500 mg Oral [facility] Ko Gold MD    ondansetron 4 mg Intravenous Q6H PRN Antoni Fowler MD    phytonadione 5 mg Intravenous Daily Elia Machuca MD Last Rate: 5 mg (06/27/20 0934)   potassium chloride 20 mEq Intravenous Q2H Ko Gold MD Last Rate: 20 mEq (06/27/20 1224)   vancomycin 20 mg/kg (Adjusted) Intravenous Q24H Ko Gold MD        PRN Meds:   albuterol    aluminum-magnesium hydroxide-simethicone    ondansetron    Continuous Infusions:       Invasive Devices:      Invasive Devices     Peripheral Intravenous Line Peripheral IV 06/25/20 Left Antecubital 1 day    Peripheral IV 06/25/20 Right Forearm 1 day          Drain            External Urinary Catheter 1 day                  LABORATORY:    Results from last 7 days   Lab Units 06/27/20  0503 06/26/20 2023 06/26/20  0528 06/26/20  0152 06/25/20  1532   WBC Thousand/uL 16 86*  --  23 63*  --  23 86*   HEMOGLOBIN g/dL 11 9  --  11 5  --  12 6   HEMATOCRIT % 36 8  --  35 8  --  39 2   PLATELETS Thousands/uL 268  --  257 252 290   POTASSIUM mmol/L 2 4* 3 4* 2 8*  --  3 5   CHLORIDE mmol/L 102 103 104  --  101   CO2 mmol/L 36* 27 25  --  24   BUN mg/dL 47* 48* 52*  --  51*   CREATININE mg/dL 1 40* 1 63* 1 91*  --  2 48*   CALCIUM mg/dL 8 2* 8 0* 7 8*  --  8 1*   MAGNESIUM mg/dL 1 7  --  2 1  --   --    PHOSPHORUS mg/dL 2 2*  --  2 9  --   --       rest all reviewed    RADIOLOGY:  XR foot 3+ vw right   Final Result by Angélica Machado DO (06/27 7942)   1  Age-indeterminate distal 4th metatarsal fracture, likely subacute  2   Soft tissue swelling surrounding the 3rd and 5th toes, most compatible with cellulitis  There are underlying bony changes as described above  Superimposed osteomyelitis not excluded  If there is continued concern for osteomyelitis, consider    follow-up MRI of the foot with gadolinium or nuclear medicine white blood cell scan  The study was marked in [facility] for immediate notification  Workstation performed: [id number]         CT head wo contrast   Final Result by Jian Jorge MD (06/25 2026)      No acute intracranial abnormality  Workstation performed: [id number]         CT chest abdomen pelvis wo contrast   Final Result by Jian Jorge MD (06/25 2037)      Bilateral centrally oriented alveolar opacities likely representing pulmonary edema though pneumonia is not entirely excluded  Small bilateral pleural effusions  Elevated right hemidiaphragm with associated right lung base atelectasis        No acute intra-abdominal abnormality  Nonobstructing right renal pelvis calculus  Workstation performed: [id number]         US right upper quadrant with liver dopplers    (Results Pending)   VAS upper limb venous duplex scan, unilateral/limited    (Results Pending)   VAS lower limb arterial duplex, complete bilateral    (Results Pending)   NM radrx in111 abscess localization limited    (Results Pending)     Rest all reviewed    Portions of the record may have been created with voice recognition software  Occasional wrong word or "sound a like" substitutions may have occurred due to the inherent limitations of voice recognition software  Read the chart carefully and recognize, using context, where substitutions have occurred  If you have any questions, please contact the dictating provider

## 2020-06-27 NOTE — CONSULTS
Consultation - Medical Toxicology  Keshawn Chandra 66 y o  female MRN: [id number]  Unit/Bed#: [facility] 724-01 Encounter: [id number]        [address]  06/27/20    Reason for Consult / Principal Problem: acute liver injury   Inpatient consult to Toxicology  Consult performed by: Marcine Cowden, DO  Consult ordered by: Arturo Zaidi MD    ASSESSMENT:  66year-old female with acute hepatotoxicity associated with acetaminophen  1  Acute hepatotoxicity associated with acetaminophen exposure   2  Acute renal insufficiency associated with acetaminophen exposure  3  Encephalopathy  4  Hypokalemia  5  Elevated troponin     RECOMMENDATIONS:  Please continue supportive care  At this point, N-acetylcysteine is appropriately discontinued as transaminases have continually downtrended over two consecutive lab draws  The patient's laboratory pattern is consistent with repeat supratherapeutic ingestion of acetaminophen  This is also known as chronic acetaminophen toxicity  She admits to abusing Tylenol PM in an attempt to self-treat anxiety that is associated with difficulty sleeping  This is in addition to ingesting diphenhydramine and acetaminophen separately  Is likely that anticholinergic toxicity may have contributed to her encephalopathy that was associated with her initial presentation and complications  It also seems most consistent with that acetaminophen toxicity has induced the transient hepatotoxicity that is now improved  At this point, my only further recommendations would be to have Psychiatry see her to attempt to treat her anxiety and to consult case management to assure that she is safe at home as she lives alone, and does not seem to be able to effectively care for herself  Please continue routine medical evaluation for additional medical problems           INITIAL RECOMMENDATIONS PER INTERPROFESSIONAL PHONE CONSULTATION (6/26/2020):  Presentation is concerning for acetaminophen toxicity in the setting of delayed acute overdose versus repeat supratherapeutic ingestion  This would be the most likely drug-induced liver injury  In both of these settings, you can observe hepatotoxicity, acute renal injury, elevated troponin, elevated INR, hypokalemia and undetectable acetaminophen concentration  Please continue NAC for full treatment course and obtain a CMP and INR at 8pm to assure ALT continues to downtrend and INR remains less than 2  Given the current pattern of transaminitis, I suspect the NAC may be discontinued at 11pm as scheduled; however, if ALT rising or INR > 2, please continue NAC at 6 25 mg/kg/hr for additional 16 hours  Please repeat a venous blood gas now to assure patient is not meeting Jimenez's [place] Criteria as her pH was 7 27 last night  Given that the patient's presentation is also multifactorial, with the possibility of her pulmonary infiltrates and other processes being parallel or consequential processes (ie aspiration while encephalopathic), please continue further medical management and supportive care  If overdose has occurred, her CNS depression and hypoxia would have more likely been the result of a coingestion or additional process such as CHF or pneumonia  For further questions, please call [facility]  Service or Patient Access Center to reach the medical  on call  Please see additional teaching note below (if available)          Medical Toxicology Teaching Note  [facility]  Acetaminophen Toxicity  Last revised October 2017     Acetaminophen (Tylenol) is a nonopiod analgesic and antipyretic medication found in many over-the-counter and prescription products such as Tylenol PM, Norco, Percocet, Nyquil, Vicks Formula 44-D  The recommended maximum daily dose of acetaminophen for adults is 3g/day, and 75-90mg/kg/day for children   Alcoholics may safely take Tylenol in therapeutic doses, but they may be at increased risk for hepatotoxicity in overdose  Mechanism of Toxicity: Acetaminophen is primarily metabolized by the liver  In therapeutic doses, about 90% of acetaminophen is conjugated to nontoxic metabolites (glucoronides and sulfates)  A small portion (<5%) is conjugated by cytochrome P450 enzyme, subunit CYP2E1, to a toxic metabolite, N-acetyl-p-benzoquinoneimine (NAPQI)  This metabolite is further conjugated by glutathione, to nontoxic metabolites eliminated by the kidneys  Liver Injury:  In toxic doses, the usual metabolic pathways are overwhelmed; acetaminophen is shunted to the cytochrome P450 pathway, creating NAPQI  Glutathione stores are depleted and NAPQI is produced  Cellular injury and hepatic necrosis may occur as NAPQI accumulates  Renal Injury:  Cytochrome P450 activity in the kidneys is thought to cause direct renal damage  Renal insufficiency may also develop during fulminant hepatic failure due to hepatorenal syndrome  Renal toxicity is usually associated with liver injury  Pharmacokinetics:  Acetaminophen is rapidly absorbed  Peak levels occur within  minutes with normal doses  Delayed absorption may occur with sustained release products or with co-ingestions that slow the GI tract (opiods, anticholinergics)  The elimination half-life is 1-3 hours after therapeutic doses and may extend to 12 hours after overdose  Toxic Dose:  Toxicity in adults may occur with acute ingestions of 7g, and 200mg/kg in children  Hepatic injury following chronic ingestions may occur at any dose above the daily recommended dose  Clinical Presentation:    Acute Ingestion: Within 8 hrs of an acute ingestion, there are usually few symptoms  Between 8-30 hours after a toxic, acute ingestion, a transaminitis will develop  Nausea, vomiting, and right upper quadrant pain may occur  Within 12-36 hours, worsening AST/ALT develops with elevated bilirubin and INR   The most severe cases will develop fulminant liver failure with hepatic encephalopathy and acidosis, usually within 3-7 days post overdose  The patient should be evaluated for a liver transplantation  Repeated Supra-therapeutic Ingestion: Due to a sub-acute course, patients may present anywhere along a spectrum  normal LFTS to asymptomatic elevation of enzymes to hepatic failure  Diagnosis   Acute Ingestion (Time of Ingestion Known): After an acute ingestion at a known time, obtain a 4-hour post-ingestion serum acetaminophen level and plot the level on the Yan-Juliuss nomogram (see below)  This nomogram is used to predict the likelihood of hepatic toxicity based on the level of acetaminophen between 4 and 24 hours post-ingestion  The nomogram CANNOT be used if the time of ingestion is unknown  The dotted line (Rumack-Julius line), marking a 4-hour level at 200 mcg/ml, is the original line developed from the study above which hepatic toxicity will probably occur  The solid line (Treatment Line), marking a 4-hour level at 150ug/ml  is the treatment line accepted as the standard of care in the [place] and is 25% lower as a safety margin  If the patients serum APAP level falls above the treatment line, start treatment with N-acetylcysteine (NAC)  (see Treatment below)           Acute Ingestion (Time of Ingestion Unknown) or Repeated Supra-therapeutic Ingestion An acetaminophen level CANNOT be plotted on the Rumack-Juliuss nomogram  Draw an APAP level and AST/ALT at time of presentation  Anyone with an APAP level> 10mcg/ml OR elevated AST/ALT should start NAC  (see Treatment below)     TREATMENT   Emergency and Supportive Care: Treat nausea and vomiting to protect airway and support safe administration of charcoal and NAC, when indicated (see below)  Provide standard supportive care for liver and renal failure  Contact liver transplant team if fulminant hepatic failure occurs     Decontamination:  Administer activated charcoal within 2 hours of ingestion (consider later if extended release preparations)  Use antiemetics for nausea  Activated charcoal does bind to NAC, but the effect is not thought to be clinically significant  Gastric emptying is not recommended  Specific Drugs and Antidotes  Acute Ingestion Treat with NAC if the APAP level falls above the Treatment Line on the nomogram  The maximal benefit occurs if given within 8 hours of acute ingestion  Therefore, it is recommended to empirically start NAC before a level is obtained if there is a reasonable concern of a toxic ingestion presenting close to 8 hours or beyond  In late presenters (>8hrs), start NAC and treat for a full course or longer if LFTS remain abnormal  Treatment maybe stopped when AST/ALT peak and then downtrend, with an INR <2 and patient is clinically well  If abnormal labs persist, continue NAC and call Toxicology  There are two routes of administration for NAC, oral and IV  The treatment protocols are described below  Acute Ingestion (Time of Ingestion Unknown) or Repeated Supra-therapeutic Ingestion   The nomogram CANNOT be used to estimate the risk of hepatotoxicity  At presentation, check a serum APAP level and AST/ALT  If the APAP level is above 10 mcg/ml or the AST/ALT are elevated, start NAC treatment for 12 hours  If abnormalities persist, continue NAC treatment and call toxicology  If the APAP level is undetectable and AST and ALT are downtrending at the end of 12 hours, treatment may be stopped  Intravenous (Acetadote)   Loading dose- 150mg/kg infused over 15-60 minutes   Maintenance Infusion #1- 50mg/kg (12 5mg/kg/hr) over 4 hours   Maintenance Infusion #2 -100mg/kg (6 25 mg/kg/hr) until treatment endpoint   Treatment Endpoint: 20 hours or more   NAC should be continued for the full course  NAC can be stopped when APAP is undetectable, AST/ALT have peaked and are downtrending, and patient appears clinically well   Consultation with a medical  [facility] is recommended before changes in the duration of therapy are made  Acetaminophen Toxicity Dos and Donts   Acute Ingestions   DO give charcoal for decontamination within 2 hours of ingestion if the patient can adequately protect their airway  DO start NAC empirically, i e  without an APAP level, if the ingestion is likely a large overdose presenting at 8 hours or more after ingestion  DO contact the Liver Transplant Team early if liver failure is developing  DO NOT get a level before 4hrs post-ingestion if the time of ingestion is certain in an acute overdose  DO NOT stop NAC therapy until full course is finished or truncated therapy is recommended by the [facility]  Repeated Supra-Therapeutic Ingestions (RSI)   DO ask patients with pain complaints (toothaches, back pain, cancer) about the amount of acetaminophen they use  DO NOT use the Yan-Montez nomogram to determine if the APAP level is toxic  DO NOT stop NAC therapy until full course is finished or truncated therapy is recommended by the [facility]  NAC Protocols   DO stop IV NAC if an anaphylactoid reaction occurs (rare)  Treat the reaction appropriately and call the [facility] for recommendations on continued NAC therapy  DO give charcoal with oral NAC when charcoal is indicated  References   Elina AQUINO Acetaminophen  In [facility] [place] EM, [address] et al Dorian Ramos  Medical Toxicology 3rd edition  [place]: [address] [place] [address], 2004: pp [phone]  [facility] [place] Acetaminophen  In [facility] [place]         Hx and PE limited by:  Mild confusion    HPI: Ronald Winchester is a 66y o  year old female who presented with decreased mental status, reported cyanosis, and acute hepatotoxicity  Patient reports that she was abusing diphenhydramine to treat her insomnia and anxiety and that she would use Tylenol PM interchangeably  She had no abdominal pain or jaundice  No N/D  When I evaluated her she was doing well and tolerating PO  She received the recommended course of NAC and ALT has downtrended with INR less than 2  She denied intentional overdose  Review of Systems   Constitutional: Negative  HENT: Negative  Respiratory: Negative  Cardiovascular: Negative  Gastrointestinal: Negative  Genitourinary: Negative  Musculoskeletal: Negative  Neurological: Negative  Psychiatric/Behavioral: Positive for decreased concentration  Negative for suicidal ideas  The patient is nervous/anxious  Historical Information   Past Medical History:   Diagnosis Date    Disease of thyroid gland     Hyperlipemia     Hypertension      History reviewed  No pertinent surgical history  Social History   Social History     Substance and Sexual Activity   Alcohol Use Not Currently     Social History     Substance and Sexual Activity   Drug Use Never     Social History     Tobacco Use   Smoking Status Never Smoker   Smokeless Tobacco Never Used     History reviewed  No pertinent family history  Prior to Admission medications    Medication Sig Start Date End Date Taking?  Authorizing Provider   amLODIPine (NORVASC) 5 mg tablet Take 5 mg by mouth daily   Yes Historical Provider, MD   atorvastatin (LIPITOR) 10 mg tablet Take 10 mg by mouth daily   Yes Historical Provider, MD   ciprofloxacin (CIPRO) 750 mg tablet Take 750 mg by mouth every 24 hours   Yes Historical Provider, MD   levothyroxine 125 mcg tablet Take 125 mcg by mouth daily   Yes Historical Provider, MD   metoprolol tartrate (LOPRESSOR) 100 mg tablet Take 100 mg by mouth every 12 (twelve) hours   Yes Historical Provider, MD   traZODone (DESYREL) 100 mg tablet Take 200 mg by mouth daily at bedtime   Yes Historical Provider, MD       Current Facility-Administered Medications   Medication Dose Route Frequency    albuterol inhalation solution 2 5 mg  2 5 mg Nebulization Q4H PRN    aluminum-magnesium hydroxide-simethicone (MYLANTA) 200-200-20 mg/5 mL oral suspension 5 mL  5 mL Oral Q6H PRN    amLODIPine (NORVASC) tablet 5 mg  5 mg Oral Daily    cefepime (MAXIPIME) 1,000 mg in dextrose 5 % 50 mL IVPB  1,000 mg Intravenous Q12H    furosemide (LASIX) injection 40 mg  40 mg Intravenous BID (diuretic)    heparin (porcine) subcutaneous injection 5,000 Units  5,000 Units Subcutaneous Q8H [facility]    levothyroxine tablet 125 mcg  125 mcg Oral Early Morning    magnesium sulfate 2 g/50 mL IVPB (premix) 2 g  2 g Intravenous Once    melatonin tablet 3 mg  3 mg Oral HS    metoprolol tartrate (LOPRESSOR) tablet 100 mg  100 mg Oral Q12H DANNY    metroNIDAZOLE (FLAGYL) tablet 500 mg  500 mg Oral Q8H [facility]    ondansetron (ZOFRAN) injection 4 mg  4 mg Intravenous Q6H PRN    phytonadione (AQUA-MEPHYTON) 10 mg/mL 5 mg in sodium chloride 0 9 % 50 mL IVPB  5 mg Intravenous Daily    potassium chloride (K-DUR,KLOR-CON) CR tablet 20 mEq  20 mEq Oral Once    potassium chloride 20 mEq IVPB (premix)  20 mEq Intravenous Q2H    vancomycin (VANCOCIN) IVPB (premix) 750 mg 150 mL  10 mg/kg Intravenous Q24H       Allergies   Allergen Reactions    Asa [Aspirin]     Penicillins Other (See Comments)     No reaction listed; however, tolerates cefepime which has a different side chain       Objective       Intake/Output Summary (Last 24 hours) at 6/27/2020 1102  Last data filed at 6/27/2020 1101  Gross per 24 hour   Intake 1183 83 ml   Output 3710 ml   Net -2526 17 ml       Invasive Devices:   Peripheral IV 06/25/20 Left Antecubital (Active)   Site Assessment Clean;Dry; Intact 6/26/2020  8:00 PM   Dressing Type Transparent 6/26/2020  8:00 PM   Line Status Flushed;Saline locked 6/26/2020  8:00 PM   Dressing Status Clean;Dry; Intact 6/26/2020  8:00 PM   Dressing Change Due 06/29/20 6/26/2020 12:13 AM       Peripheral IV 06/25/20 Right Forearm (Active)   Site Assessment Clean;Dry; Intact 6/26/2020  8:00 PM   Dressing Type Transparent 6/26/2020  8:00 PM   Line Status Flushed;Saline locked 6/26/2020  8:00 PM   Dressing Status Clean;Dry; Intact 6/26/2020  8:00 PM   Dressing Change Due 06/29/20 6/26/2020 12:13 AM       External Urinary Catheter (Active)   Collection Container Canister and suction tubing (For Female) 6/26/2020  7:29 AM   Suction Pressure (mmHg) 90 mmHg 6/26/2020  7:29 AM   Interventions Removed and skin assessed; Device changed;Suction canister changed 6/26/2020  4:01 PM   Output (mL) 1100 mL 6/27/2020  5:50 AM       Vitals   Vitals:    06/26/20 1901 06/26/20 2127 06/26/20 2127 06/27/20 0721   BP: 123/64 133/64 133/62 136/62   TempSrc:       Pulse: 73 72 72 72   Resp: 20   18   Patient Position - Orthostatic VS:       Temp: (!) 97 °F (36 1 °C)   (!) 97 2 °F (36 2 °C)       Physical Exam   Constitutional: She is oriented to person, place, and time  She appears well-developed and well-nourished  HENT:   Head: Normocephalic and atraumatic  Eyes: Pupils are equal, round, and reactive to light  Conjunctivae and EOM are normal    Neck: Normal range of motion  Cardiovascular: Normal rate  Pulmonary/Chest: Effort normal    Abdominal: Soft  There is no tenderness  Musculoskeletal: Normal range of motion  Neurological: She is alert and oriented to person, place, and time  She displays no tremor  She exhibits normal muscle tone  Patient is alert to person and place  It is difficult to determine if she is oriented to time as she uses the word in the room to related date  However, she is able to name the president  Skin: Skin is warm and dry  Psychiatric: Her speech is normal  Her mood appears anxious  She is slowed  She is not actively hallucinating  She expresses impulsivity and inappropriate judgment  She expresses no suicidal ideation  She expresses no suicidal plans  She exhibits abnormal recent memory  She is inattentive  EKG, Pathology, and Other Studies: I have personally reviewed pertinent reports  Lab Results: I have personally reviewed pertinent reports        Labs:  Results from last 7 days   Lab Units 06/27/20  0503 06/26/20  0528   WBC Thousand/uL 16 86* 23 63*   HEMOGLOBIN g/dL 11 9 11 5   HEMATOCRIT % 36 8 35 8   PLATELETS Thousands/uL 268 257   NEUTROS PCT %  --  84*   LYMPHS PCT %  --  7*   MONOS PCT %  --  8      Results from last 7 days   Lab Units 06/27/20  0503  06/26/20  0528   SODIUM mmol/L 145   < > 141   POTASSIUM mmol/L 2 4*   < > 2 8*   CHLORIDE mmol/L 102   < > 104   CO2 mmol/L 36*   < > 25   BUN mg/dL 47*   < > 52*   CREATININE mg/dL 1 40*   < > 1 91*   CALCIUM mg/dL 8 2*   < > 7 8*   ALK PHOS U/L 93   < > 89   ALT U/L 1,096*   < > 1,473*   AST U/L 604*   < > 1,293*   MAGNESIUM mg/dL 1 7  --  2 1   PHOSPHORUS mg/dL 2 2*  --  2 9    < > = values in this interval not displayed  Results from last 7 days   Lab Units 06/26/20  2130 06/26/20  1113 06/26/20  0152   INR  1 33* 1 46* 1 61*   PTT seconds  --   --  40*     Results from last 7 days   Lab Units 06/25/20  1844 06/25/20  1649   LACTIC ACID mmol/L 1 9 3 8*     0   Lab Value Date/Time    TROPONINI 8 01 (H) 06/26/2020 0528    TROPONINI 8 02 (H) 06/26/2020 0152    TROPONINI 10 20 (H) 06/25/2020 1807    TROPONINI 11 00 (H) 06/25/2020 1532     Results from last 7 days   Lab Units 06/26/20  1302   PH LILLY  7 466*   PCO2 LILLY mm Hg 36 0*   PO2 LILLY mm Hg 197 5*   HCO3 LILLY mmol/L 25 4   O2 CONTENT LILLY ml/dL 18 1   O2 HGB, VENOUS % 96 6*     Results from last 7 days   Lab Units 06/25/20  1649   ACETAMINOPHEN LVL ug/mL <2*   ETHANOL LVL mg/dL <3   SALICYLATE LVL mg/dL <3*         Imaging Studies: I have personally reviewed pertinent reports          Minutes of critical care time 59  -Critical care time was exclusive of seperately billable procedures and teaching time    -Critical care was necessary to treat or prevent imminent or life-threatening deterioration of the following condition: cardiac failure, CNS failure/comprimise, dehydration, hepatic failure, metabolic crisis, renal failure, toxidrome   -Critical care time was spent personally by me on the following activities as well as the above as per the course and rest of chart: obtaining history from patient/surrogate, developement of a treatment plan, discussions with primary provider(s), evaluation of patient's response to the treatment, examination of the patient, recommending treatements and interventions, re-evaluation of the patient's condition, review of old charts, recommending/reviewing laboratory studies, recommending/reviewing of radiographic studies

## 2020-06-27 NOTE — CONSULTS
Podiatry - Consultation    Patient Information:   Jennifer Moeller 66 y o  female MRN: [id number]  [facility] Encounter: [id number]  PCP: No primary care provider on file  Date of Admission:  6/25/2020  Date of Consultation: 06/27/20  Requesting Physician: Jennifer Cerda MD      ASSESSMENT:    Jennifer Moeller is a 66 y o  female with:    1  Ulceration dorsal aspect right 3rd digit - probes to bone  2  Broken retained hardware (screw) right 2nd toe - asymptomatic  3  Acute hepatic encephalopathy    PLAN:    · Local wound care provided to ulceration of right 3rd digit (this Acticoat, DSD)  Scab was removed revealing ulceration which probed to bone  Possible amputation of right 3rd digit pending results of Ceretec scan  · X-rays of right foot were reviewed showing retained hardware of right foot  Cerclage wire noted in right 1st proximal phalanx, broken screw noted of right 2nd digit at the PIPJ, screw noted of 3rd digit with surrounding bony lucencies, retained screw fragment noted in the 4th proximal phalanx  · Given bony lucencies of 3rd digit on x-ray, in combination with clinical probe to bone, Ceretec scan ordered to rule out osteomyelitis of right 3rd digit  MRI was not obtained due to retained hardware high likelihood of artifact which could potentially compromise ability to assess for osteomyelitis  · Lower extremity arterial duplex studies were ordered to assess blood flow and healing potential   · Weight-bearing as tolerated  · Rest of Medical care per primary team   · This plan was discussed with attending Dr Hayden Suero  SUBJECTIVE:    History of Present Illness:    Jennifer Moeller is a 66 y o  female who is originally admitted 6/25/2020 due to acute hepatic encephalopathy  Patient has a past medical history of insomnia, depression, hyperlipidemia and benign essential hypertension  We are consulted for evaluation right foot wound and postsurgical changes    Patient is a poor historian and is admitted for encephalopathy  Patient however on examination appears coherent and is oriented to place and person  Patient reports that she underwent surgery on her right foot in [facility] time ago  She is unsure of the exact date  She reports most recently visiting with a podiatrist Dr Blaine Ramirez in the outpatient setting who was planning on removal right 2nd digit hardware  Patient reports no pain with right 2nd digit this time she does note that her right 2nd toe is crooked  Her chief complaint appears to be ulceration on the dorsal aspect of the right 3rd digit  She is unable to provide history of ulceration  She does not know how long she has had  When question she reports again that she has had the ulceration for a long time  She reports no drainage from the area and minimal pain  She has no further podiatric complaints at this time  Review of Systems:    Constitutional: Negative  HENT: Negative  Eyes: Negative  Respiratory: Negative  Cardiovascular: Negative  Gastrointestinal: Negative  Musculoskeletal:  As noted in HPI   Skin:  Ulceration right 3rd digit   Neurological:  Negative   Psych: Negative  Past Medical and Surgical History:     Past Medical History:   Diagnosis Date    Disease of thyroid gland     Hyperlipemia     Hypertension        History reviewed  No pertinent surgical history      Meds/Allergies:    Medications Prior to Admission   Medication    amLODIPine (NORVASC) 5 mg tablet    atorvastatin (LIPITOR) 10 mg tablet    ciprofloxacin (CIPRO) 750 mg tablet    levothyroxine 125 mcg tablet    metoprolol tartrate (LOPRESSOR) 100 mg tablet    traZODone (DESYREL) 100 mg tablet       Allergies   Allergen Reactions    Asa [Aspirin]     Penicillins Other (See Comments)     No reaction listed; however, tolerates cefepime which has a different side chain       Social History:     Marital Status:     Substance Use History:   Social History Substance and Sexual Activity   Alcohol Use Not Currently     Social History     Tobacco Use   Smoking Status Never Smoker   Smokeless Tobacco Never Used     Social History     Substance and Sexual Activity   Drug Use Never       Family History:    History reviewed  No pertinent family history  OBJECTIVE:    Vitals:   Blood Pressure: 117/64 (06/27/20 1128)  Pulse: 63 (06/27/20 1128)  Temperature: (!) 97 2 °F (36 2 °C) (06/27/20 0721)  Temp Source: Oral (06/26/20 0723)  Respirations: 18 (06/27/20 1128)  Height: 5' (152 4 cm) (06/25/20 1706)  Weight - Scale: 79 kg (174 lb 2 6 oz) (06/27/20 0550)  SpO2: 95 % (06/27/20 1128)    Physical Exam:    General Appearance: Alert, cooperative, no distress  HEENT: Head normocephalic, atraumatic, without obvious abnormality  Heart: Normal rate and rhythm  Lungs: Non-labored breathing  No respiratory distress  Abdomen: Without distension  Psychiatric: AAOx3  Lower Extremity:  Vascular:   Right DP and PT pulses are weak  Left DP and PT pulses are weak  CRT < 3 seconds at the digits  +0/4 edema noted at bilateral lower extremities  Pedal hair is absent  Skin temperature is WNL bilaterally  Musculoskeletal:  MMT is 5/5 in all muscle compartments bilaterally  ROM at the 1st MPJ and ankle joint are reduced bilaterally with the leg extended  Pain on palpation of right 3rd digit  Right 2nd digit is deformed in the transverse plane secondary to screw fracture  No pain on palpation of right 2nd digit  Dermatological:  Well-healed surgical scars are noted of the dorsal aspect of the 1st ray 2nd toe and 4th toe  No open lesions noted of left lower extremity  Lower extremity wound(s) as noted below:    Wound 1 located right 3rd digit, measures 0 3 cm x 1 cm x 0 5 cm  with sinus tracking or undermining  Wound bed appears eschar and fibrotic with slight Serosanguinous  Deepest tissue noted Bone  Malodor is not noticed  Wound edge appears Attached   Autumn-wound skin appears intact  Probe to bone is,  positive  Signs of infection are not present at this time  Neurological:  Gross sensation is intact  Protective sensation is intact  Patient Denies numbness and/or paresthesias  Clinical Images 06/27/20:    Right 3rd digit      Additional data:     Lab Results: I have personally reviewed pertinent labs including:    Results from last 7 days   Lab Units 06/27/20  0503 06/26/20  0528   WBC Thousand/uL 16 86* 23 63*   HEMOGLOBIN g/dL 11 9 11 5   HEMATOCRIT % 36 8 35 8   PLATELETS Thousands/uL 268 257   NEUTROS PCT %  --  84*   LYMPHS PCT %  --  7*   MONOS PCT %  --  8   EOS PCT %  --  0     Results from last 7 days   Lab Units 06/27/20  0503   POTASSIUM mmol/L 2 4*   CHLORIDE mmol/L 102   CO2 mmol/L 36*   BUN mg/dL 47*   CREATININE mg/dL 1 40*   CALCIUM mg/dL 8 2*   ALK PHOS U/L 93   ALT U/L 1,096*   AST U/L 604*     Results from last 7 days   Lab Units 06/26/20  2130   INR  1 33*       Cultures: I have personally reviewed pertinent cultures including:    Results from last 7 days   Lab Units 06/26/20  1121 06/26/20  1112   BLOOD CULTURE  Received in Microbiology Lab  Culture in Progress  Received in Microbiology Lab  Culture in Progress  Imaging: I have personally reviewed pertinent reports in PACS  EKG, Pathology, and Other Studies: I have personally reviewed pertinent reports  ** Please Note: Portions of the record may have been created with voice recognition software  Occasional wrong word or "sound a like" substitutions may have occurred due to the inherent limitations of voice recognition software  Read the chart carefully and recognize, using context, where substitutions have occurred   **

## 2020-06-27 NOTE — PROGRESS NOTES
Cardiology Progress Note - Radha Luo 66 y o  female MRN: [id number]    Unit/Bed#: [facility] 724-01 Encounter: [id number]      Assessment:  Principal Problem:    Acute hepatic encephalopathy  Active Problems:    PHILIPPE (acute kidney injury) ([address] )    Elevated troponin    Transaminitis    Elevated INR    Leukocytosis    Acute respiratory failure with hypoxia and hypercapnia (HCC)    Acute congestive heart failure (HCC)    Poor social situation    Localized swelling on right hand      Plan:  Patient is comfortable today  She has no chest pain or significant dyspnea  Echocardiogram with moderate to marked reduction in overall LV systolic function with ejection fraction of 35%  Patient noted to have segmental wall motion abnormality with mild LVH  Mild aortic, mitral and tricuspid regurgitation is noted  Patient will eventually need ischemic heart disease workup when appropriate with stabilization of her electrolytes and liver function  Patient with allergy to aspirin so will start clopidogrel 75 mg per day  BMP today with potassium of 2 4 and creatinine of 1 4  Liver function testing trending downward  Will eventually need ACE-inhibitor but would hold for now given metabolic abnormalities  Subjective:   Patient seen and examined  No significant events overnight   negative  Objective:     Vitals: Blood pressure 136/62, pulse 72, temperature (!) 97 2 °F (36 2 °C), resp  rate 18, height 5' (1 524 m), weight 79 kg (174 lb 2 6 oz), SpO2 98 %  , Body mass index is 34 01 kg/m² ,   Orthostatic Blood Pressures      Most Recent Value   Blood Pressure  136/62 filed at 06/27/2020 0721   Patient Position - Orthostatic VS  Standing - Orthostatic VS filed at 06/26/2020 0729      ,      Intake/Output Summary (Last 24 hours) at 6/27/2020 1055  Last data filed at 6/27/2020 0900  Gross per 24 hour   Intake 1183 83 ml   Output 3440 ml   Net -2256 17 ml       No significant arrhythmias seen on telemetry review         Physical Exam:    GEN: Naresh Albert appears well, alert and oriented x 3, pleasant and cooperative   NECK: supple, no carotid bruits, no JVD or HJR  HEART: normal rate, regular rhythm, normal S1 and S2, no murmurs, clicks, gallops or rubs   LUNGS: clear to auscultation bilaterally; no wheezes, rales, or rhonchi   ABDOMEN: normal bowel sounds, soft, no tenderness, no distention  EXTREMITIES: peripheral pulses normal; no clubbing, cyanosis, or edema  SKIN: warm and well perfused, no suspicious lesions on exposed skin    Labs & Results:    No results displayed because visit has over 200 results  Ct Chest Abdomen Pelvis Wo Contrast    Result Date: 6/25/2020  Narrative: CT CHEST, ABDOMEN AND PELVIS WITHOUT IV CONTRAST INDICATION:   found down, hypoxic  COMPARISON:  None  TECHNIQUE: CT examination of the chest, abdomen and pelvis was performed without intravenous contrast   Axial, sagittal, and coronal 2D reformatted images were created from the source data and submitted for interpretation  Radiation dose length product (DLP) for this visit:  1097 17 mGy-cm   This examination, like all CT scans performed in the [facility], was performed utilizing techniques to minimize radiation dose exposure, including the use of iterative reconstruction and automated exposure control  Enteric contrast was administered  FINDINGS: CHEST LUNGS:  Bilateral centrally oriented alveolar opacities are seen likely representing pulmonary edema although pneumonia is not entirely excluded  Elevated right hemidiaphragm is noted  Associated right lung base atelectasis is present  There is no tracheal or endobronchial lesion  PLEURA:  Small bilateral pleural effusions are present  HEART/GREAT VESSELS:  Unremarkable for patient's age  MEDIASTINUM AND SINA:  Unremarkable  CHEST WALL AND LOWER NECK:   Unremarkable  ABDOMEN LIVER/BILIARY TREE:  Unremarkable  GALLBLADDER:  No calcified gallstones  No pericholecystic inflammatory change  SPLEEN:  Unremarkable  PANCREAS:  Unremarkable  ADRENAL GLANDS:  Unremarkable  KIDNEYS/URETERS:  5 mm nonobstructing right renal pelvis calculus is identified  Left kidney is unremarkable  STOMACH AND BOWEL:  Large volume of rectal stool is present  APPENDIX:  No findings to suggest appendicitis  ABDOMINOPELVIC CAVITY:  No ascites  No pneumoperitoneum  No lymphadenopathy  VESSELS:  Unremarkable for patient's age  PELVIS REPRODUCTIVE ORGANS:  Surgical changes of prior hysterectomy  URINARY BLADDER:  Unremarkable  ABDOMINAL WALL/INGUINAL REGIONS:  Unremarkable  OSSEOUS STRUCTURES:  No acute fracture or destructive osseous lesion  Impression: Bilateral centrally oriented alveolar opacities likely representing pulmonary edema though pneumonia is not entirely excluded  Small bilateral pleural effusions  Elevated right hemidiaphragm with associated right lung base atelectasis  No acute intra-abdominal abnormality  Nonobstructing right renal pelvis calculus  Workstation performed: [id number]     Xr Foot 3+ Vw Right    Result Date: 6/27/2020  Narrative: RIGHT FOOT INDICATION:   edema and wound, patient states she has pin which was to be removed, poor historian  COMPARISON:  None VIEWS:  XR FOOT 3+ VW RIGHT Images: 3 FINDINGS: The osseous structures are demineralized  There is an age-indeterminate fracture involving the distal 4th phalanx, proximal to the 4th metatarsal head  Findings appear subacute  There are postoperative changes involving the toes  Prior osteotomy and bunionectomy 1st metatarsal   Fractured cerclage wire base of 1st proximal phalanx medially  Fusion of the 2nd toe with a screw traversing the 2nd proximal, middle and distal phalanges  There is fracture of the screw at the level of the 2nd proximal interphalangeal joint  There is fusion of the 3rd toe with a screw traversing the proximal, middle and distal phalanges  There is lucency surrounding the screw, suggesting loosening or infection  There has been prior screw fixation of the 4th toe  A residual screw fragment is present in the 4th proximal phalanx  There has been prior fixation of the 5th toe  The hardware has been removed  There is cortical irregularity involving the tuft of the 5th distal phalanx  Degenerative changes 1st and 2nd metatarsophalangeal joints  Degenerative changes in the midfoot and hindfoot  Plantar calcaneal spur  No lytic or blastic osseous lesion  Soft tissue swelling surrounding the 3rd and 5th toes  Impression: 1  Age-indeterminate distal 4th metatarsal fracture, likely subacute  2   Soft tissue swelling surrounding the 3rd and 5th toes, most compatible with cellulitis  There are underlying bony changes as described above  Superimposed osteomyelitis not excluded  If there is continued concern for osteomyelitis, consider follow-up MRI of the foot with gadolinium or nuclear medicine white blood cell scan  The study was marked in [facility] for immediate notification  Workstation performed: [id number]     Ct Head Wo Contrast    Result Date: 6/25/2020  Narrative: CT BRAIN - WITHOUT CONTRAST INDICATION:   ams  COMPARISON:  None  TECHNIQUE:  CT examination of the brain was performed  In addition to axial images, coronal 2D reformatted images were created and submitted for interpretation  Radiation dose length product (DLP) for this visit:  862 05 mGy-cm   This examination, like all CT scans performed in the [facility], was performed utilizing techniques to minimize radiation dose exposure, including the use of iterative  reconstruction and automated exposure control  IMAGE QUALITY:  Diagnostic  FINDINGS: PARENCHYMA:  No intracranial mass, mass effect or midline shift  No CT signs of acute infarction  No acute parenchymal hemorrhage  VENTRICLES AND EXTRA-AXIAL SPACES:  Normal for the patient's age  VISUALIZED ORBITS AND PARANASAL SINUSES:  Unremarkable   CALVARIUM AND EXTRACRANIAL SOFT TISSUES: Normal      Impression: No acute intracranial abnormality  Workstation performed: [id number]       EKG personally reviewed by Ophelia Kauffman MD      Counseling / Coordination of Care  Total floor / unit time spent today 30 minutes  Greater than 50% of total time was spent with the patient and / or family counseling and / or coordination of care

## 2020-06-27 NOTE — PLAN OF CARE
Problem: Potential for Falls  Goal: Patient will remain free of falls  Description  INTERVENTIONS:  - Assess patient frequently for physical needs  -  Identify cognitive and physical deficits and behaviors that affect risk of falls    -  [place] fall precautions as indicated by assessment   - Educate patient/family on patient safety including physical limitations  - Instruct patient to call for assistance with activity based on assessment  - Modify environment to reduce risk of injury  - Consider OT/PT consult to assist with strengthening/mobility  Outcome: Progressing     Problem: Prexisting or High Potential for Compromised Skin Integrity  Goal: Skin integrity is maintained or improved  Description  INTERVENTIONS:  - Identify patients at risk for skin breakdown  - Assess and monitor skin integrity  - Assess and monitor nutrition and hydration status  - Monitor labs   - Assess for incontinence   - Turn and reposition patient  - Assist with mobility/ambulation  - Relieve pressure over bony prominences  - Avoid friction and shearing  - Provide appropriate hygiene as needed including keeping skin clean and dry  - Evaluate need for skin moisturizer/barrier cream  - Collaborate with interdisciplinary team   - Patient/family teaching  - Consider wound care consult   Outcome: Progressing     Problem: SAFETY ADULT  Goal: Maintain or return to baseline ADL function  Description  INTERVENTIONS:  -  Assess patient's ability to carry out ADLs; assess patient's baseline for ADL function and identify physical deficits which impact ability to perform ADLs (bathing, care of mouth/teeth, toileting, grooming, dressing, etc )  - Assess/evaluate cause of self-care deficits   - Assess range of motion  - Assess patient's mobility; develop plan if impaired  - Assess patient's need for assistive devices and provide as appropriate  - Encourage maximum independence but intervene and supervise when necessary  - Involve family in performance of ADLs  - Assess for home care needs following discharge   - Consider OT consult to assist with ADL evaluation and planning for discharge  - Provide patient education as appropriate  Outcome: Progressing  Goal: Maintain or return mobility status to optimal level  Description  INTERVENTIONS:  - Assess patient's baseline mobility status (ambulation, transfers, stairs, etc )    - Identify cognitive and physical deficits and behaviors that affect mobility  - Identify mobility aids required to assist with transfers and/or ambulation (gait belt, sit-to-stand, lift, walker, cane, etc )  - [place] fall precautions as indicated by assessment  - Record patient progress and toleration of activity level on Mobility SBAR; progress patient to next Phase/Stage  - Instruct patient to call for assistance with activity based on assessment  - Consider rehabilitation consult to assist with strengthening/weightbearing, etc   Outcome: Progressing     Problem: DISCHARGE PLANNING  Goal: Discharge to home or other facility with appropriate resources  Description  INTERVENTIONS:  - Identify barriers to discharge w/patient and caregiver  - Arrange for needed discharge resources and transportation as appropriate  - Identify discharge learning needs (meds, wound care, etc )  - Refer to Case Management Department for coordinating discharge planning if the patient needs post-hospital services based on physician/advanced practitioner order or complex needs related to functional status, cognitive ability, or social support system   Outcome: Progressing     Problem: CARDIOVASCULAR - ADULT  Goal: Maintains optimal cardiac output and hemodynamic stability  Description  INTERVENTIONS:  - Monitor I/O, vital signs and rhythm  - Monitor for S/S and trends of decreased cardiac output  - Administer and titrate ordered vasoactive medications to optimize hemodynamic stability  - Assess quality of pulses, skin color and temperature  - Assess for signs of decreased coronary artery perfusion  - Instruct patient to report change in severity of symptoms  Outcome: Progressing     Problem: METABOLIC, FLUID AND ELECTROLYTES - ADULT  Goal: Electrolytes maintained within normal limits  Description  INTERVENTIONS:  - Monitor labs and assess patient for signs and symptoms of electrolyte imbalances  - Administer electrolyte replacement as ordered  - Monitor response to electrolyte replacements, including repeat lab results as appropriate  - Instruct patient on fluid and nutrition as appropriate  Outcome: Progressing  Goal: Fluid balance maintained  Description  INTERVENTIONS:  - Monitor labs   - Monitor I/O and WT  - Instruct patient on fluid and nutrition as appropriate  - Assess for signs & symptoms of volume excess or deficit  Outcome: Progressing     Problem: SKIN/TISSUE INTEGRITY - ADULT  Goal: Skin integrity remains intact  Description  INTERVENTIONS  - Identify patients at risk for skin breakdown  - Assess and monitor skin integrity  - Assess and monitor nutrition and hydration status  - Monitor labs (i e  albumin)  - Assess for incontinence   - Turn and reposition patient  - Assist with mobility/ambulation  - Relieve pressure over bony prominences  - Avoid friction and shearing  - Provide appropriate hygiene as needed including keeping skin clean and dry  - Evaluate need for skin moisturizer/barrier cream  - Collaborate with interdisciplinary team (i e  Nutrition, Rehabilitation, etc )   - Patient/family teaching  Outcome: Progressing     Problem: MUSCULOSKELETAL - ADULT  Goal: Maintain or return mobility to safest level of function  Description  INTERVENTIONS:  - Assess patient's ability to carry out ADLs; assess patient's baseline for ADL function and identify physical deficits which impact ability to perform ADLs (bathing, care of mouth/teeth, toileting, grooming, dressing, etc )  - Assess/evaluate cause of self-care deficits   - Assess range of motion  - Assess patient's mobility  - Assess patient's need for assistive devices and provide as appropriate  - Encourage maximum independence but intervene and supervise when necessary  - Involve family in performance of ADLs  - Assess for home care needs following discharge   - Consider OT consult to assist with ADL evaluation and planning for discharge  - Provide patient education as appropriate  Outcome: Progressing  Goal: Maintain proper alignment of affected body part  Description  INTERVENTIONS:  - Support, maintain and protect limb and body alignment  - Provide patient/ family with appropriate education  Outcome: Progressing     Problem: NEUROSENSORY - ADULT  Goal: Achieves stable or improved neurological status  Description  INTERVENTIONS  - Monitor and report changes in neurological status  - Monitor vital signs such as temperature, blood pressure, glucose, and any other labs ordered   - Initiate measures to prevent increased intracranial pressure  - Monitor for seizure activity and implement precautions if appropriate      Outcome: Progressing

## 2020-06-27 NOTE — PROGRESS NOTES
Vancomycin IV Pharmacy-to-Dose Consultation    Ronald Winchester is a 66 y o  female who is currently receiving Vancomycin IV with management by the Pharmacy Consult service  Relevant clinical data and objective / subjective history reviewed  Vancomycin Assessment:  Indication: MRSA suspected, skin-soft tissue infection, Pneumonia    Status: AFVSS on 2-3L NC  Micro: 6/25 SARS-CoV-2 neg, 6/26 blood cx NGTD, 6/27 Strep/Leg UAG, MRSA swab, & sputum cx ordered  Procalcitonin: 8 81 > 6 24 (6/26 AM > PM)  Renal Function: unknown baseline; SCr has significantly improved from admit of 2 48>>1 4 today; UOP adequate  Potential Nephrotoxicity Factors (select ALL that apply):  Medications: diuretics, IV contrast,   Patient-Factors: elderly, renal dysfunction  Days of Therapy: 1  Current Dose: 750mg (10mg/kg TBW) IV Q24H (no dose yet given)  Goal Trough: 15-20 (appropriate for most indications)   Goal AUC(24h): 400-600  Last Level: no levels  Pharmacokinetic Analysis: based on CrCl in the upper 20 to low 30 range and improving, recommend empiric dose increase      Vancomycin Plan:  New Dosing: change to 1250mg (20mg/kg AdjBW) IV Q24H (next dose: 1200 today)  Next Level: 6/30 (Tue) at 1130 prior to 4th dose  Renal Function Monitoring: at least every other day while renal function acutely changing      Pharmacy will continue to follow closely for s/sx of nephrotoxicity, infusion reactions and appropriateness of therapy  BMP and CBC will be ordered per protocol  We will continue to follow the patients culture results and clinical progress daily  Chinmay Dooley Pharm  D , [facility] [place]  Clinical Pharmacist, [address]  [phone] or [place]

## 2020-06-27 NOTE — PROGRESS NOTES
Gastroenterology Progress Note   - Wil Marcos 66 y o  female MRN: [id number]    [facility] Encounter: [id number]      Assessment and Plan:   Principal Problem:    Acute hepatic encephalopathy  70-year-old female patient admitted with acute encephalopathy suspected to be secondary to acetaminophen toxicity  Patient was treated with acetyl cystine  Currently transaminases trending down  Normal bilirubin, normal INR yesterday  Right upper quadrant ultrasound with Dopplers is pending  She is awake alert oriented x3, minimal tremor  Continue current treatment      Subjective:   Patient seen and examined at the bed, denies any events overnight, currently is tolerating PO route, denies nausea or vomiting, is passing flatus and having bowel movements, denies chest pain or shortness of breath, no abdominal pain, patient is not ambulating     Objective:     Vitals: Blood pressure 117/64, pulse 63, temperature (!) 97 2 °F (36 2 °C), resp  rate 18, height 5' (1 524 m), weight 79 kg (174 lb 2 6 oz), SpO2 95 %  ,Body mass index is 34 01 kg/m²  Intake/Output Summary (Last 24 hours) at 6/27/2020 1432  Last data filed at 6/27/2020 1353  Gross per 24 hour   Intake 1264 ml   Output 4670 ml   Net -3406 ml       Physical Exam:   Physical Exam   Constitutional: She is oriented to person, place, and time  She appears well-developed and well-nourished  No distress  HENT:   Head: Normocephalic and atraumatic  Mouth/Throat: Oropharynx is clear and moist    Eyes: EOM are normal    Neck: Normal range of motion  Neck supple  Cardiovascular: Normal rate  Pulmonary/Chest: Effort normal and breath sounds normal    Abdominal: Soft  Bowel sounds are normal  She exhibits no mass  There is no tenderness  There is no rebound and no guarding  Neurological: She is alert and oriented to person, place, and time  Minimal tremor   Skin: Skin is warm and dry  Nursing note and vitals reviewed        Invasive Devices     Peripheral Intravenous Line            Peripheral IV 06/25/20 Left Antecubital 1 day          Drain            External Urinary Catheter 1 day                Lab Results:  Results from last 7 days   Lab Units 06/27/20  0503 06/26/20  0528   WBC Thousand/uL 16 86* 23 63*   HEMOGLOBIN g/dL 11 9 11 5   HEMATOCRIT % 36 8 35 8   PLATELETS Thousands/uL 268 257   NEUTROS PCT %  --  84*   LYMPHS PCT %  --  7*   MONOS PCT %  --  8   EOS PCT %  --  0     Results from last 7 days   Lab Units 06/27/20  0503   POTASSIUM mmol/L 2 4*   CHLORIDE mmol/L 102   CO2 mmol/L 36*   BUN mg/dL 47*   CREATININE mg/dL 1 40*   CALCIUM mg/dL 8 2*   ALK PHOS U/L 93   ALT U/L 1,096*   AST U/L 604*     Results from last 7 days   Lab Units 06/26/20  2130   INR  1 33*           Imaging Studies: I have personally reviewed pertinent imaging studies  Ct Chest Abdomen Pelvis Wo Contrast    Result Date: 6/25/2020  Impression: Bilateral centrally oriented alveolar opacities likely representing pulmonary edema though pneumonia is not entirely excluded  Small bilateral pleural effusions  Elevated right hemidiaphragm with associated right lung base atelectasis  No acute intra-abdominal abnormality  Nonobstructing right renal pelvis calculus  Workstation performed: [id number]     Xr Foot 3+ Vw Right    Result Date: 6/27/2020  Impression: 1  Age-indeterminate distal 4th metatarsal fracture, likely subacute  2   Soft tissue swelling surrounding the 3rd and 5th toes, most compatible with cellulitis  There are underlying bony changes as described above  Superimposed osteomyelitis not excluded  If there is continued concern for osteomyelitis, consider follow-up MRI of the foot with gadolinium or nuclear medicine white blood cell scan  The study was marked in [facility] for immediate notification  Workstation performed: [id number]     Ct Head Wo Contrast    Result Date: 6/25/2020  Impression: No acute intracranial abnormality   Workstation performed: [id number]

## 2020-06-28 LAB
ALBUMIN SERPL BCP-MCNC: 3.1 G/DL (ref 3.5–5)
ALP SERPL-CCNC: 95 U/L (ref 46–116)
ALT SERPL W P-5'-P-CCNC: 794 U/L (ref 12–78)
ANION GAP SERPL CALCULATED.3IONS-SCNC: 10 MMOL/L (ref 4–13)
AST SERPL W P-5'-P-CCNC: 321 U/L (ref 5–45)
BASOPHILS # BLD AUTO: 0.06 THOUSANDS/ΜL (ref 0–0.1)
BASOPHILS NFR BLD AUTO: 0 % (ref 0–1)
BILIRUB DIRECT SERPL-MCNC: 0.35 MG/DL (ref 0–0.2)
BILIRUB SERPL-MCNC: 0.62 MG/DL (ref 0.2–1)
BUN SERPL-MCNC: 34 MG/DL (ref 5–25)
CALCIUM SERPL-MCNC: 8.1 MG/DL (ref 8.3–10.1)
CHLORIDE SERPL-SCNC: 97 MMOL/L (ref 100–108)
CO2 SERPL-SCNC: 35 MMOL/L (ref 21–32)
CREAT SERPL-MCNC: 1.13 MG/DL (ref 0.6–1.3)
EOSINOPHIL # BLD AUTO: 0.34 THOUSAND/ΜL (ref 0–0.61)
EOSINOPHIL NFR BLD AUTO: 2 % (ref 0–6)
ERYTHROCYTE [DISTWIDTH] IN BLOOD BY AUTOMATED COUNT: 13.2 % (ref 11.6–15.1)
GFR SERPL CREATININE-BSD FRML MDRD: 47 ML/MIN/1.73SQ M
GLUCOSE SERPL-MCNC: 135 MG/DL (ref 65–140)
HCT VFR BLD AUTO: 37.9 % (ref 34.8–46.1)
HGB BLD-MCNC: 11.9 G/DL (ref 11.5–15.4)
IMM GRANULOCYTES # BLD AUTO: 0.1 THOUSAND/UL (ref 0–0.2)
IMM GRANULOCYTES NFR BLD AUTO: 1 % (ref 0–2)
LYMPHOCYTES # BLD AUTO: 2.25 THOUSANDS/ΜL (ref 0.6–4.47)
LYMPHOCYTES NFR BLD AUTO: 15 % (ref 14–44)
MCH RBC QN AUTO: 31.4 PG (ref 26.8–34.3)
MCHC RBC AUTO-ENTMCNC: 31.4 G/DL (ref 31.4–37.4)
MCV RBC AUTO: 100 FL (ref 82–98)
MONOCYTES # BLD AUTO: 1.87 THOUSAND/ΜL (ref 0.17–1.22)
MONOCYTES NFR BLD AUTO: 13 % (ref 4–12)
MRSA NOSE QL CULT: NORMAL
NEUTROPHILS # BLD AUTO: 10.25 THOUSANDS/ΜL (ref 1.85–7.62)
NEUTS SEG NFR BLD AUTO: 69 % (ref 43–75)
NRBC BLD AUTO-RTO: 0 /100 WBCS
PLATELET # BLD AUTO: 287 THOUSANDS/UL (ref 149–390)
PMV BLD AUTO: 13.1 FL (ref 8.9–12.7)
POTASSIUM SERPL-SCNC: 3 MMOL/L (ref 3.5–5.3)
PROCALCITONIN SERPL-MCNC: 2.24 NG/ML
PROT SERPL-MCNC: 7 G/DL (ref 6.4–8.2)
RBC # BLD AUTO: 3.79 MILLION/UL (ref 3.81–5.12)
SODIUM SERPL-SCNC: 142 MMOL/L (ref 136–145)
WBC # BLD AUTO: 14.87 THOUSAND/UL (ref 4.31–10.16)

## 2020-06-28 PROCEDURE — 99232 SBSQ HOSP IP/OBS MODERATE 35: CPT | Performed by: INTERNAL MEDICINE

## 2020-06-28 PROCEDURE — 84145 PROCALCITONIN (PCT): CPT | Performed by: INTERNAL MEDICINE

## 2020-06-28 PROCEDURE — 97167 OT EVAL HIGH COMPLEX 60 MIN: CPT

## 2020-06-28 PROCEDURE — 85025 COMPLETE CBC W/AUTO DIFF WBC: CPT | Performed by: INTERNAL MEDICINE

## 2020-06-28 PROCEDURE — 80048 BASIC METABOLIC PNL TOTAL CA: CPT | Performed by: INTERNAL MEDICINE

## 2020-06-28 PROCEDURE — 80076 HEPATIC FUNCTION PANEL: CPT | Performed by: INTERNAL MEDICINE

## 2020-06-28 RX ORDER — FUROSEMIDE 40 MG/1
40 TABLET ORAL DAILY
Status: DISCONTINUED | OUTPATIENT
Start: 2020-06-29 | End: 2020-06-29

## 2020-06-28 RX ORDER — POTASSIUM CHLORIDE 14.9 MG/ML
20 INJECTION INTRAVENOUS
Status: DISPENSED | OUTPATIENT
Start: 2020-06-28 | End: 2020-06-28

## 2020-06-28 RX ORDER — LANOLIN ALCOHOL/MO/W.PET/CERES
6 CREAM (GRAM) TOPICAL
Status: DISCONTINUED | OUTPATIENT
Start: 2020-06-28 | End: 2020-07-03 | Stop reason: HOSPADM

## 2020-06-28 RX ADMIN — CEFEPIME HYDROCHLORIDE 1000 MG: 1 INJECTION, POWDER, FOR SOLUTION INTRAMUSCULAR; INTRAVENOUS at 11:11

## 2020-06-28 RX ADMIN — HEPARIN SODIUM 5000 UNITS: 5000 INJECTION INTRAVENOUS; SUBCUTANEOUS at 05:30

## 2020-06-28 RX ADMIN — AMLODIPINE BESYLATE 5 MG: 5 TABLET ORAL at 08:01

## 2020-06-28 RX ADMIN — METRONIDAZOLE 500 MG: 500 TABLET ORAL at 21:59

## 2020-06-28 RX ADMIN — POTASSIUM CHLORIDE 20 MEQ: 14.9 INJECTION, SOLUTION INTRAVENOUS at 13:08

## 2020-06-28 RX ADMIN — POTASSIUM CHLORIDE 20 MEQ: 14.9 INJECTION, SOLUTION INTRAVENOUS at 10:30

## 2020-06-28 RX ADMIN — LEVOTHYROXINE SODIUM 125 MCG: 125 TABLET ORAL at 05:31

## 2020-06-28 RX ADMIN — HEPARIN SODIUM 5000 UNITS: 5000 INJECTION INTRAVENOUS; SUBCUTANEOUS at 21:59

## 2020-06-28 RX ADMIN — METRONIDAZOLE 500 MG: 500 TABLET ORAL at 05:31

## 2020-06-28 RX ADMIN — METRONIDAZOLE 500 MG: 500 TABLET ORAL at 14:13

## 2020-06-28 RX ADMIN — PHYTONADIONE 5 MG: 10 INJECTION, EMULSION INTRAMUSCULAR; INTRAVENOUS; SUBCUTANEOUS at 08:30

## 2020-06-28 RX ADMIN — METOPROLOL TARTRATE 100 MG: 50 TABLET, FILM COATED ORAL at 21:59

## 2020-06-28 RX ADMIN — METOPROLOL TARTRATE 100 MG: 50 TABLET, FILM COATED ORAL at 08:00

## 2020-06-28 RX ADMIN — VANCOMYCIN HYDROCHLORIDE 1250 MG: 1 INJECTION, POWDER, LYOPHILIZED, FOR SOLUTION INTRAVENOUS at 14:08

## 2020-06-28 RX ADMIN — CEFEPIME HYDROCHLORIDE 1000 MG: 1 INJECTION, POWDER, FOR SOLUTION INTRAMUSCULAR; INTRAVENOUS at 23:09

## 2020-06-28 RX ADMIN — FUROSEMIDE 40 MG: 40 TABLET ORAL at 08:01

## 2020-06-28 RX ADMIN — HEPARIN SODIUM 5000 UNITS: 5000 INJECTION INTRAVENOUS; SUBCUTANEOUS at 13:07

## 2020-06-28 RX ADMIN — MELATONIN 6 MG: at 21:59

## 2020-06-28 RX ADMIN — CLOPIDOGREL BISULFATE 75 MG: 75 TABLET ORAL at 08:01

## 2020-06-28 NOTE — PLAN OF CARE
Problem: Potential for Falls  Goal: Patient will remain free of falls  Description  INTERVENTIONS:  - Assess patient frequently for physical needs  -  Identify cognitive and physical deficits and behaviors that affect risk of falls    -  [place] fall precautions as indicated by assessment   - Educate patient/family on patient safety including physical limitations  - Instruct patient to call for assistance with activity based on assessment  - Modify environment to reduce risk of injury  - Consider OT/PT consult to assist with strengthening/mobility  Outcome: Progressing     Problem: Prexisting or High Potential for Compromised Skin Integrity  Goal: Skin integrity is maintained or improved  Description  INTERVENTIONS:  - Identify patients at risk for skin breakdown  - Assess and monitor skin integrity  - Assess and monitor nutrition and hydration status  - Monitor labs   - Assess for incontinence   - Turn and reposition patient  - Assist with mobility/ambulation  - Relieve pressure over bony prominences  - Avoid friction and shearing  - Provide appropriate hygiene as needed including keeping skin clean and dry  - Evaluate need for skin moisturizer/barrier cream  - Collaborate with interdisciplinary team   - Patient/family teaching  - Consider wound care consult   Outcome: Progressing     Problem: SAFETY ADULT  Goal: Maintain or return to baseline ADL function  Description  INTERVENTIONS:  -  Assess patient's ability to carry out ADLs; assess patient's baseline for ADL function and identify physical deficits which impact ability to perform ADLs (bathing, care of mouth/teeth, toileting, grooming, dressing, etc )  - Assess/evaluate cause of self-care deficits   - Assess range of motion  - Assess patient's mobility; develop plan if impaired  - Assess patient's need for assistive devices and provide as appropriate  - Encourage maximum independence but intervene and supervise when necessary  - Involve family in performance of ADLs  - Assess for home care needs following discharge   - Consider OT consult to assist with ADL evaluation and planning for discharge  - Provide patient education as appropriate  Outcome: Progressing  Goal: Maintain or return mobility status to optimal level  Description  INTERVENTIONS:  - Assess patient's baseline mobility status (ambulation, transfers, stairs, etc )    - Identify cognitive and physical deficits and behaviors that affect mobility  - Identify mobility aids required to assist with transfers and/or ambulation (gait belt, sit-to-stand, lift, walker, cane, etc )  - [place] fall precautions as indicated by assessment  - Record patient progress and toleration of activity level on Mobility SBAR; progress patient to next Phase/Stage  - Instruct patient to call for assistance with activity based on assessment  - Consider rehabilitation consult to assist with strengthening/weightbearing, etc   Outcome: Progressing     Problem: DISCHARGE PLANNING  Goal: Discharge to home or other facility with appropriate resources  Description  INTERVENTIONS:  - Identify barriers to discharge w/patient and caregiver  - Arrange for needed discharge resources and transportation as appropriate  - Identify discharge learning needs (meds, wound care, etc )  - Refer to Case Management Department for coordinating discharge planning if the patient needs post-hospital services based on physician/advanced practitioner order or complex needs related to functional status, cognitive ability, or social support system   Outcome: Progressing     Problem: CARDIOVASCULAR - ADULT  Goal: Maintains optimal cardiac output and hemodynamic stability  Description  INTERVENTIONS:  - Monitor I/O, vital signs and rhythm  - Monitor for S/S and trends of decreased cardiac output  - Administer and titrate ordered vasoactive medications to optimize hemodynamic stability  - Assess quality of pulses, skin color and temperature  - Assess for signs of decreased coronary artery perfusion  - Instruct patient to report change in severity of symptoms  Outcome: Progressing     Problem: METABOLIC, FLUID AND ELECTROLYTES - ADULT  Goal: Electrolytes maintained within normal limits  Description  INTERVENTIONS:  - Monitor labs and assess patient for signs and symptoms of electrolyte imbalances  - Administer electrolyte replacement as ordered  - Monitor response to electrolyte replacements, including repeat lab results as appropriate  - Instruct patient on fluid and nutrition as appropriate  Outcome: Progressing  Goal: Fluid balance maintained  Description  INTERVENTIONS:  - Monitor labs   - Monitor I/O and WT  - Instruct patient on fluid and nutrition as appropriate  - Assess for signs & symptoms of volume excess or deficit  Outcome: Progressing     Problem: SKIN/TISSUE INTEGRITY - ADULT  Goal: Skin integrity remains intact  Description  INTERVENTIONS  - Identify patients at risk for skin breakdown  - Assess and monitor skin integrity  - Assess and monitor nutrition and hydration status  - Monitor labs (i e  albumin)  - Assess for incontinence   - Turn and reposition patient  - Assist with mobility/ambulation  - Relieve pressure over bony prominences  - Avoid friction and shearing  - Provide appropriate hygiene as needed including keeping skin clean and dry  - Evaluate need for skin moisturizer/barrier cream  - Collaborate with interdisciplinary team (i e  Nutrition, Rehabilitation, etc )   - Patient/family teaching  Outcome: Progressing     Problem: MUSCULOSKELETAL - ADULT  Goal: Maintain or return mobility to safest level of function  Description  INTERVENTIONS:  - Assess patient's ability to carry out ADLs; assess patient's baseline for ADL function and identify physical deficits which impact ability to perform ADLs (bathing, care of mouth/teeth, toileting, grooming, dressing, etc )  - Assess/evaluate cause of self-care deficits   - Assess range of motion  - Assess patient's mobility  - Assess patient's need for assistive devices and provide as appropriate  - Encourage maximum independence but intervene and supervise when necessary  - Involve family in performance of ADLs  - Assess for home care needs following discharge   - Consider OT consult to assist with ADL evaluation and planning for discharge  - Provide patient education as appropriate  Outcome: Progressing  Goal: Maintain proper alignment of affected body part  Description  INTERVENTIONS:  - Support, maintain and protect limb and body alignment  - Provide patient/ family with appropriate education  Outcome: Progressing     Problem: NEUROSENSORY - ADULT  Goal: Achieves stable or improved neurological status  Description  INTERVENTIONS  - Monitor and report changes in neurological status  - Monitor vital signs such as temperature, blood pressure, glucose, and any other labs ordered   - Initiate measures to prevent increased intracranial pressure  - Monitor for seizure activity and implement precautions if appropriate      Outcome: Progressing

## 2020-06-28 NOTE — PROGRESS NOTES
Gastroenterology Progress Note   - Oren Gonzales 66 y o  female MRN: [id number]    [facility]: [id number]      Assessment and Plan:   Principal Problem:    Acute hepatic encephalopathy  33-year-old female patient admitted with acute encephalopathy suspected to be secondary to acetaminophen toxicity  Patient was treated with acetyl cystine  Currently transaminases trending down  Normal bilirubin, normal INR yesterday  Right upper quadrant ultrasound with Dopplers was normal  She is awake alert oriented x3, minimal tremor  Continue current treatment    Subjective:   Patient seen and examined at the bed, denies any events overnight, currently is tolerating PO route, denies nausea or vomiting, is passing flatus and having bowel movements, denies chest pain or shortness of breath, no abdominal pain, patient is not ambulating     Objective:     Vitals: Blood pressure 138/61, pulse 73, temperature 98 6 °F (37 °C), resp  rate 18, height 5' (1 524 m), weight 79 2 kg (174 lb 9 7 oz), SpO2 (!) 87 %  ,Body mass index is 34 1 kg/m²  Intake/Output Summary (Last 24 hours) at 6/28/2020 1325  Last data filed at 6/28/2020 1308  Gross per 24 hour   Intake 2685 72 ml   Output 3260 ml   Net -574 28 ml       Physical Exam:   Physical Exam   Constitutional: She is oriented to person, place, and time  She appears well-developed and well-nourished  No distress  HENT:   Head: Normocephalic and atraumatic  Mouth/Throat: Oropharynx is clear and moist    Eyes: EOM are normal    Neck: Normal range of motion  Neck supple  Cardiovascular: Normal rate  Pulmonary/Chest: Effort normal and breath sounds normal    Abdominal: Soft  Bowel sounds are normal  She exhibits no mass  There is no tenderness  There is no rebound and no guarding  Neurological: She is alert and oriented to person, place, and time  Skin: Skin is warm and dry  Nursing note and vitals reviewed        Invasive Devices     Peripheral Intravenous Line            Peripheral IV 06/25/20 Left Antecubital 2 days    Peripheral IV 06/27/20 Right Antecubital less than 1 day          Drain            External Urinary Catheter 2 days                Lab Results:  Results from last 7 days   Lab Units 06/28/20  0500   WBC Thousand/uL 14 87*   HEMOGLOBIN g/dL 11 9   HEMATOCRIT % 37 9   PLATELETS Thousands/uL 287   NEUTROS PCT % 69   LYMPHS PCT % 15   MONOS PCT % 13*   EOS PCT % 2     Results from last 7 days   Lab Units 06/28/20  0500   POTASSIUM mmol/L 3 0*   CHLORIDE mmol/L 97*   CO2 mmol/L 35*   BUN mg/dL 34*   CREATININE mg/dL 1 13   CALCIUM mg/dL 8 1*   ALK PHOS U/L 95   ALT U/L 794*   AST U/L 321*     Results from last 7 days   Lab Units 06/27/20  1435   INR  1 21*           Imaging Studies: I have personally reviewed pertinent imaging studies  Ct Chest Abdomen Pelvis Wo Contrast    Result Date: 6/25/2020  Impression: Bilateral centrally oriented alveolar opacities likely representing pulmonary edema though pneumonia is not entirely excluded  Small bilateral pleural effusions  Elevated right hemidiaphragm with associated right lung base atelectasis  No acute intra-abdominal abnormality  Nonobstructing right renal pelvis calculus  Workstation performed: [id number]     Xr Foot 3+ Vw Right    Result Date: 6/27/2020  Impression: 1  Age-indeterminate distal 4th metatarsal fracture, likely subacute  2   Soft tissue swelling surrounding the 3rd and 5th toes, most compatible with cellulitis  There are underlying bony changes as described above  Superimposed osteomyelitis not excluded  If there is continued concern for osteomyelitis, consider follow-up MRI of the foot with gadolinium or nuclear medicine white blood cell scan  The study was marked in [facility] for immediate notification  Workstation performed: [id number]     Ct Head Wo Contrast    Result Date: 6/25/2020  Impression: No acute intracranial abnormality   Workstation performed: [id number]      Right Upper Quadrant With Liver Dopplers    Result Date: 6/27/2020  Impression: Normal liver and biliary tree  Patent hepatic vasculature with normal direction of blood flow  A single subcentimeter calcification within the gallbladder fundus is identified which could be a mural calcification or adherent calculus  No evidence of acute cholecystitis  Unremarkable right kidney  Known 5 mm nonobstructing calculus within the pelvis seen on recent CT is not well appreciated  Pancreas is obscured   Workstation performed: [id number]

## 2020-06-28 NOTE — ASSESSMENT & PLAN NOTE
· CT chest with bilateral centrally oriented alveolar opacities   · Opacities mainly due to volume overload however has elevated procalcitonin and leukocytosis  · Was found unresponsive - concern for aspiration  · Cefepime/vancomycin/metronidazole  · Sputum culture, urine Legionella, strep pneumo antigen, MRSA swab  · Follow-up blood cultures  · Monitor temperature, WBC  · Trend procalcitonin

## 2020-06-28 NOTE — PROGRESS NOTES
Follow up Consultation    Nephrology   Jennifer Moeller 66 y o  female MRN: [id number]  Unit/Bed#: [facility] 724-01 Encounter: [id number]      Physician Requesting Consult: Jennifer Cerda MD  Reason for Consult:  Acute kidney injury      1  ASSESSMENT/PLAN:  Acute kidney injury (POA):  · Suspected to be multifactorial - component of prerenal azotemia from fluid overload + possible heavy NSAID use + elevated CK  UA is not consistent with a GN or AIN but does have moderate blood with only 2-4 RBCs  No hydronephrosis on CT imaging  · At this time, the patient examines euvolemic to hypovolemic  · Will decrease Lasix further to 40 mg p o  Q day for now  · Trend creatinine and monitor I/O + avoid nephrotoxic medications  · Creatinine stable and improved down to 1 13 mg/dL  · Acid base lytes stable except for hypokalemia     2  Respiratory failure:  · Suspected to be due to CHF + fluid overload  · Monitor with diuresis       3  Fluid overload:  · Unclear if has a history of CHF  Echo was been ordered  · Decrease Lasix to 40 mg p o  Q day  · Keep O>I       4  Hypokalemia:  · Will give KCl IV today  · Recheck BMP     5  Transaminitis:  · Suspected to be Tylenol overdose  · Discontinue acetylcysteine  · Follow-up with toxicology     6  Abnormal cardiac biomarkers  · Troponin 11 0 on admission  · Follow-up with cardiology  · Most recent echo from 06/26/2020 showed EF of 29% grade 2 diastolic dysfunction      7  Hypertension:  · BP is controlled  Monitor with diuresis  On Norvasc 5 mg p o  Q day, metoprolol 100 mg p o  B i d      8  Elevated CK:  · CK level of 1194 on admission  Now down to 281  · Likely to being down for an extended period of time  · No need for IVF since she is fluid overloaded  · Would simply promote urinary flow with diuretics       9  Encephalopathy  10  Pleural effusions  11   Leukocytosis:  On cefepime, Flagyl, vancomycin    Thanks for the consult  Will continue to follow  Please call with questions/ concerns  Above-mentioned orders and Plan in terms of acute kidney injury was discussed with the team in [address] MD, FASN, 2020, 9:32 AM              Objective :   Patient seen and examined in her room no overnight events hemodynamically stable urine output close to 2 8 L in last 24 hours and thus far 1 2 L this a m  Remains afebrile  Denies shortness of breath  PHYSICAL EXAM  /61   Pulse 73   Temp 98 6 °F (37 °C)   Resp 18   Ht 5' (1 524 m)   Wt 79 2 kg (174 lb 9 7 oz)   SpO2 (!) 87%   BMI 34 10 kg/m²   Temp (24hrs), Av 7 °F (36 5 °C), Min:96 4 °F (35 8 °C), Max:98 9 °F (37 2 °C)        Intake/Output Summary (Last 24 hours) at 2020 0932  Last data filed at 2020 0910  Gross per 24 hour   Intake  72 ml   Output 3530 ml   Net -1514 28 ml       I/O last 24 hours: In: 2495 7 [P O :1904; IV Piggyback:591 7]  Out: 9263 [Urine:4070]      Current Weight: Weight - Scale: 79 2 kg (174 lb 9 7 oz)  First Weight: Weight - Scale: 78 3 kg (172 lb 9 9 oz)  Physical Exam   Constitutional: She is oriented to person, place, and time  She appears well-developed and well-nourished  No distress  HENT:   Head: Normocephalic and atraumatic  Mouth/Throat: Oropharynx is clear and moist  No oropharyngeal exudate  Eyes: Conjunctivae are normal  No scleral icterus  Neck: Normal range of motion  Neck supple  No JVD present  Cardiovascular: Normal heart sounds  Exam reveals no friction rub  Pulmonary/Chest: Effort normal  She has no wheezes  She has no rales  Abdominal: Soft  She exhibits no mass  There is no tenderness  Musculoskeletal: She exhibits no edema  Neurological: She is alert and oriented to person, place, and time  Skin: Skin is warm and dry  No rash noted  She is not diaphoretic  Psychiatric: She has a normal mood and affect  Nursing note and vitals reviewed  Review of Systems   Constitutional: Positive for fatigue   Negative for chills and fever  HENT: Negative for congestion  Respiratory: Negative for cough, shortness of breath and wheezing  Cardiovascular: Negative for leg swelling  Gastrointestinal: Negative for abdominal pain, constipation, diarrhea, nausea and vomiting  Genitourinary: Negative for difficulty urinating  Musculoskeletal: Negative for back pain  Skin: Negative for rash  Neurological: Negative for dizziness and headaches  Psychiatric/Behavioral: Negative for agitation  All other systems reviewed and are negative  Scheduled Meds:    Current Facility-Administered Medications:  albuterol 2 5 mg Nebulization Q4H PRN Marta Dillard MD    aluminum-magnesium hydroxide-simethicone 5 mL Oral Q6H PRN Collin Yeung MD    amLODIPine 5 mg Oral Daily Collin Yeung MD    cefepime 1,000 mg Intravenous Q12H Marta Dillard MD Last Rate: Stopped (06/27/20 2305)   clopidogrel 75 mg Oral Daily Raquel Hilton MD    furosemide 40 mg Oral BID (diuretic) Gerardo Kaiser MD    heparin (porcine) 5,000 Units Subcutaneous formerly [facility] Collin Yeung MD    levothyroxine 125 mcg Oral Early Morning Collin Yeung MD    melatonin 3 mg Oral HS Collin Yeung MD    metoprolol tartrate 100 mg Oral Q12H Belem Maldonado MD    metroNIDAZOLE 500 mg Oral formerly [facility] Marta Dillard MD    ondansetron 4 mg Intravenous Q6H PRN Collin Yeung MD    vancomycin 20 mg/kg (Adjusted) Intravenous Q24H Marta Dillard MD Last Rate: Stopped (06/27/20 1600)       PRN Meds:   albuterol    aluminum-magnesium hydroxide-simethicone    ondansetron    Continuous Infusions:       Invasive Devices:      Invasive Devices     Peripheral Intravenous Line            Peripheral IV 06/25/20 Left Antecubital 2 days    Peripheral IV 06/27/20 Right Antecubital less than 1 day          Drain            External Urinary Catheter 2 days                  LABORATORY:    Results from last 7 days   Lab Units 06/28/20  0500 06/27/20  4615 06/27/20  0503 06/26/20  2023 06/26/20  0528 06/26/20  0152 06/25/20  1532   WBC Thousand/uL 14 87*  --  16 86*  --  23 63*  --  23 86*   HEMOGLOBIN g/dL 11 9  --  11 9  --  11 5  --  12 6   HEMATOCRIT % 37 9  --  36 8  --  35 8  --  39 2   PLATELETS Thousands/uL 287  --  268  --  257 252 290   POTASSIUM mmol/L 3 0* 3 4* 2 4* 3 4* 2 8*  --  3 5   CHLORIDE mmol/L 97* 101 102 103 104  --  101   CO2 mmol/L 35* 38* 36* 27 25  --  24   BUN mg/dL 34* 41* 47* 48* 52*  --  51*   CREATININE mg/dL 1 13 1 50* 1 40* 1 63* 1 91*  --  2 48*   CALCIUM mg/dL 8 1* 8 5 8 2* 8 0* 7 8*  --  8 1*   MAGNESIUM mg/dL  --   --  1 7  --  2 1  --   --    PHOSPHORUS mg/dL  --   --  2 2*  --  2 9  --   --       rest all reviewed    RADIOLOGY:  US right upper quadrant with liver dopplers   Final Result by Michael Matos MD (06/27 2035)      Normal liver and biliary tree  Patent hepatic vasculature with normal direction of blood flow  A single subcentimeter calcification within the gallbladder fundus is identified which could be a mural calcification or adherent calculus  No evidence of acute cholecystitis  Unremarkable right kidney  Known 5 mm nonobstructing calculus within the pelvis seen on recent CT is not well appreciated  Pancreas is obscured  Workstation performed: [id number]         XR foot 3+ vw right   Final Result by Jhonathan Cervantes DO (06/27 8623)   1  Age-indeterminate distal 4th metatarsal fracture, likely subacute  2   Soft tissue swelling surrounding the 3rd and 5th toes, most compatible with cellulitis  There are underlying bony changes as described above  Superimposed osteomyelitis not excluded  If there is continued concern for osteomyelitis, consider    follow-up MRI of the foot with gadolinium or nuclear medicine white blood cell scan  The study was marked in [facility] for immediate notification        Workstation performed: [id number]         CT head wo contrast   Final Result by Kyleigh Costa MD (06/25 2026)      No acute intracranial abnormality  Workstation performed: [id number]         CT chest abdomen pelvis wo contrast   Final Result by Kyleigh Costa MD (06/25 2037)      Bilateral centrally oriented alveolar opacities likely representing pulmonary edema though pneumonia is not entirely excluded  Small bilateral pleural effusions  Elevated right hemidiaphragm with associated right lung base atelectasis  No acute intra-abdominal abnormality  Nonobstructing right renal pelvis calculus  Workstation performed: [id number]         VAS upper limb venous duplex scan, unilateral/limited    (Results Pending)   VAS lower limb arterial duplex, complete bilateral    (Results Pending)   NM radrx in111 abscess localization limited    (Results Pending)     Rest all reviewed    Portions of the record may have been created with voice recognition software  Occasional wrong word or "sound a like" substitutions may have occurred due to the inherent limitations of voice recognition software  Read the chart carefully and recognize, using context, where substitutions have occurred  If you have any questions, please contact the dictating provider

## 2020-06-28 NOTE — PLAN OF CARE
Problem: OCCUPATIONAL THERAPY ADULT  Goal: Performs self-care activities at highest level of function for planned discharge setting  See evaluation for individualized goals  Description  Treatment Interventions: ADL retraining, Functional transfer training, Endurance training, Cognitive reorientation, Patient/family training, Equipment evaluation/education, Compensatory technique education, Continued evaluation, Energy conservation, Activityengagement          See flowsheet documentation for full assessment, interventions and recommendations  Note:   Limitation: Decreased ADL status, Decreased cognition, Decreased endurance, Decreased self-care trans, Decreased high-level ADLs  Prognosis: Fair  Assessment: Pt is a 66 y o  female admitted to [facility] on 6/25/2020 w/ acute hepatic enceophalopathy  Comorbidities include a h/o PHILIPPE, cardiomyopathy, hypothyroidism, hypokalemia, hypertension, transaminitis, and acute respiratory failure with hypoxia and hypercapnia  Pt with active OT orders and up with assistance  orders  Pt is an inconsistent historian  Per EMR, pt lives with her niece in a 2 story home with 1 MARIE  Pt reports being I w/  ADLS, receiving assistance with IADLS, (+) drove, & required no use of DME PTA  Currently pt is mod A for functional transfers, min A for functional mobility, mod A for UB ADLS and max A for LB ADLS  Pt is limited at this time 2*: pain, endurance, activity tolerance, functional mobility, forward functional reach, functional standing tolerance, decreased I w/ ADLS/IADLS and cognitive impairments  The following Occupational Performance Areas to address include: grooming, bathing/shower, toilet hygiene, dressing, functional mobility and clothing management  Based on the aforementioned OT evaluation, functional performance deficits, and assessments, pt has been identified as a high complexity evaluation  From OT standpoint, anticipate d/c STR   Pt to continue to benefit from acute immediate OT services to address the following goals 3-5x/week to  w/in 7-10 days:        OT Discharge Recommendation: Post-Acute Rehabilitation Services  OT - OK to Discharge: Yes(When medically appropriate)

## 2020-06-28 NOTE — ASSESSMENT & PLAN NOTE
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

## 2020-06-28 NOTE — ASSESSMENT & PLAN NOTE
Wt Readings from Last 3 Encounters:   06/27/20 79 kg (174 lb 2 6 oz)       · Volume overloaded at present  · Echo with EF 35%, segmental wall motion abnormality, mild aortic, mitral and tricuspid regurgitation  · Was on IV Lasix - changed to PO today   · Will need eventual ischemic workup  · Plavix 75 mg daily added by Cardiology as she is allergic to aspirin  · No statins due to transaminitis and elevated CK  · Continue metoprolol tartrate 100 mg q 12 hours  · No ACE/ARB due to acute kidney injury

## 2020-06-28 NOTE — ASSESSMENT & PLAN NOTE
· Fluid overloaded at present  · On Lasix 40 mg BID   · Discussed with Dr Shawn Ramirez - input appreciated

## 2020-06-28 NOTE — ASSESSMENT & PLAN NOTE
· Troponin - 11 - 10 - 8 - 8  · Non MI troponin elevation in the setting of renal insufficiency, hypoxia, volume overload  · Echo with EF of 35%  · Eventual ischemic evaluation per Cardiology  · On Plavix, BB

## 2020-06-28 NOTE — ASSESSMENT & PLAN NOTE
· BP acceptable  · Continue amlodipine 5 mg daily, furosemide 40 mg 2 times daily, metoprolol tartrate 100 mg q 12 hours

## 2020-06-28 NOTE — ASSESSMENT & PLAN NOTE
· Has broken screw in right 2nd toe which is asymptomatic  · Was scheduled to have it removed by Dr Verónica Porter   · Podiatry plans to call him after bone scan an Doppler results are available to discuss plan

## 2020-06-28 NOTE — PROGRESS NOTES
Cardiology Progress Note - Katie Dsouza 66 y o  female MRN: [id number]    Unit/Bed#: [facility] 724-01 Encounter: [id number]      Assessment:  Principal Problem:    Acute hepatic encephalopathy  Active Problems:    PHILIPPE (acute kidney injury) ([facility] [address] )    Elevated troponin    Transaminitis    Elevated INR    Leukocytosis    Acute respiratory failure with hypoxia and hypercapnia (HCC)    Cardiomyopathy    Poor social situation    Localized swelling on right hand    Acetaminophen toxicity    Hypokalemia    Encephalopathy    Suspected pneumonia    Foot ulcer ([facility] [address] )    Presence of retained hardware    Elevated CK    Hypertension    Hypothyroidism      Plan:  Patient with no significant issue overnight  She has no chest pain or significant dyspnea  BMP today with potassium of 3 0 and creatinine of 1 13  Potassium supplement already written  Nephrology note appreciated  Lasix dose reduced to 40 mg per day  Troponin elevation on admission was felt to be a type 2 Myocardial infarction in the setting of renal insufficiency and hypoxia  As well the patient had an elevated CPK from being down with a negative MB index  Echocardiogram however demonstrates cardiomyopathy with segmental wall motion abnormality and ejection fraction of 35%  This is possibly stress related but will need ischemic heart disease workup at some point  Holding ACE-inhibitor for now  Subjective:   Patient seen and examined  No significant events overnight   negative  Objective:     Vitals: Blood pressure 138/61, pulse 73, temperature 98 6 °F (37 °C), resp  rate 18, height 5' (1 524 m), weight 79 2 kg (174 lb 9 7 oz), SpO2 (!) 87 %  , Body mass index is 34 1 kg/m² ,   Orthostatic Blood Pressures      Most Recent Value   Blood Pressure  138/61 filed at 06/28/2020 3930   Patient Position - Orthostatic VS  Standing - Orthostatic VS filed at 06/26/2020 0729      ,      Intake/Output Summary (Last 24 hours) at 6/28/2020 1033  Last data filed at 6/28/2020 4170  Gross per 24 hour   Intake 2015 72 ml   Output 3530 ml   Net -1514 28 ml       No significant arrhythmias seen on telemetry review  Physical Exam:    GEN: Mabeline Daisy  NECK: supple, no carotid bruits, no JVD or HJR  HEART: normal rate, regular rhythm, normal S1 and S2, no murmurs, clicks, gallops or rubs   LUNGS: clear to auscultation bilaterally; no wheezes, rales, or rhonchi   Diminished breath sounds at the bases  ABDOMEN: normal bowel sounds, soft, no tenderness, no distention  EXTREMITIES: peripheral pulses normal; no clubbing, cyanosis, or edema  SKIN: warm and well perfused, no suspicious lesions on exposed skin    Labs & Results:    No results displayed because visit has over 200 results  Ct Chest Abdomen Pelvis Wo Contrast    Result Date: 6/25/2020  Narrative: CT CHEST, ABDOMEN AND PELVIS WITHOUT IV CONTRAST INDICATION:   found down, hypoxic  COMPARISON:  None  TECHNIQUE: CT examination of the chest, abdomen and pelvis was performed without intravenous contrast   Axial, sagittal, and coronal 2D reformatted images were created from the source data and submitted for interpretation  Radiation dose length product (DLP) for this visit:  1097 17 mGy-cm   This examination, like all CT scans performed in the [facility], was performed utilizing techniques to minimize radiation dose exposure, including the use of iterative reconstruction and automated exposure control  Enteric contrast was administered  FINDINGS: CHEST LUNGS:  Bilateral centrally oriented alveolar opacities are seen likely representing pulmonary edema although pneumonia is not entirely excluded  Elevated right hemidiaphragm is noted  Associated right lung base atelectasis is present  There is no tracheal or endobronchial lesion  PLEURA:  Small bilateral pleural effusions are present  HEART/GREAT VESSELS:  Unremarkable for patient's age  MEDIASTINUM AND SINA:  Unremarkable   CHEST WALL AND LOWER NECK: Unremarkable  ABDOMEN LIVER/BILIARY TREE:  Unremarkable  GALLBLADDER:  No calcified gallstones  No pericholecystic inflammatory change  SPLEEN:  Unremarkable  PANCREAS:  Unremarkable  ADRENAL GLANDS:  Unremarkable  KIDNEYS/URETERS:  5 mm nonobstructing right renal pelvis calculus is identified  Left kidney is unremarkable  STOMACH AND BOWEL:  Large volume of rectal stool is present  APPENDIX:  No findings to suggest appendicitis  ABDOMINOPELVIC CAVITY:  No ascites  No pneumoperitoneum  No lymphadenopathy  VESSELS:  Unremarkable for patient's age  PELVIS REPRODUCTIVE ORGANS:  Surgical changes of prior hysterectomy  URINARY BLADDER:  Unremarkable  ABDOMINAL WALL/INGUINAL REGIONS:  Unremarkable  OSSEOUS STRUCTURES:  No acute fracture or destructive osseous lesion  Impression: Bilateral centrally oriented alveolar opacities likely representing pulmonary edema though pneumonia is not entirely excluded  Small bilateral pleural effusions  Elevated right hemidiaphragm with associated right lung base atelectasis  No acute intra-abdominal abnormality  Nonobstructing right renal pelvis calculus  Workstation performed: [id number]     Xr Foot 3+ Vw Right    Result Date: 6/27/2020  Narrative: RIGHT FOOT INDICATION:   edema and wound, patient states she has pin which was to be removed, poor historian  COMPARISON:  None VIEWS:  XR FOOT 3+ VW RIGHT Images: 3 FINDINGS: The osseous structures are demineralized  There is an age-indeterminate fracture involving the distal 4th phalanx, proximal to the 4th metatarsal head  Findings appear subacute  There are postoperative changes involving the toes  Prior osteotomy and bunionectomy 1st metatarsal   Fractured cerclage wire base of 1st proximal phalanx medially  Fusion of the 2nd toe with a screw traversing the 2nd proximal, middle and distal phalanges  There is fracture of the screw at the level of the 2nd proximal interphalangeal joint   There is fusion of the 3rd toe with a screw traversing the proximal, middle and distal phalanges  There is lucency surrounding the screw, suggesting loosening or infection  There has been prior screw fixation of the 4th toe  A residual screw fragment is present in the 4th proximal phalanx  There has been prior fixation of the 5th toe  The hardware has been removed  There is cortical irregularity involving the tuft of the 5th distal phalanx  Degenerative changes 1st and 2nd metatarsophalangeal joints  Degenerative changes in the midfoot and hindfoot  Plantar calcaneal spur  No lytic or blastic osseous lesion  Soft tissue swelling surrounding the 3rd and 5th toes  Impression: 1  Age-indeterminate distal 4th metatarsal fracture, likely subacute  2   Soft tissue swelling surrounding the 3rd and 5th toes, most compatible with cellulitis  There are underlying bony changes as described above  Superimposed osteomyelitis not excluded  If there is continued concern for osteomyelitis, consider follow-up MRI of the foot with gadolinium or nuclear medicine white blood cell scan  The study was marked in [facility] for immediate notification  Workstation performed: [id number]     Ct Head Wo Contrast    Result Date: 6/25/2020  Narrative: CT BRAIN - WITHOUT CONTRAST INDICATION:   ams  COMPARISON:  None  TECHNIQUE:  CT examination of the brain was performed  In addition to axial images, coronal 2D reformatted images were created and submitted for interpretation  Radiation dose length product (DLP) for this visit:  862 05 mGy-cm   This examination, like all CT scans performed in the [facility], was performed utilizing techniques to minimize radiation dose exposure, including the use of iterative  reconstruction and automated exposure control  IMAGE QUALITY:  Diagnostic  FINDINGS: PARENCHYMA:  No intracranial mass, mass effect or midline shift  No CT signs of acute infarction  No acute parenchymal hemorrhage   VENTRICLES AND EXTRA-AXIAL SPACES: Normal for the patient's age  VISUALIZED ORBITS AND PARANASAL SINUSES:  Unremarkable  CALVARIUM AND EXTRACRANIAL SOFT TISSUES:  Normal      Impression: No acute intracranial abnormality  Workstation performed: [id number]     Us Right Upper Quadrant With Liver Dopplers    Result Date: 6/27/2020  Narrative: RIGHT UPPER QUADRANT ULTRASOUND WITH LIVER DOPPLER INDICATION:     Transaminitis  COMPARISON:  CT 6/25/2020 TECHNIQUE:   Real-time ultrasound of the right upper quadrant was performed with a curvilinear transducer with both volumetric sweeps and still imaging techniques  FINDINGS: PANCREAS:  Obscured by bowel gas  AORTA AND IVC:  Visualized portions are normal for patient age  LIVER: Size:  Within normal range  Contour:  Surface contour is smooth  Parenchyma:  Echogenicity and echotexture are within normal limits  No evidence of suspicious mass  LIVER DOPPLER: The main portal vein and primary branch segments are patent and hepatopetal with normal spectral waveform  Hepatic veins are patent  Spectral waveforms within normal limits  Main hepatic artery appears normal size, patent with normal spectral waveform  BILIARY: The gallbladder is normal in caliber  No wall thickening or pericholecystic fluid  A single subcentimeter calcification within the gallbladder fundus is identified which could be a mural calcification or adherent calculus  No sonographic Pina's sign  No intrahepatic biliary dilatation  CBD measures 6 mm  No choledocholithiasis  KIDNEY: Right kidney measures 10 0 x 5 2 cm  No hydronephrosis  Known 5 mm calculus within the renal pelvis is not well appreciated  ASCITES:   None  Impression: Normal liver and biliary tree  Patent hepatic vasculature with normal direction of blood flow  A single subcentimeter calcification within the gallbladder fundus is identified which could be a mural calcification or adherent calculus  No evidence of acute cholecystitis  Unremarkable right kidney    Known 5 mm nonobstructing calculus within the pelvis seen on recent CT is not well appreciated  Pancreas is obscured  Workstation performed: [id number]       EKG personally reviewed by John Lay MD      Counseling / Coordination of Care  Total floor / unit time spent today 30 minutes  Greater than 50% of total time was spent with the patient and / or family counseling and / or coordination of care

## 2020-06-28 NOTE — PROGRESS NOTES
Progress Note - Katie Durbin 1945, 66 y o  female MRN: [id number]    Unit/Bed#: [facility] 724-01 Encounter: [id number]    Primary Care Provider: No primary care provider on file  Date and time admitted to hospital: 6/25/2020  3:20 PM        * Acute hepatic encephalopathy  Assessment & Plan  · Patient was found unresponsive   · Hepatic encephalopathy likely due to chronic acetaminophen toxicity  · S/p NAC   · Awake and alert at present  · Transaminases improving  · INR - 1 21  · F/u RUQ US with dopplers, EBV, CMV, ceruloplasmin, anti-smooth muscle antibody, Anti LKM, ARMAND  · No hepatotoxic medications    Cardiomyopathy  Assessment & Plan  Wt Readings from Last 3 Encounters:   06/27/20 79 kg (174 lb 2 6 oz)       · Volume overloaded at present  · Echo with EF 35%, segmental wall motion abnormality, mild aortic, mitral and tricuspid regurgitation  · Was on IV Lasix - changed to PO today   · Will need eventual ischemic workup  · Plavix 75 mg daily added by Cardiology as she is allergic to aspirin  · No statins due to transaminitis and elevated CK  · Continue metoprolol tartrate 100 mg q 12 hours  · No ACE/ARB due to acute kidney injury    PHILIPPE (acute kidney injury) ([facility] [address] )  Assessment & Plan  · Fluid overloaded at present  · On Lasix 40 mg BID   · Discussed with Dr Niki Loving - input appreciated       Suspected pneumonia  Assessment & Plan  · CT chest with bilateral centrally oriented alveolar opacities   · Opacities mainly due to volume overload however has elevated procalcitonin and leukocytosis  · Was found unresponsive - concern for aspiration  · Cefepime/vancomycin/metronidazole  · Sputum culture, urine Legionella, strep pneumo antigen, MRSA swab  · Follow-up blood cultures  · Monitor temperature, WBC  · Trend procalcitonin    Foot ulcer (HCC)  Assessment & Plan  · Ulceration which probes to bone noted over the dorsal aspect of the right 3rd digit  · Bone scan and lower extremity arterial Dopplers ordered by Podiatry - input appreciated - follow-up results    Presence of retained hardware  Assessment & Plan  · Has broken screw in right 2nd toe which is asymptomatic  · Was scheduled to have it removed by Dr Abril Pink   · Podiatry plans to call him after bone scan an Doppler results are available to discuss plan      Acute respiratory failure with hypoxia and hypercapnia (HCC)  Assessment & Plan  · Due to acute CHF  · Wean O2 with diuresis    Elevated troponin  Assessment & Plan  · Troponin - 11 - 10 - 8 - 8  · Non MI troponin elevation in the setting of renal insufficiency, hypoxia, volume overload  · Echo with EF of 35%  · Eventual ischemic evaluation per Cardiology  · On Plavix, BB      Elevated CK  Assessment & Plan  · Improved to 281 from 1194  · No IV fluids as volume overloaded  · Monitor    Hypertension  Assessment & Plan  · BP acceptable  · Continue amlodipine 5 mg daily, furosemide 40 mg 2 times daily, metoprolol tartrate 100 mg q 12 hours    Hypothyroidism  Assessment & Plan  · TSH normal  · Continue levothyroxine 125 mcg daily    Hypokalemia  Assessment & Plan  · Replete      VTE Pharmacologic Prophylaxis:   Pharmacologic: Heparin  Mechanical VTE Prophylaxis in Place: Yes    Patient Centered Rounds: I have performed bedside rounds with nursing staff today  Education and Discussions with Family / Patient:  Discussed with daughter on the phone    Time Spent for Care: 20 minutes  More than 50% of total time spent on counseling and coordination of care as described above  Current Length of Stay: 2 day(s)    Current Patient Status: Inpatient   Certification Statement: The patient will continue to require additional inpatient hospital stay due to Hepatic encephalopathy, volume overload, possible osteomyelitis of foot    Code Status: Level 1 - Full Code    Subjective:   Awake and alert at present  No fever  Intermittent cough    No shortness of breath at rest      Objective:     Vitals:   Temp (24hrs), Av °F (36 1 °C), Min:96 4 °F (35 8 °C), Max:97 4 °F (36 3 °C)    Temp:  [96 4 °F (35 8 °C)-97 4 °F (36 3 °C)] 97 4 °F (36 3 °C)  HR:  [63-72] 71  Resp:  [16-18] 16  BP: ()/(38-64) 130/58  SpO2:  [94 %-98 %] 94 %  Body mass index is 34 01 kg/m²  Physical Exam:     Physical Exam   Constitutional: She is oriented to person, place, and time  HENT:   Head: Normocephalic and atraumatic  Eyes: Pupils are equal, round, and reactive to light  EOM are normal    Neck: Normal range of motion  Neck supple  Cardiovascular: Normal rate and regular rhythm  Pulmonary/Chest: Effort normal and breath sounds normal    Abdominal: Soft  Bowel sounds are normal    Musculoskeletal: She exhibits edema  Neurological: She is alert and oriented to person, place, and time  Skin:   Right 3rd digit ulcer       Additional Data:     Labs:    Results from last 7 days   Lab Units 06/27/20  0503 06/26/20  0528   WBC Thousand/uL 16 86* 23 63*   HEMOGLOBIN g/dL 11 9 11 5   HEMATOCRIT % 36 8 35 8   PLATELETS Thousands/uL 268 257   NEUTROS PCT %  --  84*   LYMPHS PCT %  --  7*   MONOS PCT %  --  8   EOS PCT %  --  0     Results from last 7 days   Lab Units 06/27/20  1435 06/27/20  0503   SODIUM mmol/L 144 145   POTASSIUM mmol/L 3 4* 2 4*   CHLORIDE mmol/L 101 102   CO2 mmol/L 38* 36*   BUN mg/dL 41* 47*   CREATININE mg/dL 1 50* 1 40*   ANION GAP mmol/L 5 7   CALCIUM mg/dL 8 5 8 2*   ALBUMIN g/dL  --  3 0*   TOTAL BILIRUBIN mg/dL  --  0 77   ALK PHOS U/L  --  93   ALT U/L  --  1,096*   AST U/L  --  604*   GLUCOSE RANDOM mg/dL 171* 116     Results from last 7 days   Lab Units 06/27/20  1435   INR  1 21*     Results from last 7 days   Lab Units 06/25/20  1531   POC GLUCOSE mg/dl 160*         Results from last 7 days   Lab Units 06/26/20  2130 06/26/20  0903 06/25/20  1844 06/25/20  1649   LACTIC ACID mmol/L  --   --  1 9 3 8*   PROCALCITONIN ng/ml 6 24* 8 81*  --   --            * I Have Reviewed All Lab Data Listed Above    * Additional Pertinent Lab Tests Reviewed: [address] Admission Reviewed      Recent Cultures (last 7 days):     Results from last 7 days   Lab Units 06/27/20  1104 06/26/20  1121 06/26/20  1112   BLOOD CULTURE   --  No Growth at 24 hrs  No Growth at 24 hrs  LEGIONELLA URINARY ANTIGEN  Negative  --   --        Last 24 Hours Medication List:     Current Facility-Administered Medications:  albuterol 2 5 mg Nebulization Q4H PRN Viola Maxwell MD    aluminum-magnesium hydroxide-simethicone 5 mL Oral Q6H PRN Amanda Martinez MD    amLODIPine 5 mg Oral Daily Amanda Martinez MD    cefepime 1,000 mg Intravenous Q12H Viola Maxwell MD Last Rate: 1,000 mg (06/27/20 1223)   clopidogrel 75 mg Oral Daily Marcelino Bravo MD    furosemide 40 mg Oral BID (diuretic) Jory Coto MD    heparin (porcine) 5,000 Units Subcutaneous [facility] Amanda Martinez MD    levothyroxine 125 mcg Oral Early Morning Amanda Martinez MD    melatonin 3 mg Oral HS Amanda Martinez MD    metoprolol tartrate 100 mg Oral Q12H [address] Amanda Martinez MD    metroNIDAZOLE 500 mg Oral [facility] Viola Maxwell MD    ondansetron 4 mg Intravenous Q6H PRN Amanda Martinez MD    phytonadione 5 mg Intravenous Daily Dileep Harley MD Last Rate: 5 mg (06/27/20 0934)   potassium chloride 20 mEq Intravenous Q2H Jory Coto MD Last Rate: 20 mEq (06/27/20 1954)   vancomycin 20 mg/kg (Adjusted) Intravenous Q24H Viola Maxwell MD Last Rate: Stopped (06/27/20 1600)        Today, Patient Was Seen By: Viola Maxwell MD    ** Please Note: Dictation voice to text software may have been used in the creation of this document   **

## 2020-06-28 NOTE — OCCUPATIONAL THERAPY NOTE
Occupational Therapy Evaluation     Patient Name: Solitario Moreno  [id number] Date: 6/28/2020  Problem List  Principal Problem:    Acute hepatic encephalopathy  Active Problems:    PHILIPPE (acute kidney injury) ([address] )    Elevated troponin    Transaminitis    Elevated INR    Leukocytosis    Acute respiratory failure with hypoxia and hypercapnia (HCC)    Cardiomyopathy    Poor social situation    Localized swelling on right hand    Acetaminophen toxicity    Hypokalemia    Encephalopathy    Suspected pneumonia    Foot ulcer ([address] )    Presence of retained hardware    Elevated CK    Hypertension    Hypothyroidism    Past Medical History  Past Medical History:   Diagnosis Date    Disease of thyroid gland     Hyperlipemia     Hypertension      Past Surgical History  History reviewed  No pertinent surgical history  06/28/20 1049   Note Type   Note type Eval only   Restrictions/Precautions   Weight Bearing Precautions Per Order No   Other Precautions Cognitive; Bed Alarm; Chair Alarm;Multiple lines; Fall Risk;Pain;O2   Pain Assessment   Pain Assessment Tool 0-10   Pain Score No Pain   Home Living   Type of [address] [place] [address] Two level;Stairs to enter with rails   Bathroom Shower/Tub Tub/shower unit   Bathroom Toilet Standard   Bathroom Equipment   (dengenie)   [address]   (tam)   Additional Comments Pt is an inconsistent historian  Per EMR, pt lives with her niece in a 2 story home with 1 MARIE  Prior Function   Level of [place] Independent with ADLs and functional mobility; Needs assistance with IADLs   Lives With Family  (niece)   Receives Help From Family   ADL Assistance Independent   IADLs Needs assistance   Falls in the last 6 months 0  (per pt report)   Vocational Retired   Lifestyle   Autonomy Pt reports being I with ADLS, receiving assistance with IADL, and ambulating without device PTA  (+)    Reciprocal Relationships Pt lives with her niece   Unclear what level of support she is able to provide  Service to Others Retired, reports she would like to get a part time job   Intrinsic Gratification Enjoys reading   ADL   Where Assessed Edge of bed   Eating Assistance 7  Independent   Grooming Assistance 5  Supervision/Setup   UB [address] 3  Moderate Assistance   LB [address] 2  [address] [facility] [place] 3  Moderate Assistance    [facility] 2  Maximal [address] [place] [address]  2  Maximal Assistance   Bed Mobility   Supine to Sit 3  Moderate assistance   Additional items Assist x 1; Increased time required;Verbal cues;LE management   Additional Comments After OT session pt OOB in chair with alarm on and all needs within reach  Transfers   Sit to Stand 3  Moderate assistance   Additional items Assist x 1; Increased time required;Verbal cues   Stand to Sit 3  Moderate assistance   Additional items Assist x 1; Increased time required;Verbal cues   Toilet transfer 3  Moderate assistance   Additional items Assist x 2; Increased time required;Verbal cues   Functional Mobility   Functional Mobility 4  Minimal assistance   Additional Comments Pt demonstrated short mobility within room  Pt requires some VC for safety throughout  Additional items Rolling walker   Balance   Static Sitting Fair -   Dynamic Sitting Poor +   Static Standing Poor +   Dynamic Standing Poor +   Ambulatory Poor +   Activity Tolerance   Activity Tolerance Patient limited by fatigue   Nurse Made Aware RN confirmed okay to see pt   Cognition   Overall Cognitive Status Impaired   Arousal/Participation Arousable; Cooperative   Attention Attends with cues to redirect   Orientation Level Oriented X4   Memory Decreased recall of precautions   Following Commands Follows one step commands with increased time or repetition   Comments Pt presents with flat affect but overall pleasant and cooperative  Pt requires VC for safety and correct technique throughout  Assessment   Limitation Decreased ADL status; Decreased cognition;Decreased endurance;Decreased self-care trans;Decreased high-level ADLs   Prognosis Fair   Assessment Pt is a 66 y o  female admitted to [facility] on 2020 w/ acute hepatic enceophalopathy  Comorbidities include a h/o PHILIPPE, cardiomyopathy, hypothyroidism, hypokalemia, hypertension, transaminitis, and acute respiratory failure with hypoxia and hypercapnia  Pt with active OT orders and up with assistance  orders  Pt is an inconsistent historian  Per EMR, pt lives with her niece in a 2 story home with 1 MARIE  Pt reports being I w/  ADLS, receiving assistance with IADLS, (+) drove, & required no use of DME PTA  Currently pt is mod A for functional transfers, min A for functional mobility, mod A for UB ADLS and max A for LB ADLS  Pt is limited at this time 2*: pain, endurance, activity tolerance, functional mobility, forward functional reach, functional standing tolerance, decreased I w/ ADLS/IADLS and cognitive impairments  The following Occupational Performance Areas to address include: grooming, bathing/shower, toilet hygiene, dressing, functional mobility and clothing management  Based on the aforementioned OT evaluation, functional performance deficits, and assessments, pt has been identified as a high complexity evaluation  From OT standpoint, anticipate d/c STR  Pt to continue to benefit from acute immediate OT services to address the following goals 3-5x/week to  w/in 7-10 days: Erick Olea Goals   Patient Goals To return home    LTG Time Frame 7-10   Long Term Goal #1 See goals below   Plan   Treatment Interventions ADL retraining;Functional transfer training; Endurance training;Cognitive reorientation;Patient/family training;Equipment evaluation/education; Compensatory technique education;Continued evaluation; Energy conservation; Activityengagement   Goal Expiration Date 20   OT Frequency 3-5x/wk   Recommendation   OT Discharge Recommendation Post-Acute Rehabilitation Services   OT - OK to Discharge Yes  (When medically appropriate)   Modified Carlton Scale   Modified Carlton Scale 4     GOALS    1) Pt will increase activity tolerance to G for 30 min txment sessions    2) Pt will complete UB/LB dressing/self care w/ mod I using adaptive device and DME as needed    3) Pt will complete bathing w/ Mod I w/ use of AE and DME as needed    4) Pt will complete toileting w/ mod I w/ G hygiene/thoroughness using DME as needed    5) Pt will improve functional transfers to Mod I on/off all surfaces using DME as needed w/ G balance/safety     6) Pt will improve functional mobility during ADL/IADL/leisure tasks to Mod I using DME as needed w/ G balance/safety     7) Pt will participate in simulated IADL management task with DME as needed to increase independence to  w/ G safety and endurance    8) Pt will demonstrate G carryover of pt/caregiver education and training as appropriate  9) Pt will demonstrate 100% carryover of energy conservation techniques t/o functional I/ADL/leisure tasks w/o cues s/p skilled education    10) Pt will independently identify and utilize 2-3 coping strategies to increase positive affect and promote overall well-being      11) Pt will engage in ongoing cognitive assessment w/ G participation to assist w/ safe d/c planning/recommendations (as needed)    BAL Gutierrez, OTR/L

## 2020-06-28 NOTE — PROGRESS NOTES
Vancomycin IV Pharmacy-to-Dose Consultation    Adrian Richard is a 66 y o  female who is currently receiving Vancomycin IV with management by the Pharmacy Consult service for the treatment of skin-soft tissue infection, osteomyelitis, MRSA suspected, Pneumonia  (also on Metro + Cefepime 1g q12h)  Assessment/Plan:    The patient's chart was reviewed  Renal Function: unknown baseline; SCr has significantly improved from admit of 2 48>>1 4>1 13 (CrCl 30s); UOP adequate  There are no signs or symptoms of nephrotoxicity and/or infusion reactions documented  The following nephrotoxicity factors are present:  Medications: diuretics, IV contrast  Patient-Factors: elderly, renal dysfunction    Based on todays assessment, will continue current vancomycin (Day # 2) dosing of 1250mg (20mg/kg AdjBW) IV Q24H, with a plan for trough to be drawn at 1400 on 6/30 (Tue)  Pharmacy will continue to follow closely for s/sx of nephrotoxicity, infusion reactions and appropriateness of therapy  BMP and CBC will be ordered per protocol  We will continue to follow the patients culture results and clinical progress daily  Chinmay Dooley, Pharm  D , [facility]  Clinical Pharmacist, [address]  [phone] or Kiera

## 2020-06-28 NOTE — ASSESSMENT & PLAN NOTE
· Ulceration which probes to bone noted over the dorsal aspect of the right 3rd digit  · Bone scan and lower extremity arterial Dopplers ordered by Podiatry - input appreciated - follow-up results

## 2020-06-29 ENCOUNTER — APPOINTMENT (INPATIENT)
Dept: NON INVASIVE DIAGNOSTICS | Facility: HOSPITAL | Age: 75
DRG: 441 | End: 2020-06-29
Payer: MEDICARE

## 2020-06-29 ENCOUNTER — APPOINTMENT (INPATIENT)
Dept: RADIOLOGY | Facility: HOSPITAL | Age: 75
DRG: 441 | End: 2020-06-29
Payer: MEDICARE

## 2020-06-29 LAB
ALBUMIN SERPL BCP-MCNC: 3 G/DL (ref 3.5–5)
ALP SERPL-CCNC: 102 U/L (ref 46–116)
ALT SERPL W P-5'-P-CCNC: 494 U/L (ref 12–78)
ANION GAP SERPL CALCULATED.3IONS-SCNC: 6 MMOL/L (ref 4–13)
AST SERPL W P-5'-P-CCNC: 132 U/L (ref 5–45)
BILIRUB DIRECT SERPL-MCNC: 0.41 MG/DL (ref 0–0.2)
BILIRUB SERPL-MCNC: 1.06 MG/DL (ref 0.2–1)
BUN SERPL-MCNC: 21 MG/DL (ref 5–25)
CALCIUM SERPL-MCNC: 8.1 MG/DL (ref 8.3–10.1)
CHLORIDE SERPL-SCNC: 92 MMOL/L (ref 100–108)
CK SERPL-CCNC: 62 U/L (ref 26–192)
CO2 SERPL-SCNC: 39 MMOL/L (ref 21–32)
CREAT SERPL-MCNC: 0.88 MG/DL (ref 0.6–1.3)
GFR SERPL CREATININE-BSD FRML MDRD: 64 ML/MIN/1.73SQ M
GLUCOSE SERPL-MCNC: 146 MG/DL (ref 65–140)
INR PPP: 1.19 (ref 0.84–1.19)
MAGNESIUM SERPL-MCNC: 1.4 MG/DL (ref 1.6–2.6)
POTASSIUM SERPL-SCNC: 2.8 MMOL/L (ref 3.5–5.3)
PROCALCITONIN SERPL-MCNC: 1.13 NG/ML
PROT SERPL-MCNC: 7.2 G/DL (ref 6.4–8.2)
PROTHROMBIN TIME: 15.1 SECONDS (ref 11.6–14.5)
RYE IGE QN: NEGATIVE
SODIUM SERPL-SCNC: 137 MMOL/L (ref 136–145)

## 2020-06-29 PROCEDURE — 85610 PROTHROMBIN TIME: CPT | Performed by: INTERNAL MEDICINE

## 2020-06-29 PROCEDURE — 78800 RP LOCLZJ TUM 1 AREA 1 D IMG: CPT

## 2020-06-29 PROCEDURE — 99232 SBSQ HOSP IP/OBS MODERATE 35: CPT | Performed by: INTERNAL MEDICINE

## 2020-06-29 PROCEDURE — 99233 SBSQ HOSP IP/OBS HIGH 50: CPT | Performed by: INTERNAL MEDICINE

## 2020-06-29 PROCEDURE — 99223 1ST HOSP IP/OBS HIGH 75: CPT | Performed by: NURSE PRACTITIONER

## 2020-06-29 PROCEDURE — 80076 HEPATIC FUNCTION PANEL: CPT | Performed by: INTERNAL MEDICINE

## 2020-06-29 PROCEDURE — 83735 ASSAY OF MAGNESIUM: CPT | Performed by: INTERNAL MEDICINE

## 2020-06-29 PROCEDURE — 93922 UPR/L XTREMITY ART 2 LEVELS: CPT | Performed by: SURGERY

## 2020-06-29 PROCEDURE — 94760 N-INVAS EAR/PLS OXIMETRY 1: CPT

## 2020-06-29 PROCEDURE — 93925 LOWER EXTREMITY STUDY: CPT

## 2020-06-29 PROCEDURE — 93971 EXTREMITY STUDY: CPT

## 2020-06-29 PROCEDURE — 93971 EXTREMITY STUDY: CPT | Performed by: SURGERY

## 2020-06-29 PROCEDURE — A9570 INDIUM IN-111 AUTO WBC: HCPCS

## 2020-06-29 PROCEDURE — 93923 UPR/LXTR ART STDY 3+ LVLS: CPT

## 2020-06-29 PROCEDURE — 93925 LOWER EXTREMITY STUDY: CPT | Performed by: SURGERY

## 2020-06-29 PROCEDURE — 80048 BASIC METABOLIC PNL TOTAL CA: CPT | Performed by: INTERNAL MEDICINE

## 2020-06-29 PROCEDURE — 82550 ASSAY OF CK (CPK): CPT | Performed by: INTERNAL MEDICINE

## 2020-06-29 PROCEDURE — 84145 PROCALCITONIN (PCT): CPT | Performed by: INTERNAL MEDICINE

## 2020-06-29 RX ORDER — METOPROLOL TARTRATE 50 MG/1
100 TABLET, FILM COATED ORAL EVERY 12 HOURS SCHEDULED
Status: DISCONTINUED | OUTPATIENT
Start: 2020-06-29 | End: 2020-06-29

## 2020-06-29 RX ORDER — POTASSIUM CHLORIDE 20 MEQ/1
40 TABLET, EXTENDED RELEASE ORAL 2 TIMES DAILY
Status: COMPLETED | OUTPATIENT
Start: 2020-06-29 | End: 2020-06-29

## 2020-06-29 RX ORDER — FUROSEMIDE 40 MG/1
40 TABLET ORAL DAILY
Status: DISCONTINUED | OUTPATIENT
Start: 2020-06-30 | End: 2020-07-03 | Stop reason: HOSPADM

## 2020-06-29 RX ORDER — METOPROLOL TARTRATE 50 MG/1
50 TABLET, FILM COATED ORAL EVERY 12 HOURS SCHEDULED
Status: DISCONTINUED | OUTPATIENT
Start: 2020-06-29 | End: 2020-06-30

## 2020-06-29 RX ORDER — MAGNESIUM SULFATE HEPTAHYDRATE 40 MG/ML
2 INJECTION, SOLUTION INTRAVENOUS ONCE
Status: COMPLETED | OUTPATIENT
Start: 2020-06-29 | End: 2020-06-29

## 2020-06-29 RX ORDER — POTASSIUM CHLORIDE 20 MEQ/1
40 TABLET, EXTENDED RELEASE ORAL DAILY
Status: DISCONTINUED | OUTPATIENT
Start: 2020-06-30 | End: 2020-07-03 | Stop reason: HOSPADM

## 2020-06-29 RX ADMIN — METRONIDAZOLE 500 MG: 500 TABLET ORAL at 05:24

## 2020-06-29 RX ADMIN — METOPROLOL TARTRATE 50 MG: 50 TABLET, FILM COATED ORAL at 22:25

## 2020-06-29 RX ADMIN — HEPARIN SODIUM 5000 UNITS: 5000 INJECTION INTRAVENOUS; SUBCUTANEOUS at 22:25

## 2020-06-29 RX ADMIN — POTASSIUM CHLORIDE 40 MEQ: 1500 TABLET, EXTENDED RELEASE ORAL at 11:08

## 2020-06-29 RX ADMIN — METRONIDAZOLE 500 MG: 500 TABLET ORAL at 14:14

## 2020-06-29 RX ADMIN — HEPARIN SODIUM 5000 UNITS: 5000 INJECTION INTRAVENOUS; SUBCUTANEOUS at 14:14

## 2020-06-29 RX ADMIN — HEPARIN SODIUM 5000 UNITS: 5000 INJECTION INTRAVENOUS; SUBCUTANEOUS at 05:24

## 2020-06-29 RX ADMIN — CEFEPIME HYDROCHLORIDE 1000 MG: 1 INJECTION, POWDER, FOR SOLUTION INTRAMUSCULAR; INTRAVENOUS at 11:08

## 2020-06-29 RX ADMIN — MAGNESIUM SULFATE HEPTAHYDRATE 2 G: 40 INJECTION, SOLUTION INTRAVENOUS at 14:13

## 2020-06-29 RX ADMIN — METRONIDAZOLE 500 MG: 500 TABLET ORAL at 22:25

## 2020-06-29 RX ADMIN — POTASSIUM CHLORIDE 40 MEQ: 1500 TABLET, EXTENDED RELEASE ORAL at 17:06

## 2020-06-29 RX ADMIN — MELATONIN 6 MG: at 22:32

## 2020-06-29 RX ADMIN — CLOPIDOGREL BISULFATE 75 MG: 75 TABLET ORAL at 09:00

## 2020-06-29 RX ADMIN — LEVOTHYROXINE SODIUM 125 MCG: 125 TABLET ORAL at 05:24

## 2020-06-29 NOTE — PROGRESS NOTES
Progress Note- Dayan Husain 66 y o  female MRN: [id number]  Unit/Bed#: [facility] 724-01 Encounter: [id number]    Assessment and Plan:  Dayan Husain is a 66 y o  female with PMH:  HTN, depression, hypothyroidism who presented for altered mental status found to have acute hepatic encephalopathy in setting of transaminitis  #Acute Hepatic Encephalopathy   #Transaminitis - Improving  Patient with significant improvement in acute encephalopathy  Suspect patient with acute hepatic encephalopathy in the setting of acute transaminitis which continues to drastically improve, INR now normal  Patient is status post 3 days of vitamin K therapy as well as NAC therapy  Primary etiology for acute transaminitis is likely acetaminophen toxicity  Patient had a right upper quadrant ultrasound with Dopplers which was normal with no evidence of portal vein thrombus  Patient will have continued outpatient workup and monitoring of liver function  In setting of obesity she may also have a component of SMALL complicating her liver involvement  Patient with no evidence of acute liver failure given improvement  Plan:   -chronic work-up   -f/u Fe panel, alpha-1, HFE gene   ARMAND normal, Ceruloplasmin normal, LKM negative  -CMV IgM normal, EBV IgM negative   -CMP, INR qdaily    -Patient is not transplant candidate at this time given her age  -continue neurological checks  -avoid hepatotoxic medications  -repeat CT head if encephalopathy worsens   ______________________________________________________________________    Subjective:     Patient's states she feels like her mentation is improved  She is less confused and she was previously  Denies any abdominal pain at this time  Patient thankful for care       Medication Administration - last 24 hours from 06/28/2020 1749 to 06/29/2020 1749       Date/Time Order Dose Route Action Action by     06/29/2020 0903 amLODIPine (NORVASC) tablet 5 mg 5 mg Oral Not Given Ainsley Durham RN     06/29/2020 8409 levothyroxine tablet 125 mcg 125 mcg Oral Given Alden Carrera RN     06/29/2020 0914 metoprolol tartrate (LOPRESSOR) tablet 100 mg 100 mg Oral Not Given Muna Holbrook RN     06/28/2020 2159 metoprolol tartrate (LOPRESSOR) tablet 100 mg 100 mg Oral Given Alden Carrera RN     06/29/2020 1414 heparin (porcine) subcutaneous injection 5,000 Units 5,000 Units Subcutaneous Given Muna Holbrook RN     06/29/2020 0524 heparin (porcine) subcutaneous injection 5,000 Units 5,000 Units Subcutaneous Given Alden Carrera RN     06/28/2020 2159 heparin (porcine) subcutaneous injection 5,000 Units 5,000 Units Subcutaneous Given Alden Carrera RN     06/29/2020 1145 cefepime (MAXIPIME) 1,000 mg in dextrose 5 % 50 mL IVPB 0 mg Intravenous Stopped Muna Holbrook RN     06/29/2020 1108 cefepime (MAXIPIME) 1,000 mg in dextrose 5 % 50 mL IVPB 1,000 mg Intravenous Sergey Link RN     06/28/2020 2309 cefepime (MAXIPIME) 1,000 mg in dextrose 5 % 50 mL IVPB 1,000 mg Intravenous New Sanya Carrera RN     06/29/2020 1414 metroNIDAZOLE (FLAGYL) tablet 500 mg 500 mg Oral Given Muna Holbrook RN     06/29/2020 0524 metroNIDAZOLE (FLAGYL) tablet 500 mg 500 mg Oral Given Alden Carrera RN     06/28/2020 2159 metroNIDAZOLE (FLAGYL) tablet 500 mg 500 mg Oral Given Alden Carrera RN     06/29/2020 0900 clopidogrel (PLAVIX) tablet 75 mg 75 mg Oral Given Muna Holbrook RN     06/29/2020 0913 furosemide (LASIX) tablet 40 mg 40 mg Oral Not Given Muna Holbrook RN     06/28/2020 2159 melatonin tablet 6 mg 6 mg Oral Given Alden Carrera RN     06/29/2020 1706 potassium chloride (K-DUR,KLOR-CON) CR tablet 40 mEq 40 mEq Oral Given Muna Holbrook RN     06/29/2020 1108 potassium chloride (K-DUR,KLOR-CON) CR tablet 40 mEq 40 mEq Oral Given Muna Holbrook RN     06/29/2020 1413 magnesium sulfate 2 g/50 mL IVPB (premix) 2 g 2 g Intravenous New [id number] Toshia, RN          Objective:     Vitals: Blood pressure 138/69, pulse 80, temperature (!) 96 8 °F (36 °C), resp  rate 14, height 5' (1 524 m), weight 74 kg (163 lb 2 3 oz), SpO2 92 %  ,Body mass index is 31 86 kg/m²  Intake/Output Summary (Last 24 hours) at 6/29/2020 1749  Last data filed at 6/29/2020 1711  Gross per 24 hour   Intake 600 ml   Output 1500 ml   Net -900 ml       Physical Exam:   General Appearance:   Alert, cooperative, no distress   HEENT:   Normocephalic, atraumatic, anicteric  Neck:  Supple, symmetrical, trachea midline   Lungs:   Clear to auscultation bilaterally; no rales, rhonchi or wheezing; respirations unlabored    Heart[de-identified]   Regular rate and rhythm; no murmur, rub, or gallop  Abdomen:   Soft, NT on palpation  Genitalia:   Deferred    Rectal:   Deferred    Extremities:  No cyanosis, clubbing or edema    Pulses:  2+ and symmetric all extremities    Skin:  No jaundice, rashes, or lesions    Lymph nodes:  No palpable cervical lymphadenopathy        Invasive Devices     Peripheral Intravenous Line            Peripheral IV 06/27/20 Right Antecubital 2 days          Drain            External Urinary Catheter 3 days                Lab Results:  No results displayed because visit has over 200 results  Imaging Studies: I have personally reviewed pertinent imaging studies        ---------------------------------------------------  Note Electronically Signed By:    Brady Segura MD  [facility] Gastroenterology Fellow PGY-4  PI #: [id number]

## 2020-06-29 NOTE — SOCIAL WORK
KENNY was in contact with Pt's daughter Radha Fisher [phone], as a follow up on SNF choices  Radha Fisher requested CM make a SNF referral to [facility]  CM made the requested referral and requested Radha Fisher provided additional choices in the event [facility] doesn't have any bed availability  CM will continue to follow

## 2020-06-29 NOTE — PHYSICAL THERAPY NOTE
Physical Therapy Cancellation Note     Pt attempted to be seen for PT treatment session but pt is refusing treatment at this time stating she is too tired and Transport is supposed to be coming to take her for testing soon  PT to continue to follow patient on caseload as needed      Masoud Monaco, PT

## 2020-06-29 NOTE — ASSESSMENT & PLAN NOTE
· BP acceptable  · Continue amlodipine 5 mg daily, furosemide 40 mg daily, metoprolol tartrate 100 mg q 12 hours

## 2020-06-29 NOTE — PROGRESS NOTES
NEPHROLOGY PROGRESS NOTE   Danny Bolton 66 y o  female MRN: [id number]  Unit/Bed#: [facility] 724-01 Encounter: [id number]    ASSESSMENT & PLAN:  75-year-old female with history of hypertension was found down and confused and brought to the hospital   Nephrology consulted for acute kidney injury  Admission creatinine 2 48  Acute kidney injury, POA  -Suspected to be multifactorial - component of prerenal azotemia from fluid overload + possible heavy NSAID use + elevated CK -1194 on 06/25 and then trended down  UA is not consistent with a GN or AIN but does have moderate blood with only 2-4 RBCs  No hydronephrosis on CT imaging    -renal function continue to improve, creatinine down to 0 88   -continue current dose of diuretics, Lasix 40 mg daily  Dose was decreased on 06/28  -avoid nephrotoxins, hypotension    Acute systolic CHF:  Ejection fraction 35%  - Volume status improved  Continue Lasix 40 mg daily  Dose was decreased on 06/28    Hypokalemia- replaced    Primary hypertension:    -Blood pressure acceptable, continue current medication-Norvasc and metoprolol and Lasix    Elevated CK/rhabdomyolysis:  CK 1194 on admission and trended down to 281  Likely due to being down for extended period of time  Encephalopathy-improving  Suspected to have acute hepatic encephalopathy from chronic Tylenol toxicity, status post NAC  LFTs improving  Acute respiratory failure with hypoxia and hypercapnia-due to fluid overload  Improving  Wean oxygen as tolerated  Elevated troponin:  Cardiology following  New onset cardiomyopathy with ejection fraction 35%, will require ischemic evaluation as outpatient  Suspected pneumonia -on antibiotics  Discussed with primary team- Dr Lizette Stevenson  SUBJECTIVE:  No new complaints  No SOB       OBJECTIVE:  Current Weight: Weight - Scale: 74 kg (163 lb 2 3 oz)  Vitals:    06/29/20 1154   BP: 121/57   Pulse: 79   Resp: 14   Temp: (!) 96 8 °F (36 °C)   SpO2: 91%       Intake/Output Summary (Last 24 hours) at 6/29/2020 1503  Last data filed at 6/29/2020 1433  Gross per 24 hour   Intake 850 ml   Output 1850 ml   Net -1000 ml       Physical Exam  General:  Ill looking, awake  Eyes: Conjunctivae pink,  Sclera anicteric  ENT: lips and mucous membranes moist  Neck: supple   Chest: Clear to Auscultation both lungs,  no crackles, ronchus or wheezing  CVS: S1 & S2 present, normal rate, regular rhythm, no murmur    Abdomen: soft, non-tender, non-distended, Bowel sounds normoactive  Extremities: no  edema of  Legs, rt foot dressing   Skin: no rash  Neuro: awake, alert, oriented x 3      Medications:    Current Facility-Administered Medications:     albuterol inhalation solution 2 5 mg, 2 5 mg, Nebulization, Q4H PRN, Robe Douglass MD    aluminum-magnesium hydroxide-simethicone (MYLANTA) 200-200-20 mg/5 mL oral suspension 5 mL, 5 mL, Oral, Q6H PRN, Jarret Fortune MD    cefepime (MAXIPIME) 1,000 mg in dextrose 5 % 50 mL IVPB, 1,000 mg, Intravenous, Q12H, Robe Douglass MD, Stopped at 06/29/20 1145    clopidogrel (PLAVIX) tablet 75 mg, 75 mg, Oral, Daily, Marcelino Orozco MD, 75 mg at 06/29/20 0900    [START ON 6/30/2020] furosemide (LASIX) tablet 40 mg, 40 mg, Oral, Daily, YG Ely    heparin (porcine) subcutaneous injection 5,000 Units, 5,000 Units, Subcutaneous, Q8H [facility], 5,000 Units at 06/29/20 1414 **AND** [COMPLETED] Platelet count, , , Once, Jarret Fortune MD    levothyroxine tablet 125 mcg, 125 mcg, Oral, Early Morning, Jarret Fortune MD, 125 mcg at 06/29/20 0524    magnesium sulfate 2 g/50 mL IVPB (premix) 2 g, 2 g, Intravenous, Once, Robe Douglass MD, 2 g at 06/29/20 1413    melatonin tablet 6 mg, 6 mg, Oral, HS, Robe Douglass MD, 6 mg at 06/28/20 2159    metoprolol tartrate (LOPRESSOR) tablet 50 mg, 50 mg, Oral, Q12H [facility], Robe Douglass MD    metroNIDAZOLE (FLAGYL) tablet 500 mg, 500 mg, Oral, Q8H [facility], Robe Douglass MD, 500 mg at 06/29/20 1414   ondansetron (ZOFRAN) injection 4 mg, 4 mg, Intravenous, Q6H PRN, Amanda Martinez MD    potassium chloride (K-DUR,KLOR-CON) CR tablet 40 mEq, 40 mEq, Oral, BID, YG Griffin, 40 mEq at 06/29/20 1108    [START ON 6/30/2020] potassium chloride (K-DUR,KLOR-CON) CR tablet 40 mEq, 40 mEq, Oral, Daily, YG Hernandez    Invasive Devices:        Lab Results:   Results from last 7 days   Lab Units 06/29/20  0501 06/28/20  0500 06/27/20  1435 06/27/20  0503  06/26/20  0528   WBC Thousand/uL  --  14 87*  --  16 86*  --  23 63*   HEMOGLOBIN g/dL  --  11 9  --  11 9  --  11 5   HEMATOCRIT %  --  37 9  --  36 8  --  35 8   PLATELETS Thousands/uL  --  287  --  268  --  257   POTASSIUM mmol/L 2 8* 3 0* 3 4* 2 4*   < > 2 8*   CHLORIDE mmol/L 92* 97* 101 102   < > 104   CO2 mmol/L 39* 35* 38* 36*   < > 25   BUN mg/dL 21 34* 41* 47*   < > 52*   CREATININE mg/dL 0 88 1 13 1 50* 1 40*   < > 1 91*   CALCIUM mg/dL 8 1* 8 1* 8 5 8 2*   < > 7 8*   MAGNESIUM mg/dL 1 4*  --   --  1 7  --  2 1   PHOSPHORUS mg/dL  --   --   --  2 2*  --  2 9   ALK PHOS U/L 102 95  --  93   < > 89   ALT U/L 494* 794*  --  1,096*   < > 1,473*   AST U/L 132* 321*  --  604*   < > 1,293*    < > = values in this interval not displayed  Previous work up:         Portions of the record may have been created with voice recognition software  Occasional wrong word or "sound a like" substitutions may have occurred due to the inherent limitations of voice recognition software  Read the chart carefully and recognize, using context, where substitutions have occurred  If you have any questions, please contact the dictating provider

## 2020-06-29 NOTE — ASSESSMENT & PLAN NOTE
· Patient was found unresponsive   · Hepatic encephalopathy likely due to chronic acetaminophen toxicity  · S/p NAC   · Awake and alert at present  · Transaminases improving  · No hepatotoxic medications

## 2020-06-29 NOTE — CONSULTS
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  Will discuss RLE angiogram and possible intervention, though would need nephrology clearance given recent recovery after PHILIPPE    History of Present Illness     HPI:  Oren Gonzales is a 66 y o  female who is admitted to the hospital after being found down  She was brought to the ED and noted to have acute kidney injury, acute CHF, and elevated CK with encephalopathy  She does have a 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  She has a history of broken toes for which she underwent screw fixation years ago at an outside hospital  The third toe wound has been present for years with question of underlying infection  Vascular surgery is consulted for assistance with management  Review of Systems   Unable to perform ROS: Mental status change       Historical Information   Past Medical History:   Diagnosis Date    Disease of thyroid gland     Hyperlipemia     Hypertension      History reviewed  No pertinent surgical history  Social History   Social History     Substance and Sexual Activity   Alcohol Use Not Currently     Social History     Substance and Sexual Activity   Drug Use Never     Social History     Tobacco Use   Smoking Status Never Smoker   Smokeless Tobacco Never Used     Family History:   Family History   Family history unknown: Yes       Meds/Allergies   PTA meds:   Prior to Admission Medications   Prescriptions Last Dose Informant Patient Reported? Taking?    amLODIPine (NORVASC) 5 mg tablet   Yes Yes   Sig: Take 5 mg by mouth daily   atorvastatin (LIPITOR) 10 mg tablet   Yes Yes   Sig: Take 10 mg by mouth daily   ciprofloxacin (CIPRO) 750 mg tablet   Yes Yes   Sig: Take 750 mg by mouth every 24 hours   levothyroxine 125 mcg tablet   Yes Yes   Sig: Take 125 mcg by mouth daily   metoprolol tartrate (LOPRESSOR) 100 mg tablet   Yes Yes   Sig: Take 100 mg by mouth every 12 (twelve) hours   traZODone (DESYREL) 100 mg tablet   Yes Yes   Sig: Take 200 mg by mouth daily at bedtime      Facility-Administered Medications: None     Allergies   Allergen Reactions    Asa [Aspirin]     Penicillins Other (See Comments)     No reaction listed; however, tolerates cefepime which has a different side chain       Objective   First Vitals:   Blood Pressure: 158/84 (06/25/20 1534)  Pulse: 98 (06/25/20 1534)  Temperature: 99 1 °F (37 3 °C) (06/25/20 1541)  Temp Source: Tympanic (06/25/20 1541)  Respirations: (!) 26 (06/25/20 1534)  Height: 5' (152 4 cm) (06/25/20 2326)  Weight - Scale: 78 3 kg (172 lb 9 9 oz) (06/25/20 1707)  SpO2: 96 % (06/25/20 1530)    Current Vitals:   Blood Pressure: 138/69 (06/29/20 1505)  Pulse: 80 (06/29/20 1505)  Temperature: (!) 96 8 °F (36 °C) (06/29/20 1505)  Temp Source: Oral (06/26/20 0723)  Respirations: 14 (06/29/20 1505)  Height: 5' (152 4 cm) (06/25/20 2326)  Weight - Scale: 74 kg (163 lb 2 3 oz) (06/29/20 0600)  SpO2: 92 % (06/29/20 1505)      Intake/Output Summary (Last 24 hours) at 6/29/2020 1651  Last data filed at 6/29/2020 1433  Gross per 24 hour   Intake 600 ml   Output 1300 ml   Net -700 ml       Invasive Devices     Peripheral Intravenous Line            Peripheral IV 06/27/20 Right Antecubital 2 days          Drain            External Urinary Catheter 3 days                Physical Exam   Constitutional: She appears well-developed and well-nourished  HENT:   Head: Normocephalic  Eyes: Pupils are equal, round, and reactive to light  Neck: Normal range of motion  Cardiovascular: Normal rate  Pulmonary/Chest: Effort normal  No respiratory distress  Abdominal: Soft  Musculoskeletal: She exhibits no tenderness  Neurological: She is alert  Skin: Skin is warm and dry  Capillary refill takes less than 2 seconds  Psychiatric: She has a normal mood and affect  Palpable right DP/PT, bilateral fem  R 3rd toe wound    Lab Results:   CBC: No results found for: WBC, HGB, HCT, MCV, PLT, ADJUSTEDWBC, MCH, MCHC, RDW, MPV, NRBC, CMP:   Lab Results   Component Value Date    SODIUM 137 06/29/2020    K 2 8 (L) 06/29/2020    CL 92 (L) 06/29/2020    CO2 39 (H) 06/29/2020    BUN 21 06/29/2020    CREATININE 0 88 06/29/2020    CALCIUM 8 1 (L) 06/29/2020     (H) 06/29/2020     (H) 06/29/2020    ALKPHOS 102 06/29/2020    EGFR 64 06/29/2020     Imaging: I have personally reviewed pertinent reports  EKG, Pathology, and Other Studies: I have personally reviewed pertinent reports        Code Status: Level 1 - Full Code  Advance Directive and Living Will:      Power of :    POLST:

## 2020-06-29 NOTE — CONSULTS
Consultation - Geriatrics   Carroll Cortez 66 y o  female MRN: [id number]  Unit/Bed#: 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  She is cannot elaborate why, just that she wants to work  Continue to monitor, supportive care  trazodone on hold secondary to encephalopathy    3  Cognitive decline  Mini cog 4/5, recall 3/3, clock draw 1/2, unable to place hands on a clock, continue to monitor, encephalopathy contributing  TSH 1 190  Continue vitamin B12  CT head done 6/25/2020:    PARENCHYMA:  No intracranial mass, mass effect or midline shift  No CT signs of acute infarction  No acute parenchymal hemorrhage  4  Insomonia  continue melatonin  Maintain circadian rhythm  encourage daytime activity    5  Frailty  Moderate  Risk factors: limited mobility, limited caregiver support  Albumin 3 0  Protein supplementation    6  Medication Review/ Home medication  [facility] , spoke with pharmacist for list  trazodone 100 mg po QHS  Vitamin B12 500 mg po daily  Norvasc 5 mg po daily  Levothyroxine 125 mcg  Cipro 750 mg           History of Present Illness   Physician Requesting Consult: Carlos Paula MD  Reason for Consult / Principal Problem: anxiety  Hx and PE limited by: NA  HPI: Carroll Cortez is a 66y o  year old female who presents with unresponsiveness  3 weeks ago she was in [facility] due to the inability to take care of herself  Per her niece the house was unkempt and the utilities were turned off  Upon discharge she went to a hotel for 2 days because her house was not functional  The she stayed with her niece  Over the past week she was becoming increasingly lethargic  On the afternoon of 6/25/2020 she was noted to be blue and unresponsive   EMS was called and she was brought to the ER  Upon arrival she was lethargic and placed on Bipap with good resolution of her mental status  Her AST/ALT were elevated  Per niece she does drink sometimes and she takes tylenol pm      She has insomnia, depression, HTN, hyperlipidemia    Prior to arrival pt lives with her niece for the past 3 weeks  Previously she drove  She was independent with IADLs and ADLs  She ambulated independently  No history of falls  She has chronic insomnia  Spoke with daughter Vonda Chaves to review HPI  She states her mom was completely independent and sharp prior to hospitalization  Upon exam pt is lying in bed  She is alert and oriented x4  She is forgetful at times  Delayed recall  She states she wants to get a part time job  Spoke with Daughter Oscar Wilson  to review HPI  Spoke with niece     Inpatient consult to Gerontology  Consult performed by: YG Mclean  Consult ordered by: Denis Sheth MD          Review of Systems   Constitutional: Negative for unexpected weight change  HENT: Negative for hearing loss  Eyes: Negative for visual disturbance  Respiratory: Negative for shortness of breath  Cardiovascular: Negative for chest pain  Gastrointestinal: Negative for constipation  Genitourinary: Negative for difficulty urinating  Musculoskeletal: Positive for gait problem  Skin: Negative for color change  Neurological: Negative for dizziness  Psychiatric/Behavioral: Positive for sleep disturbance  Historical Information   Past Medical History:   Diagnosis Date    Disease of thyroid gland     Hyperlipemia     Hypertension      History reviewed  No pertinent surgical history    Social History   Social History     Substance and Sexual Activity   Alcohol Use Not Currently     Social History     Substance and Sexual Activity   Drug Use Never     Social History     Tobacco Use   Smoking Status Never Smoker   Smokeless Tobacco Never Used         Family History:   Family History   Family history unknown: Yes       Meds/Allergies   Current meds:   Current Facility-Administered Medications   Medication Dose Route Frequency    albuterol inhalation solution 2 5 mg  2 5 mg Nebulization Q4H PRN    aluminum-magnesium hydroxide-simethicone (MYLANTA) 200-200-20 mg/5 mL oral suspension 5 mL  5 mL Oral Q6H PRN    cefepime (MAXIPIME) 1,000 mg in dextrose 5 % 50 mL IVPB  1,000 mg Intravenous Q12H    clopidogrel (PLAVIX) tablet 75 mg  75 mg Oral Daily    [START ON 6/30/2020] furosemide (LASIX) tablet 40 mg  40 mg Oral Daily    heparin (porcine) subcutaneous injection 5,000 Units  5,000 Units Subcutaneous Q8H [address]    levothyroxine tablet 125 mcg  125 mcg Oral Early Morning    magnesium sulfate 2 g/50 mL IVPB (premix) 2 g  2 g Intravenous Once    melatonin tablet 6 mg  6 mg Oral HS    metoprolol tartrate (LOPRESSOR) tablet 50 mg  50 mg Oral Q12H DANNY    metroNIDAZOLE (FLAGYL) tablet 500 mg  500 mg Oral Q8H [address]    ondansetron (ZOFRAN) injection 4 mg  4 mg Intravenous Q6H PRN    potassium chloride (K-DUR,KLOR-CON) CR tablet 40 mEq  40 mEq Oral BID    [START ON 6/30/2020] potassium chloride (K-DUR,KLOR-CON) CR tablet 40 mEq  40 mEq Oral Daily           Allergies   Allergen Reactions    Asa [Aspirin]     Penicillins Other (See Comments)     No reaction listed; however, tolerates cefepime which has a different side chain       Objective   Vitals: Blood pressure 121/57, pulse 79, temperature (!) 96 8 °F (36 °C), resp  rate 14, height 5' (1 524 m), weight 74 kg (163 lb 2 3 oz), SpO2 91 %  ,Body mass index is 31 86 kg/m²  Physical Exam   Constitutional: She is oriented to person, place, and time  She appears well-developed and well-nourished  No distress  HENT:   Head: Normocephalic  Eyes: Left eye exhibits no discharge  Neck: Normal range of motion  Cardiovascular: Normal rate, regular rhythm and normal heart sounds  Exam reveals no gallop and no friction rub  No murmur heard  Pulmonary/Chest: Effort normal and breath sounds normal  No stridor  No respiratory distress  She has no wheezes  Abdominal: Soft  Bowel sounds are normal  She exhibits no distension  There is no tenderness  There is no guarding  Musculoskeletal: Normal range of motion  She exhibits no edema or deformity  Neurological: She is alert and oriented to person, place, and time  Skin: Skin is warm and dry  She is not diaphoretic  Psychiatric:   flat   Nursing note and vitals reviewed  Lab Results:   Results from last 7 days   Lab Units 06/28/20  0500   WBC Thousand/uL 14 87*   HEMOGLOBIN g/dL 11 9   HEMATOCRIT % 37 9   PLATELETS Thousands/uL 287        Results from last 7 days   Lab Units 06/29/20  0501   POTASSIUM mmol/L 2 8*   CHLORIDE mmol/L 92*   CO2 mmol/L 39*   BUN mg/dL 21   CREATININE mg/dL 0 88   CALCIUM mg/dL 8 1*   ALK PHOS U/L 102   ALT U/L 494*   AST U/L 132*       Imaging Studies: I have personally reviewed pertinent reports  EKG, Pathology, and Other Studies: I have personally reviewed pertinent reports      VTE Prophylaxis: Sequential compression device (Venodyne)     Code Status: Level 1 - Full Code

## 2020-06-29 NOTE — ASSESSMENT & PLAN NOTE
· CT chest with bilateral centrally oriented alveolar opacities   · Opacities mainly due to volume overload however has elevated procalcitonin and leukocytosis  · Was found unresponsive - concern for aspiration  · Cefepime/vancomycin/metronidazole  · F/u MRSA swab  · Follow-up blood cultures  · Monitor temperature, WBC  · Trend procalcitonin

## 2020-06-29 NOTE — PLAN OF CARE
Problem: Potential for Falls  Goal: Patient will remain free of falls  Description  INTERVENTIONS:  - Assess patient frequently for physical needs  -  Identify cognitive and physical deficits and behaviors that affect risk of falls    -  [place] fall precautions as indicated by assessment   - Educate patient/family on patient safety including physical limitations  - Instruct patient to call for assistance with activity based on assessment  - Modify environment to reduce risk of injury  - Consider OT/PT consult to assist with strengthening/mobility  Outcome: Progressing     Problem: Prexisting or High Potential for Compromised Skin Integrity  Goal: Skin integrity is maintained or improved  Description  INTERVENTIONS:  - Identify patients at risk for skin breakdown  - Assess and monitor skin integrity  - Assess and monitor nutrition and hydration status  - Monitor labs   - Assess for incontinence   - Turn and reposition patient  - Assist with mobility/ambulation  - Relieve pressure over bony prominences  - Avoid friction and shearing  - Provide appropriate hygiene as needed including keeping skin clean and dry  - Evaluate need for skin moisturizer/barrier cream  - Collaborate with interdisciplinary team   - Patient/family teaching  - Consider wound care consult   Outcome: Progressing     Problem: SAFETY ADULT  Goal: Maintain or return to baseline ADL function  Description  INTERVENTIONS:  -  Assess patient's ability to carry out ADLs; assess patient's baseline for ADL function and identify physical deficits which impact ability to perform ADLs (bathing, care of mouth/teeth, toileting, grooming, dressing, etc )  - Assess/evaluate cause of self-care deficits   - Assess range of motion  - Assess patient's mobility; develop plan if impaired  - Assess patient's need for assistive devices and provide as appropriate  - Encourage maximum independence but intervene and supervise when necessary  - Involve family in performance of ADLs  - Assess for home care needs following discharge   - Consider OT consult to assist with ADL evaluation and planning for discharge  - Provide patient education as appropriate  Outcome: Progressing  Goal: Maintain or return mobility status to optimal level  Description  INTERVENTIONS:  - Assess patient's baseline mobility status (ambulation, transfers, stairs, etc )    - Identify cognitive and physical deficits and behaviors that affect mobility  - Identify mobility aids required to assist with transfers and/or ambulation (gait belt, sit-to-stand, lift, walker, cane, etc )  - [place] fall precautions as indicated by assessment  - Record patient progress and toleration of activity level on Mobility SBAR; progress patient to next Phase/Stage  - Instruct patient to call for assistance with activity based on assessment  - Consider rehabilitation consult to assist with strengthening/weightbearing, etc   Outcome: Progressing     Problem: DISCHARGE PLANNING  Goal: Discharge to home or other facility with appropriate resources  Description  INTERVENTIONS:  - Identify barriers to discharge w/patient and caregiver  - Arrange for needed discharge resources and transportation as appropriate  - Identify discharge learning needs (meds, wound care, etc )  - Refer to Case Management Department for coordinating discharge planning if the patient needs post-hospital services based on physician/advanced practitioner order or complex needs related to functional status, cognitive ability, or social support system   Outcome: Progressing     Problem: CARDIOVASCULAR - ADULT  Goal: Maintains optimal cardiac output and hemodynamic stability  Description  INTERVENTIONS:  - Monitor I/O, vital signs and rhythm  - Monitor for S/S and trends of decreased cardiac output  - Administer and titrate ordered vasoactive medications to optimize hemodynamic stability  - Assess quality of pulses, skin color and temperature  - Assess for signs of decreased coronary artery perfusion  - Instruct patient to report change in severity of symptoms  Outcome: Progressing     Problem: METABOLIC, FLUID AND ELECTROLYTES - ADULT  Goal: Electrolytes maintained within normal limits  Description  INTERVENTIONS:  - Monitor labs and assess patient for signs and symptoms of electrolyte imbalances  - Administer electrolyte replacement as ordered  - Monitor response to electrolyte replacements, including repeat lab results as appropriate  - Instruct patient on fluid and nutrition as appropriate  Outcome: Progressing  Goal: Fluid balance maintained  Description  INTERVENTIONS:  - Monitor labs   - Monitor I/O and WT  - Instruct patient on fluid and nutrition as appropriate  - Assess for signs & symptoms of volume excess or deficit  Outcome: Progressing     Problem: SKIN/TISSUE INTEGRITY - ADULT  Goal: Skin integrity remains intact  Description  INTERVENTIONS  - Identify patients at risk for skin breakdown  - Assess and monitor skin integrity  - Assess and monitor nutrition and hydration status  - Monitor labs (i e  albumin)  - Assess for incontinence   - Turn and reposition patient  - Assist with mobility/ambulation  - Relieve pressure over bony prominences  - Avoid friction and shearing  - Provide appropriate hygiene as needed including keeping skin clean and dry  - Evaluate need for skin moisturizer/barrier cream  - Collaborate with interdisciplinary team (i e  Nutrition, Rehabilitation, etc )   - Patient/family teaching  Outcome: Progressing     Problem: MUSCULOSKELETAL - ADULT  Goal: Maintain or return mobility to safest level of function  Description  INTERVENTIONS:  - Assess patient's ability to carry out ADLs; assess patient's baseline for ADL function and identify physical deficits which impact ability to perform ADLs (bathing, care of mouth/teeth, toileting, grooming, dressing, etc )  - Assess/evaluate cause of self-care deficits   - Assess range of motion  - Assess patient's mobility  - Assess patient's need for assistive devices and provide as appropriate  - Encourage maximum independence but intervene and supervise when necessary  - Involve family in performance of ADLs  - Assess for home care needs following discharge   - Consider OT consult to assist with ADL evaluation and planning for discharge  - Provide patient education as appropriate  Outcome: Progressing  Goal: Maintain proper alignment of affected body part  Description  INTERVENTIONS:  - Support, maintain and protect limb and body alignment  - Provide patient/ family with appropriate education  Outcome: Progressing     Problem: NEUROSENSORY - ADULT  Goal: Achieves stable or improved neurological status  Description  INTERVENTIONS  - Monitor and report changes in neurological status  - Monitor vital signs such as temperature, blood pressure, glucose, and any other labs ordered   - Initiate measures to prevent increased intracranial pressure  - Monitor for seizure activity and implement precautions if appropriate      Outcome: Progressing

## 2020-06-29 NOTE — PROGRESS NOTES
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  · S/p NAC   · Awake and alert at present  · Transaminases improving  · No hepatotoxic medications    Cardiomyopathy  Assessment & Plan  Wt Readings from Last 3 Encounters:   06/28/20 79 2 kg (174 lb 9 7 oz)       · Echo with EF 35%, segmental wall motion abnormality, mild aortic, mitral and tricuspid regurgitation  · Was on IV Lasix - changed to PO on 06/28 - dose decreased to 40 mg daily today  · Will need eventual ischemic workup  · Plavix 75 mg daily as she is allergic to aspirin  · No statins due to transaminitis and elevated CK  · Continue metoprolol tartrate 100 mg q 12 hours  · No ACE/ARB due to acute kidney injury    PHILIPPE (acute kidney injury) ([facility] [address] )  Assessment & Plan  · Improved  · The dose of Lasix decreased to 40 mg daily       Suspected pneumonia  Assessment & Plan  · CT chest with bilateral centrally oriented alveolar opacities   · Opacities mainly due to volume overload however has elevated procalcitonin and leukocytosis  · Was found unresponsive - concern for aspiration  · Cefepime/vancomycin/metronidazole  · F/u MRSA swab  · Follow-up blood cultures  · Monitor temperature, WBC  · Trend procalcitonin    Foot ulcer (HCC)  Assessment & Plan  · Ulceration which probes to bone noted over the dorsal aspect of the right 3rd digit  · Bone scan and lower extremity arterial Dopplers ordered by Podiatry - input appreciated - follow-up results    Presence of retained hardware  Assessment & Plan  · Has broken screw in right 2nd toe which is asymptomatic  · Was scheduled to have it removed by Dr Farzana Santana   · Podiatry plans to call him after bone scan an Doppler results are available to discuss plan      Acute respiratory failure with hypoxia and hypercapnia (HCC)  Assessment & Plan  · Due to acute CHF  · Wean O2 with diuresis    Elevated troponin  Assessment & Plan  · Troponin -  - 10 - 8 - 8  · Non MI troponin elevation in the setting of renal insufficiency, hypoxia, volume overload  · Echo with EF of 35%  · Eventual ischemic evaluation per Cardiology  · On Plavix, BB      Elevated CK  Assessment & Plan  · Improved to 281 from 1194  · No IV fluids as volume overloaded  · Monitor    Hypertension  Assessment & Plan  · BP acceptable  · Continue amlodipine 5 mg daily, furosemide 40 mg daily, metoprolol tartrate 100 mg q 12 hours    Hypothyroidism  Assessment & Plan  · TSH normal  · Continue levothyroxine 125 mcg daily    Hypokalemia  Assessment & Plan  · Replete      VTE Pharmacologic Prophylaxis:   Pharmacologic: Heparin  Mechanical VTE Prophylaxis in Place: Yes    Patient Centered Rounds: I have performed bedside rounds with nursing staff today  Education and Discussions with Family / Patient:  Discussed with daughter on phone    Time Spent for Care: 20 minutes  More than 50% of total time spent on counseling and coordination of care as described above  Current Length of Stay: 4 day(s)    Current Patient Status: Inpatient   Certification Statement: The patient will continue to require additional inpatient hospital stay due to Pneumonia, foot ulcer undergoing evaluation    Code Status: Level 1 - Full Code    Subjective:   More interactive today  No fever  Occasional cough  No nausea, vomiting or diarrhea    Objective:     Vitals:   Temp (24hrs), Av 4 °F (36 9 °C), Min:97 4 °F (36 3 °C), Max:99 5 °F (37 5 °C)    Temp:  [97 4 °F (36 3 °C)-99 5 °F (37 5 °C)] 98 7 °F (37 1 °C)  HR:  [71-82] 82  Resp:  [14-18] 14  BP: (129-143)/(59-67) 133/60  SpO2:  [87 %-97 %] 97 %  Body mass index is 34 1 kg/m²       Physical Exam: Physical Exam   Constitutional: She is oriented to person, place, and time  HENT:   Head: Normocephalic and atraumatic  Eyes: Pupils are equal, round, and reactive to light  EOM are normal    Neck: Normal range of motion  Neck supple  Cardiovascular: Normal rate and regular rhythm  Pulmonary/Chest: Effort normal and breath sounds normal    Abdominal: Soft  Bowel sounds are normal    Musculoskeletal: She exhibits no edema  Neurological: She is alert and oriented to person, place, and time  Skin:   Right 3rd digit ulcer   Psychiatric: She has a normal mood and affect  Additional Data:     Labs:    Results from last 7 days   Lab Units 06/28/20  0500   WBC Thousand/uL 14 87*   HEMOGLOBIN g/dL 11 9   HEMATOCRIT % 37 9   PLATELETS Thousands/uL 287   NEUTROS PCT % 69   LYMPHS PCT % 15   MONOS PCT % 13*   EOS PCT % 2     Results from last 7 days   Lab Units 06/28/20  0500   SODIUM mmol/L 142   POTASSIUM mmol/L 3 0*   CHLORIDE mmol/L 97*   CO2 mmol/L 35*   BUN mg/dL 34*   CREATININE mg/dL 1 13   ANION GAP mmol/L 10   CALCIUM mg/dL 8 1*   ALBUMIN g/dL 3 1*   TOTAL BILIRUBIN mg/dL 0 62   ALK PHOS U/L 95   ALT U/L 794*   AST U/L 321*   GLUCOSE RANDOM mg/dL 135     Results from last 7 days   Lab Units 06/27/20  1435   INR  1 21*     Results from last 7 days   Lab Units 06/25/20  1531   POC GLUCOSE mg/dl 160*         Results from last 7 days   Lab Units 06/28/20  0500 06/26/20  2130 06/26/20  0903 06/25/20  1844 06/25/20  1649   LACTIC ACID mmol/L  --   --   --  1 9 3 8*   PROCALCITONIN ng/ml 2 24* 6 24* 8 81*  --   --            * I Have Reviewed All Lab Data Listed Above  * Additional Pertinent Lab Tests Reviewed: [address] Admission Reviewed    Recent Cultures (last 7 days):     Results from last 7 days   Lab Units 06/27/20  1104 06/26/20  1121 06/26/20  1112   BLOOD CULTURE   --  No Growth at 48 hrs  No Growth at 48 hrs     LEGIONELLA URINARY ANTIGEN  Negative  --   --        Last 24 Hours Medication List:     Current Facility-Administered Medications:  albuterol 2 5 mg Nebulization Q4H PRN Skyler Way MD    aluminum-magnesium hydroxide-simethicone 5 mL Oral Q6H PRN Donell Malagon MD    amLODIPine 5 mg Oral Daily Donell Malagon MD    cefepime 1,000 mg Intravenous Q12H Skyler Way MD Last Rate: 1,000 mg (06/28/20 2309)   clopidogrel 75 mg Oral Daily Teressa Ko MD    furosemide 40 mg Oral Daily Enrico Smith MD    heparin (porcine) 5,000 Units Subcutaneous Q8H [facility] Donell Malagon MD    levothyroxine 125 mcg Oral Early Morning Donell Malagon MD    melatonin 6 mg Oral HS Skyler Way MD    metoprolol tartrate 100 mg Oral Q12H [facility] Donell Malagon MD    metroNIDAZOLE 500 mg Oral [facility] Skyler Way MD    ondansetron 4 mg Intravenous Q6H PRN Donell Malagon MD    vancomycin 20 mg/kg (Adjusted) Intravenous Q24H Skyler Way MD Last Rate: Stopped (06/28/20 1545)        Today, Patient Was Seen By: Skyler Way MD    ** Please Note: Dictation voice to text software may have been used in the creation of this document   **

## 2020-06-29 NOTE — PROGRESS NOTES
General Cardiology   Progress Note -  Team One   Solitario Moreno 66 y o  female MRN: [id number]    [facility] Encounter: [id number]    Assessment/ Plan    1  Acute metabolic encephalopathy- mentation improved       2  Acute hypoxic and hypercapnic respiratory failure  Currently 92% on 2LNC       3  Acute systolic CHF  Initially diuresed with IV Lasix, now transitioned to oral Lasix 40 mg daily  Appears euvolemic on exam   I/O: +560mL/24 hours, net negative 3 3 L  Weight: 163 lb per bed scale    4  New cardiomyopathy, LVEF 35%  Possibly stress induced but will need ischemic evaluation  GDMT- metoprolol tartrate 100 mg q12 hours  ACE-I on hold in setting of PHILIPPE, should be able to start today, Cr  0 88  No cardiac symptoms- timing of stress test will be discussed with attending  5  Elevated troponin likely representing non MI troponin elevation in setting of renal insufficiency, hypoxia, and volume overload  ECG- NSR with nonspecific ST/T wave changes  Will need ischemic evaluation in setting of #4      6  PHILIPPE, resolved- Cr 2 48 on admission, now 0 88      7  Transaminitis- improving  RUQ ultrasound without acute findings  Possibly due to acute CHF/volume overload vs acetaminophen toxicity- treated with acetylcysteine  Gastroenterology following      8  Right foot ulcer- WBC count trending down, IV antibiotics per primary team  Podiatry on board      9  Hypothyroidism- TSH WNL     10  HTN- stable, 24 hour average /58  On amlodipine 5 mg daily, Lasix 40 mg daily, and metoprolol tartrate 100 mg q12 hours      11  Hypokalemia- K 2 8, replete  Subjective  Review of Systems   Constitution: Positive for malaise/fatigue  Negative for chills and diaphoresis  Cardiovascular: Negative for chest pain, dyspnea on exertion, leg swelling, orthopnea, palpitations and syncope  Respiratory: Negative for cough and shortness of breath      Gastrointestinal: Negative for bloating, abdominal pain, nausea and vomiting  Neurological: Positive for weakness (improving)  Negative for dizziness and light-headedness  All other systems reviewed and are negative  Objective:   Vitals: Blood pressure (!) 110/49, pulse 73, temperature (!) 96 4 °F (35 8 °C), resp  rate 14, height 5' (1 524 m), weight 74 kg (163 lb 2 3 oz), SpO2 92 %  , Body mass index is 31 86 kg/m²  ,     Systolic (47ASU), HUW:012 , Min:105 , XTQ:061     Diastolic (83DBQ), RLP:54, Min:49, Max:73      Intake/Output Summary (Last 24 hours) at 6/29/2020 0902  Last data filed at 6/28/2020 2158  Gross per 24 hour   Intake 1490 ml   Output 1500 ml   Net -10 ml     Wt Readings from Last 3 Encounters:   06/29/20 74 kg (163 lb 2 3 oz)     Telemetry Review: N/A  Physical Exam   Constitutional: She is oriented to person, place, and time  No distress  Elderly female laying nearly flat in bed, appears comfortable and without acute distress  Neck: Neck supple  No hepatojugular reflux and no JVD present  Cardiovascular: Normal rate, regular rhythm, normal heart sounds and intact distal pulses  Exam reveals no gallop and no friction rub  No murmur heard  Pulmonary/Chest: Effort normal  No respiratory distress  She has no rales  Decreased at bases, 2LNC  Abdominal: Soft  Bowel sounds are normal  She exhibits no distension  Musculoskeletal: Normal range of motion  She exhibits no edema  Neurological: She is alert and oriented to person, place, and time  Skin: Skin is warm and dry  She is not diaphoretic  No erythema  Right toe wound dressing C/D/I  Psychiatric:   Flat affect   Vitals reviewed      LABORATORY RESULTS  Results from last 7 days   Lab Units 06/29/20  0501 06/27/20  0503 06/26/20  0903 06/26/20  0528 06/26/20  0152 06/25/20  1807 06/25/20  1532   CK TOTAL U/L 62 281* 746*  --   --   --  1,194*   TROPONIN I ng/mL  --   --   --  8 01* 8 02* 10 20* 11 00*   CK MB INDEX %  --  1 6 1 6  --   --   --  1 1     CBC with diff:   Results from last 7 days   Lab Units 06/28/20  0500 06/27/20  0503 06/26/20  0528 06/26/20  0152 06/25/20  1532   WBC Thousand/uL 14 87* 16 86* 23 63*  --  23 86*   HEMOGLOBIN g/dL 11 9 11 9 11 5  --  12 6   HEMATOCRIT % 37 9 36 8 35 8  --  39 2   MCV fL 100* 99* 99*  --  100*   PLATELETS Thousands/uL 287 268 257 252 290   MCH pg 31 4 32 1 31 7  --  32 1   MCHC g/dL 31 4 32 3 32 1  --  32 1   RDW % 13 2 13 5 13 7  --  13 7   MPV fL 13 1* 13 1* 12 7 12 5 12 5   NRBC AUTO /100 WBCs 0  --  0  --  0       CMP:  Results from last 7 days   Lab Units 06/29/20  0501 06/28/20  0500 06/27/20  1435 06/27/20  0503 06/26/20  2023 06/26/20  0528 06/25/20  1532   POTASSIUM mmol/L 2 8* 3 0* 3 4* 2 4* 3 4* 2 8* 3 5   CHLORIDE mmol/L 92* 97* 101 102 103 104 101   CO2 mmol/L 39* 35* 38* 36* 27 25 24   BUN mg/dL 21 34* 41* 47* 48* 52* 51*   CREATININE mg/dL 0 88 1 13 1 50* 1 40* 1 63* 1 91* 2 48*   CALCIUM mg/dL 8 1* 8 1* 8 5 8 2* 8 0* 7 8* 8 1*   AST U/L 132* 321*  --  604* 732* 1,293* 3,174*   ALT U/L 494* 794*  --  1,096* 1,211* 1,473* 2,164*   ALK PHOS U/L 102 95  --  93 96 89 111   EGFR ml/min/1 73sq m 64 47 34 37 30 25 18       BMP:  Results from last 7 days   Lab Units 06/29/20  0501 06/28/20  0500 06/27/20  1435 06/27/20  0503 06/26/20  2023 06/26/20  0528 06/25/20  1532   POTASSIUM mmol/L 2 8* 3 0* 3 4* 2 4* 3 4* 2 8* 3 5   CHLORIDE mmol/L 92* 97* 101 102 103 104 101   CO2 mmol/L 39* 35* 38* 36* 27 25 24   BUN mg/dL 21 34* 41* 47* 48* 52* 51*   CREATININE mg/dL 0 88 1 13 1 50* 1 40* 1 63* 1 91* 2 48*   CALCIUM mg/dL 8 1* 8 1* 8 5 8 2* 8 0* 7 8* 8 1*       Lab Results   Component Value Date    CREATININE 0 88 06/29/2020    CREATININE 1 13 06/28/2020    CREATININE 1 50 (H) 06/27/2020       Lab Results   Component Value Date    NTBNP 82,271 (H) 06/25/2020           Results from last 7 days   Lab Units 06/29/20  0501 06/27/20  0503 06/26/20  0528   MAGNESIUM mg/dL 1 4* 1 7 2 1                 Results from last 7 days   Lab Units 06/26/20  0903   TSH 3RD GENERATON uIU/mL 1 190       Results from last 7 days   Lab Units 20  0501 20  1435 20  2130 20  1113 20  0152 20  1532   INR  1 19 1 21* 1 33* 1 46* 1 61* 1 53*       Lipid Profile:   No results found for: CHOL  No results found for: HDL  No results found for: LDLCALC  No results found for: TRIG    Cardiac testing:   Results for orders placed during the hospital encounter of 20   Echo complete with contrast if indicated    Narrative [address]  [facility], [address] [facility]  [phone]    Transthoracic Echocardiogram  2D, M-mode, Doppler, and Color Doppler    Study date:  2020    Patient: John Mcgrath  MR number: [id number]  Account number: [de-identified]  : 1945  Age: 66 years  Gender: Female  Status: Inpatient  Location: Bedside  Height: 60 in  Weight: 172 lb  BP: 158/ 75 mmHg    Indications: Toxic Myopathy    Diagnoses: G72 9 - Myopathy, unspecified    Sonographer:  Justus Kayser, RCS  Referring Physician:  Sveta Foley MD  Group:  [facility]  Interpreting Physician:  DO MARILUZ Jarrell    LEFT VENTRICLE:  Systolic function was moderately to markedly reduced  Ejection fraction was estimated to be 35 %  There was akinesis of the apical anterior, mid anteroseptal, mid inferoseptal, apical inferior, apical septal, apical lateral, and apical wall(s)  Wall thickness was mildly increased  The changes were consistent with concentric remodeling (increased wall thickness with normal wall mass)  Features were consistent with a pseudonormal left ventricular filling pattern, with concomitant abnormal relaxation and increased filling pressure (grade 2 diastolic dysfunction)  Doppler parameters were consistent with high ventricular filling pressure  No evidence of apical thrombus      RIGHT VENTRICLE:  The size was normal   Systolic function was normal     LEFT ATRIUM:  The atrium was mildly dilated  MITRAL VALVE:  There was mild to moderate annular calcification  There was mild regurgitation  AORTIC VALVE:  There was mild regurgitation  TRICUSPID VALVE:  There was mild regurgitation  Estimated peak PA pressure was 56 mmHg  The findings suggest moderate pulmonary hypertension  HISTORY: PRIOR HISTORY: HTN,HLD    PROCEDURE: The procedure was performed at the bedside  This was a routine study  The transthoracic approach was used  The study included complete 2D imaging, M-mode, complete spectral Doppler, and color Doppler  The heart rate was 67 bpm,  at the start of the study  Intravenous contrast (  6ml Definity in NSS) was administered to opacify the left ventricle  Echocardiographic views were limited due to poor patient compliance, poor acoustic window availability, and lung  interference  This was a technically difficult study  LEFT VENTRICLE: Size was normal  Systolic function was moderately to markedly reduced  Ejection fraction was estimated to be 35 %  There was akinesis of the apical anterior, mid anteroseptal, mid inferoseptal, apical inferior, apical  septal, apical lateral, and apical wall(s)  Wall thickness was mildly increased  The changes were consistent with concentric remodeling (increased wall thickness with normal wall mass)  No evidence of apical thrombus  DOPPLER: Features  were consistent with a pseudonormal left ventricular filling pattern, with concomitant abnormal relaxation and increased filling pressure (grade 2 diastolic dysfunction)  Doppler parameters were consistent with high ventricular filling  pressure  RIGHT VENTRICLE: The size was normal  Systolic function was normal  Wall thickness was normal     LEFT ATRIUM: The atrium was mildly dilated  RIGHT ATRIUM: Size was normal     MITRAL VALVE: There was mild to moderate annular calcification  There was normal leaflet separation  DOPPLER: The transmitral velocity was within the normal range   There was no evidence for stenosis  There was mild regurgitation  AORTIC VALVE: The valve was trileaflet  Leaflets exhibited normal cuspal separation and sclerosis  DOPPLER: Transaortic velocity was within the normal range  There was no evidence for stenosis  There was mild regurgitation  TRICUSPID VALVE: The valve structure was normal  There was normal leaflet separation  DOPPLER: The transtricuspid velocity was within the normal range  There was no evidence for stenosis  There was mild regurgitation  Pulmonary artery  systolic pressure was moderately increased  Estimated peak PA pressure was 56 mmHg  The findings suggest moderate pulmonary hypertension  PULMONIC VALVE: Leaflets exhibited normal thickness, no calcification, and normal cuspal separation  DOPPLER: The transpulmonic velocity was within the normal range  There was no regurgitation  PERICARDIUM: There was no pericardial effusion  The pericardium was normal in appearance  AORTA: The root exhibited normal size  SYSTEMIC VEINS: IVC: The inferior vena cava was mildly dilated  Respirophasic changes were blunted (less than 50% variation)  SYSTEM MEASUREMENT TABLES    2D mode  AV Diam: 2 8 cm  AoR Diam; Mean (2D): 2 8 cm  LA Diam (2D): 3 1 cm  LA Dimension; Mean (2D): 3 1 cm  LA/Ao (2D): 1 11  EDV (2D-Cubed): 85 2 cm3  EF (2D-Cubed): 71 4 %  ESV (2D-Cubed): 24 4 cm3  FS (2D-Cubed): 34 1 %  FS (2D-Teich): 34 1 %  IVS/LVPW (2D): 1 2  IVSd (2D): 1 2 cm  IVSd; Mean chosen (2D): 1 2 cm  LVIDd (2D): 4 4 cm  LVIDd; Mean (2D): 4 4 cm  LVIDs (2D): 2 9 cm  LVIDs; Mean (2D): 2 9 cm  LVPWd (2D): 1 cm  LVPWd; Mean (2D): 1 cm  Left Ventricular Ejection Fraction; Teichholz; 2D mode;: 63 3 %  Left Ventricular End Diastolic Volume; Teichholz; 2D mode;: 87 7 cm3  Left Ventricular End Systolic Volume; Teichholz; 2D mode;: 32 2 cm3  SV (2D-Cubed): 60 8 cm3  Stroke Volume;  Teichholz; 2D mode;: 55 5 cm3    M mode  Tricuspid Annular Plane Systolic Excursion; Mean; Mean value chosen; Tricuspid Annulus; M mode;: 2 03 cm  Tricuspid Annular Plane Systolic Excursion; Tricuspid Annulus; M mode;: 2 03 cm    Tissue Doppler Imaging  LV Peak Early Ross Tissue Noah; Medial MA (TDI): 57 2 mm/s  Left Ventricular Peak Early Diastolic Tissue Velocity; Mean; Mean value chosen; Medial Mitral Annulus; Tissue Doppler Imaging;: 57 2 mm/s    Unspecified Scan Mode  Dec Norton; Antegrade Flow: 6030 mm/s2  Dec Norton; Antegrade Flow: 7170 mm/s2  Dec Norton; Mean; Antegrade Flow: 6600 mm/s2  MV A Noah: 820 mm/s  MV Peak A Noah: 820 mm/s  Mean Grad; Antegrade Flow: 3 mm[Hg]  Mean Grad; Mean value; Antegrade Flow: 3 mm[Hg]  Mean Noah; Antegrade Flow: 710 mm/s  Mean Noah; Mean value; Antegrade Flow: 710 mm/s  Peak Grad; Mean; Antegrade Flow: 10 mm[Hg]  VTI; Antegrade Flow: 39 4 cm  VTI; Mean; Antegrade Flow: 39 4 cm  Vmax; Antegrade Flow: 1560 mm/s  Vmax; Mean; Antegrade Flow: 1560 mm/s  Peak Grad; Mean; Regurgitant Flow: 35 mm[Hg]  Vmax; Mean; Regurgitant Flow: 2950 mm/s  Vmax; Regurgitant Flow: 2290 mm/s  Vmax; Regurgitant Flow: 3350 mm/s  Vmax; Regurgitant Flow: 3380 mm/s  Vmax; Regurgitant Flow: 3210 mm/s  Vmax; Regurgitant Flow: 2520 mm/s    [facility]    Prepared and electronically signed by    Tim Hart DO  Signed 26-Jun-2020 12:05:04       No results found for this or any previous visit  No results found for this or any previous visit  No procedure found  No results found for this or any previous visit      Meds/Allergies   all current active meds have been reviewed and current meds:   Current Facility-Administered Medications   Medication Dose Route Frequency    albuterol inhalation solution 2 5 mg  2 5 mg Nebulization Q4H PRN    aluminum-magnesium hydroxide-simethicone (MYLANTA) 200-200-20 mg/5 mL oral suspension 5 mL  5 mL Oral Q6H PRN    amLODIPine (NORVASC) tablet 5 mg  5 mg Oral Daily    cefepime (MAXIPIME) 1,000 mg in dextrose 5 % 50 mL IVPB  1,000 mg Intravenous Q12H    clopidogrel (PLAVIX) tablet 75 mg  75 mg Oral Daily    furosemide (LASIX) tablet 40 mg  40 mg Oral Daily    heparin (porcine) subcutaneous injection 5,000 Units  5,000 Units Subcutaneous Q8H [facility]    levothyroxine tablet 125 mcg  125 mcg Oral Early Morning    melatonin tablet 6 mg  6 mg Oral HS    metoprolol tartrate (LOPRESSOR) tablet 100 mg  100 mg Oral Q12H DANNY    metroNIDAZOLE (FLAGYL) tablet 500 mg  500 mg Oral Q8H [facility]    ondansetron (ZOFRAN) injection 4 mg  4 mg Intravenous Q6H PRN    vancomycin (VANCOCIN) 1,250 mg in sodium chloride 0 9 % 250 mL IVPB  20 mg/kg (Adjusted) Intravenous Q24H     Medications Prior to Admission   Medication    amLODIPine (NORVASC) 5 mg tablet    atorvastatin (LIPITOR) 10 mg tablet    ciprofloxacin (CIPRO) 750 mg tablet    levothyroxine 125 mcg tablet    metoprolol tartrate (LOPRESSOR) 100 mg tablet    traZODone (DESYREL) 100 mg tablet   Assessment:  Principal Problem:    Acute hepatic encephalopathy  Active Problems:    PHILIPPE (acute kidney injury) (HCC)    Elevated troponin    Transaminitis    Elevated INR    Leukocytosis    Acute respiratory failure with hypoxia and hypercapnia (HCC)    Cardiomyopathy    Poor social situation    Localized swelling on right hand    Acetaminophen toxicity    Hypokalemia    Encephalopathy    Suspected pneumonia    Foot ulcer (HCC)    Presence of retained hardware    Elevated CK    Hypertension    Hypothyroidism      Counseling / Coordination of Care  Total floor / unit time spent today 20 minutes  Greater than 50% of total time was spent with the patient and / or family counseling and / or coordination of care  ** Please Note: Dragon 360 Dictation voice to text software may have been used in the creation of this document   **

## 2020-06-29 NOTE — ASSESSMENT & PLAN NOTE
Wt Readings from Last 3 Encounters:   06/28/20 79 2 kg (174 lb 9 7 oz)       · Echo with EF 35%, segmental wall motion abnormality, mild aortic, mitral and tricuspid regurgitation  · Was on IV Lasix - changed to PO on 06/28 - dose decreased to 40 mg daily today  · Will need eventual ischemic workup  · Plavix 75 mg daily as she is allergic to aspirin  · No statins due to transaminitis and elevated CK  · Continue metoprolol tartrate 100 mg q 12 hours  · No ACE/ARB due to acute kidney injury

## 2020-06-29 NOTE — SOCIAL WORK
CM received a call from [place] [phone], reported they will follow the Pt's case, and requested the completion of a COVID test, psych evaluation and a PASRR

## 2020-06-29 NOTE — CONSULTS
Vancomycin IV Pharmacy-to-Dose Consultation    Deann Aviles is a 66 y o  female who was receiving Vancomycin IV with management by the Pharmacy Consult service for treatment of skin-soft tissue infection, osteomyelitis, MRSA suspected, Pneumonia    The patient's Vancomycin therapy has been discontinued  Thank you for allowing us to take part in this patient's care  Pharmacy will sign-off now; please call or re-consult if there are any questions        Social Pulse, Pharmacist

## 2020-06-29 NOTE — ASSESSMENT & PLAN NOTE
· Has broken screw in right 2nd toe which is asymptomatic  · Was scheduled to have it removed by Dr Gennette Gosselin   · Podiatry plans to call him after bone scan an Doppler results are available to discuss plan

## 2020-06-30 ENCOUNTER — APPOINTMENT (INPATIENT)
Dept: RADIOLOGY | Facility: HOSPITAL | Age: 75
DRG: 441 | End: 2020-06-30
Payer: MEDICARE

## 2020-06-30 LAB
ALBUMIN SERPL BCP-MCNC: 2.7 G/DL (ref 3.5–5)
ALP SERPL-CCNC: 98 U/L (ref 46–116)
ALT SERPL W P-5'-P-CCNC: 306 U/L (ref 12–78)
ANION GAP SERPL CALCULATED.3IONS-SCNC: 6 MMOL/L (ref 4–13)
AST SERPL W P-5'-P-CCNC: 79 U/L (ref 5–45)
BASOPHILS # BLD AUTO: 0.09 THOUSANDS/ΜL (ref 0–0.1)
BASOPHILS NFR BLD AUTO: 1 % (ref 0–1)
BILIRUB SERPL-MCNC: 1.05 MG/DL (ref 0.2–1)
BUN SERPL-MCNC: 17 MG/DL (ref 5–25)
CALCIUM SERPL-MCNC: 8.9 MG/DL (ref 8.3–10.1)
CHLORIDE SERPL-SCNC: 98 MMOL/L (ref 100–108)
CO2 SERPL-SCNC: 34 MMOL/L (ref 21–32)
CREAT SERPL-MCNC: 0.82 MG/DL (ref 0.6–1.3)
EOSINOPHIL # BLD AUTO: 0.48 THOUSAND/ΜL (ref 0–0.61)
EOSINOPHIL NFR BLD AUTO: 2 % (ref 0–6)
ERYTHROCYTE [DISTWIDTH] IN BLOOD BY AUTOMATED COUNT: 13.2 % (ref 11.6–15.1)
GFR SERPL CREATININE-BSD FRML MDRD: 70 ML/MIN/1.73SQ M
GLUCOSE SERPL-MCNC: 127 MG/DL (ref 65–140)
HCT VFR BLD AUTO: 40.8 % (ref 34.8–46.1)
HGB BLD-MCNC: 12.6 G/DL (ref 11.5–15.4)
IMM GRANULOCYTES # BLD AUTO: 0.21 THOUSAND/UL (ref 0–0.2)
IMM GRANULOCYTES NFR BLD AUTO: 1 % (ref 0–2)
LYMPHOCYTES # BLD AUTO: 2.69 THOUSANDS/ΜL (ref 0.6–4.47)
LYMPHOCYTES NFR BLD AUTO: 14 % (ref 14–44)
MAGNESIUM SERPL-MCNC: 2.3 MG/DL (ref 1.6–2.6)
MCH RBC QN AUTO: 31.7 PG (ref 26.8–34.3)
MCHC RBC AUTO-ENTMCNC: 30.9 G/DL (ref 31.4–37.4)
MCV RBC AUTO: 103 FL (ref 82–98)
MONOCYTES # BLD AUTO: 2.78 THOUSAND/ΜL (ref 0.17–1.22)
MONOCYTES NFR BLD AUTO: 14 % (ref 4–12)
NEUTROPHILS # BLD AUTO: 13.41 THOUSANDS/ΜL (ref 1.85–7.62)
NEUTS SEG NFR BLD AUTO: 68 % (ref 43–75)
NRBC BLD AUTO-RTO: 0 /100 WBCS
PLATELET # BLD AUTO: 325 THOUSANDS/UL (ref 149–390)
PMV BLD AUTO: 12.6 FL (ref 8.9–12.7)
POTASSIUM SERPL-SCNC: 4.4 MMOL/L (ref 3.5–5.3)
PROT SERPL-MCNC: 7 G/DL (ref 6.4–8.2)
RBC # BLD AUTO: 3.98 MILLION/UL (ref 3.81–5.12)
SODIUM SERPL-SCNC: 138 MMOL/L (ref 136–145)
WBC # BLD AUTO: 19.66 THOUSAND/UL (ref 4.31–10.16)

## 2020-06-30 PROCEDURE — 97110 THERAPEUTIC EXERCISES: CPT

## 2020-06-30 PROCEDURE — 99232 SBSQ HOSP IP/OBS MODERATE 35: CPT | Performed by: INTERNAL MEDICINE

## 2020-06-30 PROCEDURE — 83735 ASSAY OF MAGNESIUM: CPT | Performed by: INTERNAL MEDICINE

## 2020-06-30 PROCEDURE — 80053 COMPREHEN METABOLIC PANEL: CPT | Performed by: NURSE PRACTITIONER

## 2020-06-30 PROCEDURE — 94760 N-INVAS EAR/PLS OXIMETRY 1: CPT

## 2020-06-30 PROCEDURE — 99233 SBSQ HOSP IP/OBS HIGH 50: CPT | Performed by: INTERNAL MEDICINE

## 2020-06-30 PROCEDURE — 85025 COMPLETE CBC W/AUTO DIFF WBC: CPT | Performed by: INTERNAL MEDICINE

## 2020-06-30 PROCEDURE — 99222 1ST HOSP IP/OBS MODERATE 55: CPT | Performed by: SURGERY

## 2020-06-30 PROCEDURE — 99221 1ST HOSP IP/OBS SF/LOW 40: CPT | Performed by: PSYCHIATRY & NEUROLOGY

## 2020-06-30 PROCEDURE — 97530 THERAPEUTIC ACTIVITIES: CPT

## 2020-06-30 RX ORDER — METOPROLOL SUCCINATE 50 MG/1
50 TABLET, EXTENDED RELEASE ORAL DAILY
Status: DISCONTINUED | OUTPATIENT
Start: 2020-06-30 | End: 2020-07-03 | Stop reason: HOSPADM

## 2020-06-30 RX ADMIN — HEPARIN SODIUM 5000 UNITS: 5000 INJECTION INTRAVENOUS; SUBCUTANEOUS at 06:59

## 2020-06-30 RX ADMIN — METOPROLOL TARTRATE 50 MG: 50 TABLET, FILM COATED ORAL at 08:17

## 2020-06-30 RX ADMIN — FUROSEMIDE 40 MG: 40 TABLET ORAL at 08:16

## 2020-06-30 RX ADMIN — METRONIDAZOLE 500 MG: 500 TABLET ORAL at 22:20

## 2020-06-30 RX ADMIN — METOPROLOL SUCCINATE 50 MG: 50 TABLET, EXTENDED RELEASE ORAL at 14:31

## 2020-06-30 RX ADMIN — HEPARIN SODIUM 5000 UNITS: 5000 INJECTION INTRAVENOUS; SUBCUTANEOUS at 14:27

## 2020-06-30 RX ADMIN — CEFEPIME HYDROCHLORIDE 1000 MG: 1 INJECTION, POWDER, FOR SOLUTION INTRAMUSCULAR; INTRAVENOUS at 12:07

## 2020-06-30 RX ADMIN — MELATONIN 6 MG: at 22:20

## 2020-06-30 RX ADMIN — METRONIDAZOLE 500 MG: 500 TABLET ORAL at 06:56

## 2020-06-30 RX ADMIN — CEFEPIME HYDROCHLORIDE 1000 MG: 1 INJECTION, POWDER, FOR SOLUTION INTRAMUSCULAR; INTRAVENOUS at 00:09

## 2020-06-30 RX ADMIN — METRONIDAZOLE 500 MG: 500 TABLET ORAL at 14:27

## 2020-06-30 RX ADMIN — CLOPIDOGREL BISULFATE 75 MG: 75 TABLET ORAL at 08:15

## 2020-06-30 RX ADMIN — POTASSIUM CHLORIDE 40 MEQ: 1500 TABLET, EXTENDED RELEASE ORAL at 08:15

## 2020-06-30 RX ADMIN — LEVOTHYROXINE SODIUM 125 MCG: 125 TABLET ORAL at 06:56

## 2020-06-30 RX ADMIN — HEPARIN SODIUM 5000 UNITS: 5000 INJECTION INTRAVENOUS; SUBCUTANEOUS at 22:20

## 2020-06-30 NOTE — ASSESSMENT & PLAN NOTE
· From volume overload with possible element of NSAID use and elevated CK  · Improved with creatinine down to 0 88 from 2 48 on presentation  · CK normalized  · Diuretics as above

## 2020-06-30 NOTE — ASSESSMENT & PLAN NOTE
· Reportedly approximately more than 3 weeks ago, she was in [facility] for a total of 3 weeks likewise due to inability to care for self  According to the niece who is in the room, her house is unkept and the utilities are turned off  It seems also that there may be some psychiatric concerns that prompted her admission  She was released approximately 3 weeks ago as mentioned, stayed the first 2 days in a hotel due to the fact that her house is not functional   After that, she has stayed with her niece until today    · Niece states they have area on aging involved to assist with social support

## 2020-06-30 NOTE — PLAN OF CARE
Problem: PHYSICAL THERAPY ADULT  Goal: Performs mobility at highest level of function for planned discharge setting  See evaluation for individualized goals  Description  Treatment/Interventions: Functional transfer training, LE strengthening/ROM, Therapeutic exercise, Endurance training, Cognitive reorientation, Patient/family training, Equipment eval/education, Bed mobility, Gait training, Spoke to nursing  Equipment Recommended: Yakov Ricketts       See flowsheet documentation for full assessment, interventions and recommendations  Outcome: Progressing  Note:   Prognosis: Fair  Problem List: Decreased strength, Decreased range of motion, Decreased endurance, Impaired balance, Decreased mobility, Decreased coordination, Decreased cognition, Impaired judgement, Decreased safety awareness  Assessment: Patient showing improvements this session with bed mobility and transfers, less assistance required  Patient initially required encouragement to participate in session  Patient was mod assist for sit to stand transfers with patient able to ambulate few steps once standing, minimal assist x2, HHA  Patient showing improvements with functional mobility, PT to see next visit for further ambulation goals  Patient required cues for proper hand placement upon transfers for safety  Patient would continue to benefit from physical therapy to improve functional mobility until medically cleared  PT Discharge Recommendation: Post-Acute Rehabilitation Services     PT - OK to Discharge: (S) Yes(once medically cleared to rehab)    See flowsheet documentation for full assessment

## 2020-06-30 NOTE — ASSESSMENT & PLAN NOTE
· Ulceration which probes to bone noted over the dorsal aspect of the right 3rd digit  · Lower extremity doppler - 50 - 75% stenosis in the tibial peroneal trunk bilaterally with evidence of tibio-peroneal disease  Diffuse disease without evidence of hemodyndamically significant stenosis in the femoral to popliteal arteries    · Completed bone scan - f/u results  · Vascular consulted by podiatry - plan for RLE angiogram

## 2020-06-30 NOTE — PHYSICAL THERAPY NOTE
Physical Therapy Progress Note     06/30/20 1323   Pain Assessment   Pain Assessment Tool 0-10   Pain Score No Pain   Restrictions/Precautions   Weight Bearing Precautions Per Order No   Other Precautions Bed Alarm;Cognitive; Fall Risk;Telemetry   General   Chart Reviewed Yes   Response to Previous Treatment Patient with no complaints from previous session  Family/Caregiver Present No   Cognition   Overall Cognitive Status Impaired   Arousal/Participation Responsive;Arousable   Attention Attends with cues to redirect   Following Commands Follows one step commands inconsistently   Subjective   Subjective Patient in bed, initially refusing physical therapy stating "i am tired " Therapist educated patient on importance of increased activity with patient then becoming agreeable  Cleared by RN for session  Bed Mobility   Supine to Sit 3  Moderate assistance   Additional items Assist x 2; Increased time required;Verbal cues;LE management   Sit to Supine 3  Moderate assistance   Additional items Assist x 2; Increased time required;Verbal cues;LE management   Transfers   Sit to Stand 3  Moderate assistance  (mod/min)   Additional items Assist x 1; Increased time required;Verbal cues  (standby assist of 2nd )   Stand to Sit 3  Moderate assistance  (mod/min)   Additional items Assist x 1; Increased time required;Verbal cues  (standby assist of 2nd )   Ambulation/Elevation   Gait pattern Excessively slow; Step to;Decreased foot clearance; Foward flexed; Inconsistent luciano   Gait Assistance 4  Minimal assist   Additional items Assist x 2   Assistive Device Other (Comment)  (HHA)   Distance 5ft x2    Balance   Static Sitting Fair -   Dynamic Sitting Poor +   Static Standing Poor +   Dynamic Standing Poor +   Ambulatory Poor +   Endurance Deficit   Endurance Deficit Yes   Endurance Deficit Description fatigue, weakness   Activity Tolerance   Activity Tolerance Patient limited by fatigue   Nurse Made Aware appropriate to see Exercises   Hip Abduction Sitting;20 reps;AROM; Bilateral   Hip Adduction Sitting;20 reps;AROM; Bilateral   Knee AROM Long Arc Quad Sitting;20 reps;AROM; Bilateral   Ankle Pumps Sitting;20 reps;AROM; Bilateral   Marching Sitting;20 reps;AROM; Bilateral   Assessment   Prognosis Fair   Problem List Decreased strength;Decreased range of motion;Decreased endurance; Impaired balance;Decreased mobility; Decreased coordination;Decreased cognition; Impaired judgement;Decreased safety awareness   Assessment Patient showing improvements this session with bed mobility and transfers, less assistance required  Patient initially required encouragement to participate in session  Patient was mod assist for sit to stand transfers with patient able to ambulate few steps once standing, minimal assist x2, HHA  Patient showing improvements with functional mobility, PT to see next visit for further ambulation goals  Patient required cues for proper hand placement upon transfers for safety  Patient would continue to benefit from physical therapy to improve functional mobility until medically cleared  Goals   Patient Goals to go home   STG Expiration Date 07/06/20   Short Term Goal #1 pt will: Increase bilateral LE strength 1/2 grade to facilitate independent mobility, Perform all bed mobility tasks w/ supervision to decrease fall risk factors, Perform all transfers w/ supervision to improve independence, and Improve Barthel Index score to 35 or greater to facilitate independence   PT Treatment Day 1   Plan   Treatment/Interventions Functional transfer training;LE strengthening/ROM; Therapeutic exercise; Endurance training;Gait training;Spoke to nursing   Progress Progressing toward goals   PT Frequency Other (Comment)  (3-5x/wk)   Recommendation   PT Discharge Recommendation Post-Acute Rehabilitation Services   Equipment Recommended Walker   PT - OK to Discharge Yes  (once medically cleared to rehab)     Elvira Smith PTA

## 2020-06-30 NOTE — RESTORATIVE TECHNICIAN NOTE
Restorative Specialist Mobility Note       Activity: Ambulate in room, Chair, [address] privileges, Dangle, Stand at bedside(Educated/encouraged pt to ambulate with assistance 3-4 x's/day  Bed alarm on   Pt callbell, phone/tray within reach )     Assistive Device: Other (Comment)(HHA x2)       Edda KING, Restorative Technician,

## 2020-06-30 NOTE — ASSESSMENT & PLAN NOTE
· Has broken screw in right 2nd toe which is asymptomatic  · Was scheduled to have it removed by Dr Valentino Alice   · Podiatry plans to call him after bone scan an Doppler results are available to discuss plan

## 2020-06-30 NOTE — PROGRESS NOTES
Progress Note - Daljit Foster 1945, 66 y o  female MRN: [id number]    [facility] Encounter: [id number]    Primary Care Provider: No primary care provider on file  Date and time admitted to hospital: 6/25/2020  3:20 PM        Presence of retained hardware  Assessment & Plan  · Has broken screw in right 2nd toe which is asymptomatic  · Was scheduled to have it removed by Dr Caesar Joyner   · Podiatry plans to call him after bone scan an Doppler results are available to discuss plan      Suspected pneumonia  Assessment & Plan  · CT chest with bilateral centrally oriented alveolar opacities   · Opacities mainly due to volume overload however has elevated procalcitonin and leukocytosis  · Was found unresponsive - concern for aspiration  · Ct Cefepime/metronidazole(D#4/5)   · MRSA swab negative  · Vancomycin discontinuedFollow-up blood cultures  · Monitor temperature, WBC  · Trend procalcitonin    Localized swelling on right hand  Assessment & Plan  · Doppler ordered    Poor social situation  Assessment & Plan  · Reportedly approximately more than 3 weeks ago, she was in [facility] for a total of 3 weeks likewise due to inability to care for self  According to the niece who is in the room, her house is unkept and the utilities are turned off  It seems also that there may be some psychiatric concerns that prompted her admission  She was released approximately 3 weeks ago as mentioned, stayed the first 2 days in a hotel due to the fact that her house is not functional   After that, she has stayed with her niece until today  · Niece states they have area on aging involved to assist with social support     Leukocytosis  Assessment & Plan    Continue to trend   Workup wound as above with podiatry  Possible Ceretec scan    Transaminitis  Assessment & Plan  · Most likely as a result of chronic Tylenol toxicity as patient likes taking Tylenol p m  without telling others    She is also on some medications which are metabolized hepatically  Will discontinue those medications  Hold off on any Tylenol  · GI consult  · Toxicology consult   · Repeat cmp and pt inr- if increased reorder nac if decreasing let It  which is scheduled for 11 p to   · Am labs         VTE Pharmacologic Prophylaxis:   Pharmacologic: Heparin  Mechanical VTE Prophylaxis in Place: No    Patient Centered Rounds: I have performed bedside rounds with nursing staff today  Time Spent for Care: 15 minutes  More than 50% of total time spent on counseling and coordination of care as described above  Current Length of Stay: 5 day(s)    Current Patient Status: Inpatient   Certification Statement: The patient will continue to require additional inpatient hospital stay due to Need to monitor symptoms    Code Status: Level 1 - Full Code      Subjective:   No acute distress    Objective:     Vitals:   Temp (24hrs), Av 2 °F (36 2 °C), Min:96 8 °F (36 °C), Max:98 °F (36 7 °C)    Temp:  [96 8 °F (36 °C)-98 °F (36 7 °C)] 98 °F (36 7 °C)  HR:  [79-90] 81  Resp:  [14] 14  BP: (121-138)/(57-69) 135/62  SpO2:  [91 %-95 %] 94 %  Body mass index is 31 47 kg/m²  Input and Output Summary (last 24 hours): Intake/Output Summary (Last 24 hours) at 2020 1133  Last data filed at 2020 0858  Gross per 24 hour   Intake 110 ml   Output 1000 ml   Net -890 ml       Physical Exam:     Physical Exam   Constitutional: She is oriented to person, place, and time  HENT:   Head: Normocephalic and atraumatic  Eyes: Pupils are equal, round, and reactive to light  EOM are normal    Neck: Normal range of motion  Neck supple  Cardiovascular: Normal rate and regular rhythm  Pulmonary/Chest: Effort normal    Abdominal: Soft  Bowel sounds are normal    Musculoskeletal: Normal range of motion  She exhibits no edema  Neurological: She is alert and oriented to person, place, and time         Additional Data:     Labs:    Results from last 7 days   Lab Units 06/30/20  0449   WBC Thousand/uL 19 66*   HEMOGLOBIN g/dL 12 6   HEMATOCRIT % 40 8   PLATELETS Thousands/uL 325   NEUTROS PCT % 68   LYMPHS PCT % 14   MONOS PCT % 14*   EOS PCT % 2     Results from last 7 days   Lab Units 06/30/20  0449   POTASSIUM mmol/L 4 4   CHLORIDE mmol/L 98*   CO2 mmol/L 34*   BUN mg/dL 17   CREATININE mg/dL 0 82   CALCIUM mg/dL 8 9   ALK PHOS U/L 98   ALT U/L 306*   AST U/L 79*     Results from last 7 days   Lab Units 06/29/20  0501   INR  1 19       * I Have Reviewed All Lab Data Listed Above  * Additional Pertinent Lab Tests Reviewed: All Labs Within Last 24 Hours Reviewed        Recent Cultures (last 7 days):     Results from last 7 days   Lab Units 06/27/20  1104 06/26/20  1121 06/26/20  1112   BLOOD CULTURE   --  No Growth at 72 hrs  No Growth at 72 hrs  LEGIONELLA URINARY ANTIGEN  Negative  --   --        Last 24 Hours Medication List:     Current Facility-Administered Medications:  albuterol 2 5 mg Nebulization Q4H PRN Elisabeth Nguyen MD    aluminum-magnesium hydroxide-simethicone 5 mL Oral Q6H PRN Nicole Pelayo MD    cefepime 1,000 mg Intravenous Q12H Elisabeth Nguyen MD Last Rate: 1,000 mg (06/30/20 0009)   clopidogrel 75 mg Oral Daily Eliezer Gagnon MD    furosemide 40 mg Oral Daily YG Yepez    heparin (porcine) 5,000 Units Subcutaneous Q8H [address] Nicole Pelayo MD    levothyroxine 125 mcg Oral Early Morning Nicole Pelayo MD    melatonin 6 mg Oral HS Elisabeth Nguyen MD    metoprolol tartrate 50 mg Oral Q12H Inell MD Larissa    metroNIDAZOLE 500 mg Oral [facility] Elisabeth Nguyen MD    ondansetron 4 mg Intravenous Q6H PRN Nicole Pelayo MD    potassium chloride 40 mEq Oral Daily YG Yepez         Today, Patient Was Seen By: Monica Elizondo DO    ** Please Note: Dictation voice to text software may have been used in the creation of this document   **

## 2020-06-30 NOTE — ASSESSMENT & PLAN NOTE
Wt Readings from Last 3 Encounters:   06/29/20 74 kg (163 lb 2 3 oz)       · Echo with EF 35%, segmental wall motion abnormality, mild aortic, mitral and tricuspid regurgitation  · Volume overload initially - improved with IV Lasix   · Euvolemic at present   · On Lasix 40 mg daily  · Will need eventual ischemic workup  · Plavix 75 mg daily as she is allergic to aspirin  · No statins due to transaminitis   · Continue metoprolol tartrate - dose decreased to 50 mg q 12h due to hypotension  · No ACE/ARB due to acute kidney injury

## 2020-06-30 NOTE — ASSESSMENT & PLAN NOTE
· Has broken screw in right 2nd toe which is asymptomatic  · Was scheduled to have it removed by Dr Angelito Solano   · Podiatry plans to call him after bone scan an Doppler results are available to discuss plan

## 2020-06-30 NOTE — PROGRESS NOTES
[facility] Podiatry - Progress Note  Patient: Deann Aviles 66 y o  female   MRN: [id number]  PCP: No primary care provider on file  Unit/Bed#: [facility] 724-01 Encounter: [id number]  Date Of Visit: 20    ASSESSMENT:    Deann Aviles is a 66 y o  female with:    1  Ulceration dorsal aspect right 3rd digit - probes to bone  2  Broken retained hardware (screw) right 2nd toe - asymptomatic  3  Acute hepatic encephalopathy      PLAN:    · Ceretec scan completed, official read shows no signs of osteomyelitis LEADs show poor healing potential and vascular surgery currently suggesting arteriogram  · Given ceretec results seems appropriate to allow outpatient hardware removal with original surgeon once discharged from hospital  · Continue local wound care with Dermagran gauze dry dressing  ·  WBAT bilaterally    · Rest of care per primary team        SUBJECTIVE:     The patient was seen, evaluated, and assessed at bedside today  The patient was awake, alert, and in no acute distress  No acute events overnight  The patient reports she is comfortable with no complaints  Patient denies N/V/F/chills/SOB/CP  OBJECTIVE:     Vitals:   /62   Pulse 81   Temp 98 °F (36 7 °C) (Oral)   Resp 14   Ht 5' (1 524 m)   Wt 73 1 kg (161 lb 2 5 oz)   SpO2 94%   BMI 31 47 kg/m²     Temp (24hrs), Av 2 °F (36 2 °C), Min:96 8 °F (36 °C), Max:98 °F (36 7 °C)      Physical Exam :     General:  Alert, cooperative, and in no distress  Lower extremity exam:  Cardiovascular status at baseline  Neurological status at baseline  Musculoskeletal status at baseline  No calf tenderness noted bilaterally  Wound (1) located 3rd proximal interphalangeal joint right foot, measures approximately 0 3 cm x 0 5 cm x 0 0 cm  without sinus tracking or undermining  Wound bed appears eschar with no drainage  Deepest tissue noted in base is eschar  Malodor is not noticed  Wound edge appears Attached  Autumn-wound skin appears intact   Probe to bone is negative   Signs of infection are not present at this time  Clinical Images 06/30/20: Additional Data:     Labs:    Results from last 7 days   Lab Units 06/30/20  0449   WBC Thousand/uL 19 66*   HEMOGLOBIN g/dL 12 6   HEMATOCRIT % 40 8   PLATELETS Thousands/uL 325   NEUTROS PCT % 68   LYMPHS PCT % 14   MONOS PCT % 14*   EOS PCT % 2     Results from last 7 days   Lab Units 06/30/20  0449   POTASSIUM mmol/L 4 4   CHLORIDE mmol/L 98*   CO2 mmol/L 34*   BUN mg/dL 17   CREATININE mg/dL 0 82   CALCIUM mg/dL 8 9   ALK PHOS U/L 98   ALT U/L 306*   AST U/L 79*     Results from last 7 days   Lab Units 06/29/20  0501   INR  1 19       * I Have Reviewed All Lab Data Listed Above  Recent Cultures (last 7 days):     Results from last 7 days   Lab Units 06/27/20  1104 06/26/20  1121 06/26/20  1112   BLOOD CULTURE   --  No Growth at 72 hrs  No Growth at 72 hrs  LEGIONELLA URINARY ANTIGEN  Negative  --   --            Imaging: I have personally reviewed pertinent films in PACS  Pathology, and Other Studies: I have personally reviewed pertinent reports  ** Please Note: Portions of the record may have been created with voice recognition software  Occasional wrong word or "sound a like" substitutions may have occurred due to the inherent limitations of voice recognition software  Read the chart carefully and recognize, using context, where substitutions have occurred   **
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
## 2020-06-30 NOTE — PROGRESS NOTES
Progress Note- Mayelin Kumari 66 y o  female MRN: [id number]  Unit/Bed#: [facility] 724-01 Encounter: [id number]    Assessment and Plan:  Mayelin Kumari is a 66 y o  female with PMH:  HTN, depression, hypothyroidism who presented for altered mental status found to have acute hepatic encephalopathy in setting of transaminitis       #Acute Hepatic Encephalopathy - Resolved  #Transaminitis - Improving  Patient continued to have good cognition at this point  She is awake and alert with no evidence worsening encephalopathy at this time  Patient noted normal INR and LFTs continue to down trend with almost normalization of transaminitis  Patient responded well to NAC therapy  Primary etiology for acute transaminitis is likely acetaminophen toxicity  Patient had a right upper quadrant ultrasound with Dopplers which was normal with no evidence of portal vein thrombus  Plan:   -chronic hepatitis work-up as outpatient               -f/u Fe panel, alpha-1, HFE gene               ARMAND normal, Ceruloplasmin normal, LKM negative   -CMV IgM normal, EBV IgM negative   -CMP, INR qdaily    -Patient is not transplant candidate at this time given her age  -avoid hepatotoxic medications    GI team will sign off at this time, please do not hesitate to contact us with any further questions or concerns about the patient at any point  GI team will f/u for chronic work-up as outpatient      ______________________________________________________________________    Subjective:     Patient no acute complaints overnight  States she is feeling well  Denies any fever chills nausea vomiting  No abdominal pain and source at this time  No blood in stool      Medication Administration - last 24 hours from 06/29/2020 0945 to 06/30/2020 0945       Date/Time Order Dose Route Action Action by     06/30/2020 0656 levothyroxine tablet 125 mcg 125 mcg Oral Given Vidal Larson RN     06/30/2020 6693 heparin (porcine) subcutaneous injection 5,000 Units 5,000 Units Subcutaneous Given Pop Boyd, RN     06/29/2020 2225 heparin (porcine) subcutaneous injection 5,000 Units 5,000 Units Subcutaneous Given Popbrandon Boyd, RN     06/29/2020 1414 heparin (porcine) subcutaneous injection 5,000 Units 5,000 Units Subcutaneous Given Clavdm Mack, RN     06/30/2020 0009 cefepime (MAXIPIME) 1,000 mg in dextrose 5 % 50 mL IVPB 1,000 mg Intravenous New Bag Pop Boyd, RN     06/29/2020 1145 cefepime (MAXIPIME) 1,000 mg in dextrose 5 % 50 mL IVPB 0 mg Intravenous Stopped Martir Mack, RN     06/29/2020 1108 cefepime (MAXIPIME) 1,000 mg in dextrose 5 % 50 mL IVPB 1,000 mg Intravenous [facility] Marlen Pop Silva, RN     06/30/2020 8811 metroNIDAZOLE (FLAGYL) tablet 500 mg 500 mg Oral Given Pop Boyd, RN     06/29/2020 2225 metroNIDAZOLE (FLAGYL) tablet 500 mg 500 mg Oral Given Pop Boyd, RN     06/29/2020 1414 metroNIDAZOLE (FLAGYL) tablet 500 mg 500 mg Oral Given Martir Mack, RN     06/30/2020 0815 clopidogrel (PLAVIX) tablet 75 mg 75 mg Oral Given Ling Pugh, RN     06/29/2020 2232 melatonin tablet 6 mg 6 mg Oral Given Pop Boyd, RN     06/30/2020 3376 furosemide (LASIX) tablet 40 mg 40 mg Oral Given Ling Pugh, RN     06/29/2020 1706 potassium chloride (K-DUR,KLOR-CON) CR tablet 40 mEq 40 mEq Oral Given Martir aMck, RN     06/29/2020 1108 potassium chloride (K-DUR,KLOR-CON) CR tablet 40 mEq 40 mEq Oral Given Martir Mack, RN     06/30/2020 0815 potassium chloride (K-DUR,KLOR-CON) CR tablet 40 mEq 40 mEq Oral Given Ling Pugh, RN     06/30/2020 4950 metoprolol tartrate (LOPRESSOR) tablet 50 mg 50 mg Oral Given Ling Pugh, RN     06/29/2020 2225 metoprolol tartrate (LOPRESSOR) tablet 50 mg 50 mg Oral Given Pop Boyd, RN     06/29/2020 1413 magnesium sulfate 2 g/50 mL IVPB (premix) 2 g 2 g Intravenous New Bag Sharon Silva RN          Objective:     Vitals: Blood pressure 135/62, pulse 81, temperature 98 °F (36 7 °C), temperature source Oral, resp  rate 14, height 5' (1 524 m), weight 73 1 kg (161 lb 2 5 oz), SpO2 94 %  ,Body mass index is 31 47 kg/m²  Intake/Output Summary (Last 24 hours) at 6/30/2020 0945  Last data filed at 6/30/2020 0858  Gross per 24 hour   Intake 330 ml   Output 1000 ml   Net -670 ml       Physical Exam:   General Appearance:    elderly female   HEENT:   Normocephalic, atraumatic, anicteric  Neck:  Supple, symmetrical, trachea midline   Lungs:   Clear to auscultation bilaterally; no rales, rhonchi or wheezing; respirations unlabored    Heart[de-identified]   Regular rate and rhythm; no murmur, rub, or gallop  Abdomen:    soft nontender on palpation   Genitalia:   Deferred    Rectal:   Deferred    Extremities:  No cyanosis, clubbing or edema    Pulses:  2+ and symmetric all extremities    Skin:  No jaundice, rashes, or lesions    Lymph nodes:  No palpable cervical lymphadenopathy        Invasive Devices     Peripheral Intravenous Line            Peripheral IV 06/27/20 Right Antecubital 2 days          Drain            External Urinary Catheter 4 days                Lab Results:  No results displayed because visit has over 200 results  Imaging Studies: I have personally reviewed pertinent imaging studies        ---------------------------------------------------  Note Electronically Signed By:    MD Luis Eduardo Cheney 71 Gastroenterology Fellow PGY-4  PI #: [id number]

## 2020-06-30 NOTE — ASSESSMENT & PLAN NOTE
· BP was on lower side this am - furosemide, Metoprolol am dose and Amlodipine held with improvement   · Continue furosemide 40 mg daily, decrease dose of Metoprolol tartrate to 50 mg q 12 hours  · Amlodipine D/C'd

## 2020-06-30 NOTE — PROGRESS NOTES
NEPHROLOGY PROGRESS NOTE   Yamile Payton 66 y o  female MRN: [id number]  Unit/Bed#: [facility] 724-01 Encounter: [id number]    ASSESSMENT & PLAN:  57-year-old female with history of hypertension was found down and confused and brought to the hospital   Nephrology consulted for acute kidney injury  Admission creatinine 2 48  Acute kidney injury, POA  -admission creatinine 2 48, baseline not known  -Suspected to be multifactorial - component of prerenal azotemia from fluid overload + possible heavy NSAID use + elevated CK -1194 on 06/25 and then trended down  UA is not consistent with a GN or AIN but does have moderate blood with only 2-4 RBCs  No hydronephrosis on CT imaging    -renal function continue to improve, creatinine down to 0 8  -continue current dose of diuretics, Lasix 40 mg daily  Dose was decreased on 06/28  Clinically appears euvolemic  -avoid nephrotoxins, hypotension  Monitor renal function    Acute systolic CHF:  Ejection fraction 35%/new onset cardiomyopathy, cardiology on board  - Volume status improved  Continue Lasix 40 mg daily  Dose was decreased on 06/28    Hypokalemia- resolved with replacement   Hypo magnesimia:  Resolved with replacement    Primary hypertension:    -Blood pressure acceptable, continue current medication-  metoprolol and Lasix  Off amlodipine    Elevated CK/rhabdomyolysis:  CK 1194 on admission and trended down to 281  Likely due to being down for extended period of time  Encephalopathy-improving  Suspected to have acute hepatic encephalopathy from chronic Tylenol toxicity, status post NAC  LFTs improving  Acute respiratory failure with hypoxia and hypercapnia-due to fluid overload  Improving  Elevated troponin:  Cardiology following  New onset cardiomyopathy with ejection fraction 35%, will require ischemic evaluation as outpatient  Suspected pneumonia -on antibiotics    Nonhealing right foot ulcer:  Seen by vascular surgery no plan for further workup or intervention    Discussed with primary team- Dr Niko Rod:  No new complaint  No shortness of breath  No chest pain    OBJECTIVE:  Current Weight: Weight - Scale: 73 1 kg (161 lb 2 5 oz)  Vitals:    06/30/20 0820   BP: 135/62   Pulse: 81   Resp:    Temp:    SpO2: 94%       Intake/Output Summary (Last 24 hours) at 6/30/2020 1337  Last data filed at 6/30/2020 1331  Gross per 24 hour   Intake 280 ml   Output 1000 ml   Net -720 ml       Physical Exam  General:  Ill looking, awake  Eyes: Conjunctivae pink,  Sclera anicteric  ENT: lips and mucous membranes moist  Neck: supple   Chest: Clear to Auscultation both lungs,  no crackles, ronchus or wheezing  CVS: S1 & S2 present, normal rate, regular rhythm, no murmur    Abdomen: soft, non-tender, non-distended, Bowel sounds normoactive  Extremities: no edema of  legs  Skin: no rash  Neuro: awake, alert, oriented x 3       Medications:    Current Facility-Administered Medications:     albuterol inhalation solution 2 5 mg, 2 5 mg, Nebulization, Q4H PRN, Arin Blair MD    aluminum-magnesium hydroxide-simethicone (MYLANTA) 200-200-20 mg/5 mL oral suspension 5 mL, 5 mL, Oral, Q6H PRN, Porsche Barker MD    cefepime (MAXIPIME) 1,000 mg in dextrose 5 % 50 mL IVPB, 1,000 mg, Intravenous, Q12H, Arin Blair MD, Last Rate: 100 mL/hr at 06/30/20 1207, 1,000 mg at 06/30/20 1207    clopidogrel (PLAVIX) tablet 75 mg, 75 mg, Oral, Daily, Niharika Snyder MD, 75 mg at 06/30/20 0815    furosemide (LASIX) tablet 40 mg, 40 mg, Oral, Daily, YG Griffin, 40 mg at 06/30/20 0816    heparin (porcine) subcutaneous injection 5,000 Units, 5,000 Units, Subcutaneous, Q8H [address], 5,000 Units at 06/30/20 0659 **AND** [COMPLETED] Platelet count, , , Once, Porsche Barker MD    levothyroxine tablet 125 mcg, 125 mcg, Oral, Early Morning, Porsche Barker MD, 125 mcg at 06/30/20 0656    melatonin tablet 6 mg, 6 mg, Oral, HS, Arin Blair MD, 6 mg at 06/29/20 2232    metoprolol succinate (TOPROL-XL) 24 hr tablet 50 mg, 50 mg, Oral, Daily, Prince Bunch MD    metroNIDAZOLE (FLAGYL) tablet 500 mg, 500 mg, Oral, Q8H [facility], Jerad Romeo MD, 500 mg at 06/30/20 0656    [facility]ansetron [facility]) injection 4 mg, 4 mg, Intravenous, Q6H PRN, Devan Cabrera MD    potassium chloride (K-DUR,KLOR-CON) CR tablet 40 mEq, 40 mEq, Oral, Daily, Vivian Douse, CRNP, 40 mEq at 06/30/20 0815    Invasive Devices:        Lab Results:   Results from last 7 days   Lab Units 06/30/20  0449 06/29/20  0501 06/28/20  0500  06/27/20  0503  06/26/20  0528   WBC Thousand/uL 19 66*  --  14 87*  --  16 86*  --  23 63*   HEMOGLOBIN g/dL 12 6  --  11 9  --  11 9  --  11 5   HEMATOCRIT % 40 8  --  37 9  --  36 8  --  35 8   PLATELETS Thousands/uL 325  --  287  --  268  --  257   POTASSIUM mmol/L 4 4 2 8* 3 0*   < > 2 4*   < > 2 8*   CHLORIDE mmol/L 98* 92* 97*   < > 102   < > 104   CO2 mmol/L 34* 39* 35*   < > 36*   < > 25   BUN mg/dL 17 21 34*   < > 47*   < > 52*   CREATININE mg/dL 0 82 0 88 1 13   < > 1 40*   < > 1 91*   CALCIUM mg/dL 8 9 8 1* 8 1*   < > 8 2*   < > 7 8*   MAGNESIUM mg/dL 2 3 1 4*  --   --  1 7  --  2 1   PHOSPHORUS mg/dL  --   --   --   --  2 2*  --  2 9   ALK PHOS U/L 98 102 95  --  93   < > 89   ALT U/L 306* 494* 794*  --  1,096*   < > 1,473*   AST U/L 79* 132* 321*  --  604*   < > 1,293*    < > = values in this interval not displayed  Previous work up:         Portions of the record may have been created with voice recognition software  Occasional wrong word or "sound a like" substitutions may have occurred due to the inherent limitations of voice recognition software  Read the chart carefully and recognize, using context, where substitutions have occurred  If you have any questions, please contact the dictating provider

## 2020-06-30 NOTE — PROGRESS NOTES
Progress Note - Daljit Foster 1945, 66 y o  female MRN: [id number]    [facility] Encounter: [id number]    Primary Care Provider: No primary care provider on file     Date and time admitted to hospital: 6/25/2020  3:20 PM        * Acute hepatic encephalopathy  Assessment & Plan  · Patient was found unresponsive   · Hepatic encephalopathy likely due to chronic acetaminophen toxicity  · S/p NAC   · Awake and alert at present  · Transaminases improving  · No hepatotoxic medications    Cardiomyopathy  Assessment & Plan  Wt Readings from Last 3 Encounters:   06/29/20 74 kg (163 lb 2 3 oz)       · Echo with EF 35%, segmental wall motion abnormality, mild aortic, mitral and tricuspid regurgitation  · Volume overload initially - improved with IV Lasix   · Euvolemic at present   · On Lasix 40 mg daily  · Will need eventual ischemic workup  · Plavix 75 mg daily as she is allergic to aspirin  · No statins due to transaminitis   · Continue metoprolol tartrate - dose decreased to 50 mg q 12h due to hypotension  · No ACE/ARB due to acute kidney injury    PHILIPPE (acute kidney injury) ([facility] [address] )  Assessment & Plan  · From volume overload with possible element of NSAID use and elevated CK  · Improved with creatinine down to 0 88 from 2 48 on presentation  · CK normalized  · Diuretics as above      Suspected pneumonia  Assessment & Plan  · CT chest with bilateral centrally oriented alveolar opacities   · Opacities mainly due to volume overload however has elevated procalcitonin and leukocytosis  · Was found unresponsive - concern for aspiration  · Ct Cefepime/metronidazole(D#3/5) - possible changed to PO  on 06/30  · MRSA swab negative  · Vancomycin discontinuedFollow-up blood cultures  · Monitor temperature, WBC  · Trend procalcitonin    Foot ulcer (HCC)  Assessment & Plan  · Ulceration which probes to bone noted over the dorsal aspect of the right 3rd digit  · Lower extremity doppler - 50 - 75% stenosis in the tibial peroneal trunk bilaterally with evidence of tibio-peroneal disease  Diffuse disease without evidence of hemodyndamically significant stenosis in the femoral to popliteal arteries  · Completed bone scan - f/u results  · Vascular consulted by podiatry - plan for RLE angiogram         Presence of retained hardware  Assessment & Plan  · Has broken screw in right 2nd toe which is asymptomatic  · Was scheduled to have it removed by Dr Caesar Joyner   · Podiatry plans to call him after bone scan an Doppler results are available to discuss plan      Acute respiratory failure with hypoxia and hypercapnia (HCC)  Assessment & Plan  · Due to acute CHF  · Wean O2 with diuresis    Elevated troponin  Assessment & Plan  · Troponin - 11 - 10 - 8 - 8  · Non MI troponin elevation in the setting of renal insufficiency, hypoxia, volume overload  · Echo with EF of 35%  · Eventual ischemic evaluation per Cardiology  · On Plavix, BB      Elevated CK  Assessment & Plan  · Normalized     Hypertension  Assessment & Plan  · BP was on lower side this am - furosemide, Metoprolol am dose and Amlodipine held with improvement   · Continue furosemide 40 mg daily, decrease dose of Metoprolol tartrate to 50 mg q 12 hours  · Amlodipine D/C'd    Hypothyroidism  Assessment & Plan  · TSH normal  · Continue levothyroxine 125 mcg daily    Hypokalemia  Assessment & Plan  · Replete    VTE Pharmacologic Prophylaxis:   Pharmacologic: Heparin  Mechanical VTE Prophylaxis in Place: Yes    Patient Centered Rounds: I have performed bedside rounds with nursing staff today  Education and Discussions with Family / Patient:  Discussed with daughter on the phone    Time Spent for Care: 20 minutes  More than 50% of total time spent on counseling and coordination of care as described above      Current Length of Stay: 5 day(s)    Current Patient Status: Inpatient   Certification Statement: The patient will continue to require additional inpatient hospital stay due to Right 3rd toe ulcer undergoing evaluation    Code Status: Level 1 - Full Code    Subjective:   No fever  Occasional cough  No shortness of breath or chest pain  Objective:     Vitals:   Temp (24hrs), Av 2 °F (36 2 °C), Min:96 4 °F (35 8 °C), Max:98 °F (36 7 °C)    Temp:  [96 4 °F (35 8 °C)-98 °F (36 7 °C)] 98 °F (36 7 °C)  HR:  [66-90] 90  Resp:  [14] 14  BP: (105-150)/(49-73) 137/61  SpO2:  [88 %-96 %] 95 %  Body mass index is 31 86 kg/m²  Physical Exam:     Physical Exam   Constitutional: She is oriented to person, place, and time  HENT:   Head: Normocephalic and atraumatic  Eyes: Pupils are equal, round, and reactive to light  EOM are normal    Neck: Normal range of motion  Neck supple  Cardiovascular: Normal rate and regular rhythm  Pulmonary/Chest: Effort normal and breath sounds normal    Abdominal: Soft  Bowel sounds are normal    Musculoskeletal: She exhibits no edema  Neurological: She is alert and oriented to person, place, and time  Skin: Skin is warm and dry  Psychiatric: She has a normal mood and affect         Additional Data:     Labs:    Results from last 7 days   Lab Units 20  0500   WBC Thousand/uL 14 87*   HEMOGLOBIN g/dL 11 9   HEMATOCRIT % 37 9   PLATELETS Thousands/uL 287   NEUTROS PCT % 69   LYMPHS PCT % 15   MONOS PCT % 13*   EOS PCT % 2     Results from last 7 days   Lab Units 20  0501   SODIUM mmol/L 137   POTASSIUM mmol/L 2 8*   CHLORIDE mmol/L 92*   CO2 mmol/L 39*   BUN mg/dL 21   CREATININE mg/dL 0 88   ANION GAP mmol/L 6   CALCIUM mg/dL 8 1*   ALBUMIN g/dL 3 0*   TOTAL BILIRUBIN mg/dL 1 06*   ALK PHOS U/L 102   ALT U/L 494*   AST U/L 132*   GLUCOSE RANDOM mg/dL 146*     Results from last 7 days   Lab Units 20  0501   INR  1 19     Results from last 7 days   Lab Units 20  1531   POC GLUCOSE mg/dl 160*         Results from last 7 days   Lab Units 20  0510 20  0500 20  2130 20  0903 20  1844 20  1649   LACTIC ACID mmol/L  --   --   --   --  1 9 3 8*   PROCALCITONIN ng/ml 1 13* 2 24* 6 24* 8 81*  --   --            * I Have Reviewed All Lab Data Listed Above  * Additional Pertinent Lab Tests Reviewed: Juwan Boswell Admission Reviewed      Recent Cultures (last 7 days):     Results from last 7 days   Lab Units 06/27/20  1104 06/26/20  1121 06/26/20  1112   BLOOD CULTURE   --  No Growth at 72 hrs  No Growth at 72 hrs  LEGIONELLA URINARY ANTIGEN  Negative  --   --        Last 24 Hours Medication List:     Current Facility-Administered Medications:  albuterol 2 5 mg Nebulization Q4H PRN Ko Gold MD    aluminum-magnesium hydroxide-simethicone 5 mL Oral Q6H PRN Antoni Fowler MD    cefepime 1,000 mg Intravenous Q12H Ko Gold MD Last Rate: Stopped (06/29/20 1145)   clopidogrel 75 mg Oral Daily Will Cummings MD    furosemide 40 mg Oral Daily YG Sosa    heparin (porcine) 5,000 Units Subcutaneous Q8H [facility] Antoni Fowler MD    levothyroxine 125 mcg Oral Early Morning Antoni Fowler MD    melatonin 6 mg Oral HS Ko Gold MD    metoprolol tartrate 50 mg Oral Q12H Jacinta Marti MD    metroNIDAZOLE 500 mg Oral [facility] Ko Gold MD    ondansetron 4 mg Intravenous Q6H PRN Antoni Fowler MD    potassium chloride 40 mEq Oral Daily YG Sosa         Today, Patient Was Seen By: Ko Gold MD    ** Please Note: Dictation voice to text software may have been used in the creation of this document   **

## 2020-06-30 NOTE — PROGRESS NOTES
Progress Note - Cardiology   Isra Splinter 66 y o  female MRN: [id number]  Unit/Bed#: [facility] 724-01 Encounter: [id number]        Principal Problem:    Acute hepatic encephalopathy  Active Problems:    PHILIPPE (acute kidney injury) ([address] )    Elevated troponin    Transaminitis    Elevated INR    Leukocytosis    Acute respiratory failure with hypoxia and hypercapnia (HCC)    Cardiomyopathy    Poor social situation    Localized swelling on right hand    Acetaminophen toxicity    Hypokalemia    Encephalopathy    Suspected pneumonia    Foot ulcer ([address] )    Presence of retained hardware    Elevated CK    Hypertension    Hypothyroidism      Assessment/ Plan        1  Acute systolic CHF/ EF 28%  Likely nonischemic and stress induced  S/P IV diuresis  Once clinically improved likely ischemia evaluation  For now medical management  Initially diuresed with IV Lasix, now transitioned to oral Lasix 40 mg daily  GDMT- BB/Ace held d/t PHILIPPE  No longer on telemetry     2  Elevated troponin likely representing non MI troponin elevation in setting of renal insufficiency, hypoxia, and volume overload/ CMP  ECG- NSR with nonspecific ST/T wave changes  Will need ischemic evaluation in setting of #4  Pt allergic to ASA, thus plavix started      3  PHILIPPE, resolved- Cr 2 48 on admission, now 0 88  Volume overload + possible heavy NSAID use     4  Transaminitis- improving  RUQ ultrasound without acute findings  Possibly due to acute CHF/volume overload vs acetaminophen toxicity- treated with acetylcysteine  Gastroenterology following      5  PAD  Arterial  duplex suggests arterial occlusive disease  No further vascular workup planned    6  Toe ulceration/retained hardware- podiatry followup     7  Hypothyroidism- TSH WNL     8  HTN- normotensive  On amlodipine 5 mg daily, Lasix 40 mg daily, and metoprolol tartrate 50 mg q12 hours          Subjective/Objective   Chief Complaint/Subjective  Pt without CP, SOB  Unclear of plan for toe  Vitals: /62   Pulse 81   Temp 98 °F (36 7 °C) (Oral)   Resp 14   Ht 5' (1 524 m)   Wt 73 1 kg (161 lb 2 5 oz)   SpO2 94%   BMI 31 47 kg/m²     Vitals:    06/29/20 0600 06/30/20 0600   Weight: 74 kg (163 lb 2 3 oz) 73 1 kg (161 lb 2 5 oz)     Orthostatic Blood Pressures      Most Recent Value   Blood Pressure  135/62 filed at 06/30/2020 0820   Patient Position - Orthostatic VS  Standing - Orthostatic VS filed at 06/26/2020 0729            Intake/Output Summary (Last 24 hours) at 6/30/2020 1012  Last data filed at 6/30/2020 0858  Gross per 24 hour   Intake 330 ml   Output 1000 ml   Net -670 ml       Invasive Devices     Peripheral Intravenous Line            Peripheral IV 06/27/20 Right Antecubital 2 days          Drain            External Urinary Catheter 4 days                Current Facility-Administered Medications   Medication Dose Route Frequency    albuterol inhalation solution 2 5 mg  2 5 mg Nebulization Q4H PRN    aluminum-magnesium hydroxide-simethicone (MYLANTA) 200-200-20 mg/5 mL oral suspension 5 mL  5 mL Oral Q6H PRN    cefepime (MAXIPIME) 1,000 mg in dextrose 5 % 50 mL IVPB  1,000 mg Intravenous Q12H    clopidogrel (PLAVIX) tablet 75 mg  75 mg Oral Daily    furosemide (LASIX) tablet 40 mg  40 mg Oral Daily    heparin (porcine) subcutaneous injection 5,000 Units  5,000 Units Subcutaneous Q8H [facility]    levothyroxine tablet 125 mcg  125 mcg Oral Early Morning    melatonin tablet 6 mg  6 mg Oral HS    metoprolol tartrate (LOPRESSOR) tablet 50 mg  50 mg Oral Q12H DANNY    metroNIDAZOLE (FLAGYL) tablet 500 mg  500 mg Oral Q8H [facility]    ondansetron (ZOFRAN) injection 4 mg  4 mg Intravenous Q6H PRN    potassium chloride (K-DUR,KLOR-CON) CR tablet 40 mEq  40 mEq Oral Daily         Physical Exam: /62   Pulse 81   Temp 98 °F (36 7 °C) (Oral)   Resp 14   Ht 5' (1 524 m)   Wt 73 1 kg (161 lb 2 5 oz)   SpO2 94%   BMI 31 47 kg/m²     General Appearance:    Alert, cooperative, no distress, appears stated age   Head:    Normocephalic, no scleral icterus   Eyes:    PERRL   Nose:   Nares normal, septum midline, no drainage    Throat:   Lips, mucosa, and tongue normal   Neck:   Supple, symmetrical, trachea midline,       noJVD       Lungs:     Clear to auscultation bilaterally, respirations unlabored   Chest Wall:    No tenderness or deformity    Heart:    Regular rate and rhythm, S1 and S2 normal, no murmur       Extremities:   Extremities normal, atraumatic, no cyanosis or edema       Skin:   Skin warm   Neurologic:   Alert and oriented to place                 Lab Results:   Recent Results (from the past 72 hour(s))   MRSA culture    Collection Time: 06/27/20 11:04 AM   Result Value Ref Range    MRSA Culture Only       No Methicillin Resistant Stapylococcus aureus (MRSA) after 24 hours   Legionella antigen, urine    Collection Time: 06/27/20 11:04 AM   Result Value Ref Range    Legionella Urinary Antigen Negative Negative   Strep Pneumoniae, Urine    Collection Time: 06/27/20 11:04 AM   Result Value Ref Range    Strep pneumoniae antigen, urine Negative Negative   Protime-INR    Collection Time: 06/27/20  2:35 PM   Result Value Ref Range    Protime 15 3 (H) 11 6 - 14 5 seconds    INR 1 21 (H) 0 84 - 7 83   Basic metabolic panel    Collection Time: 06/27/20  2:35 PM   Result Value Ref Range    Sodium 144 136 - 145 mmol/L    Potassium 3 4 (L) 3 5 - 5 3 mmol/L    Chloride 101 100 - 108 mmol/L    CO2 38 (H) 21 - 32 mmol/L    ANION GAP 5 4 - 13 mmol/L    BUN 41 (H) 5 - 25 mg/dL    Creatinine 1 50 (H) 0 60 - 1 30 mg/dL    Glucose 171 (H) 65 - 140 mg/dL    Calcium 8 5 8 3 - 10 1 mg/dL    eGFR 34 ml/min/1 73sq m   Procalcitonin    Collection Time: 06/28/20  5:00 AM   Result Value Ref Range    Procalcitonin 2 24 (H) <=0 25 ng/ml   Basic metabolic panel    Collection Time: 06/28/20  5:00 AM   Result Value Ref Range    Sodium 142 136 - 145 mmol/L    Potassium 3 0 (L) 3 5 - 5 3 mmol/L    Chloride 97 (L) 100 - 108 mmol/L    CO2 35 (H) 21 - 32 mmol/L    ANION GAP 10 4 - 13 mmol/L    BUN 34 (H) 5 - 25 mg/dL    Creatinine 1 13 0 60 - 1 30 mg/dL    Glucose 135 65 - 140 mg/dL    Calcium 8 1 (L) 8 3 - 10 1 mg/dL    eGFR 47 ml/min/1 73sq m   CBC and differential    Collection Time: 06/28/20  5:00 AM   Result Value Ref Range    WBC 14 87 (H) 4 31 - 10 16 Thousand/uL    RBC 3 79 (L) 3 81 - 5 12 Million/uL    Hemoglobin 11 9 11 5 - 15 4 g/dL    Hematocrit 37 9 34 8 - 46 1 %     (H) 82 - 98 fL    MCH 31 4 26 8 - 34 3 pg    MCHC 31 4 31 4 - 37 4 g/dL    RDW 13 2 11 6 - 15 1 %    MPV 13 1 (H) 8 9 - 12 7 fL    Platelets 353 025 - 803 Thousands/uL    nRBC 0 /100 WBCs    Neutrophils Relative 69 43 - 75 %    Immat GRANS % 1 0 - 2 %    Lymphocytes Relative 15 14 - 44 %    Monocytes Relative 13 (H) 4 - 12 %    Eosinophils Relative 2 0 - 6 %    Basophils Relative 0 0 - 1 %    Neutrophils Absolute 10 25 (H) 1 85 - 7 62 Thousands/µL    Immature Grans Absolute 0 10 0 00 - 0 20 Thousand/uL    Lymphocytes Absolute 2 25 0 60 - 4 47 Thousands/µL    Monocytes Absolute 1 87 (H) 0 17 - 1 22 Thousand/µL    Eosinophils Absolute 0 34 0 00 - 0 61 Thousand/µL    Basophils Absolute 0 06 0 00 - 0 10 Thousands/µL   Hepatic function panel    Collection Time: 06/28/20  5:00 AM   Result Value Ref Range    Total Bilirubin 0 62 0 20 - 1 00 mg/dL    Bilirubin, Direct 0 35 (H) 0 00 - 0 20 mg/dL    Alkaline Phosphatase 95 46 - 116 U/L     (H) 5 - 45 U/L     (H) 12 - 78 U/L    Total Protein 7 0 6 4 - 8 2 g/dL    Albumin 3 1 (L) 3 5 - 5 0 g/dL   Protime-INR    Collection Time: 06/29/20  5:01 AM   Result Value Ref Range    Protime 15 1 (H) 11 6 - 14 5 seconds    INR 1 19 0 84 - 7 48   Basic metabolic panel    Collection Time: 06/29/20  5:01 AM   Result Value Ref Range    Sodium 137 136 - 145 mmol/L    Potassium 2 8 (L) 3 5 - 5 3 mmol/L    Chloride 92 (L) 100 - 108 mmol/L    CO2 39 (H) 21 - 32 mmol/L    ANION GAP 6 4 - 13 mmol/L    BUN 21 5 - 25 mg/dL    Creatinine 0 88 0 60 - 1 30 mg/dL    Glucose 146 (H) 65 - 140 mg/dL    Calcium 8 1 (L) 8 3 - 10 1 mg/dL    eGFR 64 ml/min/1 73sq m   CK (with reflex to MB)    Collection Time: 06/29/20  5:01 AM   Result Value Ref Range    Total CK 62 26 - 192 U/L   Hepatic function panel    Collection Time: 06/29/20  5:01 AM   Result Value Ref Range    Total Bilirubin 1 06 (H) 0 20 - 1 00 mg/dL    Bilirubin, Direct 0 41 (H) 0 00 - 0 20 mg/dL    Alkaline Phosphatase 102 46 - 116 U/L     (H) 5 - 45 U/L     (H) 12 - 78 U/L    Total Protein 7 2 6 4 - 8 2 g/dL    Albumin 3 0 (L) 3 5 - 5 0 g/dL   Magnesium    Collection Time: 06/29/20  5:01 AM   Result Value Ref Range    Magnesium 1 4 (L) 1 6 - 2 6 mg/dL   Procalcitonin Reflex    Collection Time: 06/29/20  5:10 AM   Result Value Ref Range    Procalcitonin 1 13 (H) <=0 25 ng/ml   Comprehensive metabolic panel    Collection Time: 06/30/20  4:49 AM   Result Value Ref Range    Sodium 138 136 - 145 mmol/L    Potassium 4 4 3 5 - 5 3 mmol/L    Chloride 98 (L) 100 - 108 mmol/L    CO2 34 (H) 21 - 32 mmol/L    ANION GAP 6 4 - 13 mmol/L    BUN 17 5 - 25 mg/dL    Creatinine 0 82 0 60 - 1 30 mg/dL    Glucose 127 65 - 140 mg/dL    Calcium 8 9 8 3 - 10 1 mg/dL    AST 79 (H) 5 - 45 U/L     (H) 12 - 78 U/L    Alkaline Phosphatase 98 46 - 116 U/L    Total Protein 7 0 6 4 - 8 2 g/dL    Albumin 2 7 (L) 3 5 - 5 0 g/dL    Total Bilirubin 1 05 (H) 0 20 - 1 00 mg/dL    eGFR 70 ml/min/1 73sq m   Magnesium    Collection Time: 06/30/20  4:49 AM   Result Value Ref Range    Magnesium 2 3 1 6 - 2 6 mg/dL   CBC and differential    Collection Time: 06/30/20  4:49 AM   Result Value Ref Range    WBC 19 66 (H) 4 31 - 10 16 Thousand/uL    RBC 3 98 3 81 - 5 12 Million/uL    Hemoglobin 12 6 11 5 - 15 4 g/dL    Hematocrit 40 8 34 8 - 46 1 %     (H) 82 - 98 fL    MCH 31 7 26 8 - 34 3 pg    MCHC 30 9 (L) 31 4 - 37 4 g/dL    RDW 13 2 11 6 - 15 1 %    MPV 12 6 8 9 - 12 7 fL    Platelets 689 149 - 390 Thousands/uL    nRBC 0 /100 WBCs    Neutrophils Relative 68 43 - 75 %    Immat GRANS % 1 0 - 2 %    Lymphocytes Relative 14 14 - 44 %    Monocytes Relative 14 (H) 4 - 12 %    Eosinophils Relative 2 0 - 6 %    Basophils Relative 1 0 - 1 %    Neutrophils Absolute 13 41 (H) 1 85 - 7 62 Thousands/µL    Immature Grans Absolute 0 21 (H) 0 00 - 0 20 Thousand/uL    Lymphocytes Absolute 2 69 0 60 - 4 47 Thousands/µL    Monocytes Absolute 2 78 (H) 0 17 - 1 22 Thousand/µL    Eosinophils Absolute 0 48 0 00 - 0 61 Thousand/µL    Basophils Absolute 0 09 0 00 - 0 10 Thousands/µL     Imaging: I have personally reviewed pertinent reports  Counseling / Coordination of Care  Total time spent today 20 minutes  Greater than 50% of total time was spent with the patient and / or family counseling and / or coordination of care

## 2020-06-30 NOTE — ASSESSMENT & PLAN NOTE
· CT chest with bilateral centrally oriented alveolar opacities   · Opacities mainly due to volume overload however has elevated procalcitonin and leukocytosis  · Was found unresponsive - concern for aspiration  · Ct Cefepime/metronidazole(D#3/5) - possible changed to PO  on 06/30  · MRSA swab negative  · Vancomycin discontinuedFollow-up blood cultures  · Monitor temperature, WBC  · Trend procalcitonin

## 2020-06-30 NOTE — ASSESSMENT & PLAN NOTE
· CT chest with bilateral centrally oriented alveolar opacities   · Opacities mainly due to volume overload however has elevated procalcitonin and leukocytosis  · Was found unresponsive - concern for aspiration  · Ct Cefepime/metronidazole(D#4/5)   · MRSA swab negative  · Vancomycin discontinuedFollow-up blood cultures  · Monitor temperature, WBC  · Trend procalcitonin

## 2020-07-01 LAB
ANION GAP SERPL CALCULATED.3IONS-SCNC: 4 MMOL/L (ref 4–13)
ANION GAP SERPL CALCULATED.3IONS-SCNC: 6 MMOL/L (ref 4–13)
ANION GAP SERPL CALCULATED.3IONS-SCNC: 7 MMOL/L (ref 4–13)
BACTERIA BLD CULT: NORMAL
BACTERIA BLD CULT: NORMAL
BASOPHILS # BLD AUTO: 0.1 THOUSANDS/ΜL (ref 0–0.1)
BASOPHILS NFR BLD AUTO: 1 % (ref 0–1)
BUN SERPL-MCNC: 19 MG/DL (ref 5–25)
BUN SERPL-MCNC: 20 MG/DL (ref 5–25)
BUN SERPL-MCNC: 22 MG/DL (ref 5–25)
CALCIUM SERPL-MCNC: 8.6 MG/DL (ref 8.3–10.1)
CALCIUM SERPL-MCNC: 9 MG/DL (ref 8.3–10.1)
CALCIUM SERPL-MCNC: 9.2 MG/DL (ref 8.3–10.1)
CHLORIDE SERPL-SCNC: 94 MMOL/L (ref 100–108)
CO2 SERPL-SCNC: 34 MMOL/L (ref 21–32)
CO2 SERPL-SCNC: 35 MMOL/L (ref 21–32)
CO2 SERPL-SCNC: 37 MMOL/L (ref 21–32)
CREAT SERPL-MCNC: 0.8 MG/DL (ref 0.6–1.3)
CREAT SERPL-MCNC: 0.91 MG/DL (ref 0.6–1.3)
CREAT SERPL-MCNC: 1.06 MG/DL (ref 0.6–1.3)
EOSINOPHIL # BLD AUTO: 0.27 THOUSAND/ΜL (ref 0–0.61)
EOSINOPHIL NFR BLD AUTO: 1 % (ref 0–6)
ERYTHROCYTE [DISTWIDTH] IN BLOOD BY AUTOMATED COUNT: 13.2 % (ref 11.6–15.1)
GFR SERPL CREATININE-BSD FRML MDRD: 51 ML/MIN/1.73SQ M
GFR SERPL CREATININE-BSD FRML MDRD: 61 ML/MIN/1.73SQ M
GFR SERPL CREATININE-BSD FRML MDRD: 72 ML/MIN/1.73SQ M
GLUCOSE SERPL-MCNC: 136 MG/DL (ref 65–140)
GLUCOSE SERPL-MCNC: 148 MG/DL (ref 65–140)
GLUCOSE SERPL-MCNC: 217 MG/DL (ref 65–140)
HCT VFR BLD AUTO: 42 % (ref 34.8–46.1)
HGB BLD-MCNC: 13.3 G/DL (ref 11.5–15.4)
IMM GRANULOCYTES # BLD AUTO: 0.23 THOUSAND/UL (ref 0–0.2)
IMM GRANULOCYTES NFR BLD AUTO: 1 % (ref 0–2)
LYMPHOCYTES # BLD AUTO: 2.36 THOUSANDS/ΜL (ref 0.6–4.47)
LYMPHOCYTES NFR BLD AUTO: 12 % (ref 14–44)
MAGNESIUM SERPL-MCNC: 1.7 MG/DL (ref 1.6–2.6)
MCH RBC QN AUTO: 31.8 PG (ref 26.8–34.3)
MCHC RBC AUTO-ENTMCNC: 31.7 G/DL (ref 31.4–37.4)
MCV RBC AUTO: 101 FL (ref 82–98)
MONOCYTES # BLD AUTO: 2.45 THOUSAND/ΜL (ref 0.17–1.22)
MONOCYTES NFR BLD AUTO: 13 % (ref 4–12)
NEUTROPHILS # BLD AUTO: 13.8 THOUSANDS/ΜL (ref 1.85–7.62)
NEUTS SEG NFR BLD AUTO: 72 % (ref 43–75)
NRBC BLD AUTO-RTO: 0 /100 WBCS
PLATELET # BLD AUTO: 369 THOUSANDS/UL (ref 149–390)
PMV BLD AUTO: 12.6 FL (ref 8.9–12.7)
POTASSIUM SERPL-SCNC: 3.1 MMOL/L (ref 3.5–5.3)
POTASSIUM SERPL-SCNC: 3.1 MMOL/L (ref 3.5–5.3)
POTASSIUM SERPL-SCNC: 3.3 MMOL/L (ref 3.5–5.3)
RBC # BLD AUTO: 4.18 MILLION/UL (ref 3.81–5.12)
SODIUM SERPL-SCNC: 135 MMOL/L (ref 136–145)
WBC # BLD AUTO: 19.21 THOUSAND/UL (ref 4.31–10.16)

## 2020-07-01 PROCEDURE — 85025 COMPLETE CBC W/AUTO DIFF WBC: CPT | Performed by: INTERNAL MEDICINE

## 2020-07-01 PROCEDURE — 99233 SBSQ HOSP IP/OBS HIGH 50: CPT | Performed by: INTERNAL MEDICINE

## 2020-07-01 PROCEDURE — 83735 ASSAY OF MAGNESIUM: CPT | Performed by: INTERNAL MEDICINE

## 2020-07-01 PROCEDURE — 99232 SBSQ HOSP IP/OBS MODERATE 35: CPT | Performed by: INTERNAL MEDICINE

## 2020-07-01 PROCEDURE — 97535 SELF CARE MNGMENT TRAINING: CPT

## 2020-07-01 PROCEDURE — 80048 BASIC METABOLIC PNL TOTAL CA: CPT | Performed by: INTERNAL MEDICINE

## 2020-07-01 PROCEDURE — 97530 THERAPEUTIC ACTIVITIES: CPT

## 2020-07-01 PROCEDURE — 97116 GAIT TRAINING THERAPY: CPT

## 2020-07-01 RX ORDER — NYSTATIN 100000 [USP'U]/G
POWDER TOPICAL 2 TIMES DAILY
Status: DISCONTINUED | OUTPATIENT
Start: 2020-07-01 | End: 2020-07-03 | Stop reason: HOSPADM

## 2020-07-01 RX ORDER — LISINOPRIL 5 MG/1
5 TABLET ORAL DAILY
Status: DISCONTINUED | OUTPATIENT
Start: 2020-07-01 | End: 2020-07-01

## 2020-07-01 RX ORDER — POTASSIUM CHLORIDE 20 MEQ/1
40 TABLET, EXTENDED RELEASE ORAL ONCE
Status: DISCONTINUED | OUTPATIENT
Start: 2020-07-01 | End: 2020-07-01

## 2020-07-01 RX ORDER — POTASSIUM CHLORIDE 20 MEQ/1
20 TABLET, EXTENDED RELEASE ORAL 2 TIMES DAILY
Status: COMPLETED | OUTPATIENT
Start: 2020-07-01 | End: 2020-07-01

## 2020-07-01 RX ORDER — METRONIDAZOLE 500 MG/1
500 TABLET ORAL EVERY 8 HOURS SCHEDULED
Status: DISCONTINUED | OUTPATIENT
Start: 2020-07-01 | End: 2020-07-03 | Stop reason: HOSPADM

## 2020-07-01 RX ADMIN — LEVOTHYROXINE SODIUM 125 MCG: 125 TABLET ORAL at 06:14

## 2020-07-01 RX ADMIN — METRONIDAZOLE 500 MG: 500 TABLET ORAL at 06:14

## 2020-07-01 RX ADMIN — POTASSIUM CHLORIDE 20 MEQ: 1500 TABLET, EXTENDED RELEASE ORAL at 15:31

## 2020-07-01 RX ADMIN — METOPROLOL SUCCINATE 50 MG: 50 TABLET, EXTENDED RELEASE ORAL at 11:14

## 2020-07-01 RX ADMIN — HEPARIN SODIUM 5000 UNITS: 5000 INJECTION INTRAVENOUS; SUBCUTANEOUS at 22:30

## 2020-07-01 RX ADMIN — FUROSEMIDE 40 MG: 40 TABLET ORAL at 11:13

## 2020-07-01 RX ADMIN — POTASSIUM CHLORIDE 40 MEQ: 1500 TABLET, EXTENDED RELEASE ORAL at 11:11

## 2020-07-01 RX ADMIN — POTASSIUM CHLORIDE 20 MEQ: 1500 TABLET, EXTENDED RELEASE ORAL at 18:38

## 2020-07-01 RX ADMIN — METRONIDAZOLE 500 MG: 500 TABLET ORAL at 22:30

## 2020-07-01 RX ADMIN — HEPARIN SODIUM 5000 UNITS: 5000 INJECTION INTRAVENOUS; SUBCUTANEOUS at 15:30

## 2020-07-01 RX ADMIN — MELATONIN 6 MG: at 22:30

## 2020-07-01 RX ADMIN — CEFEPIME HYDROCHLORIDE 1000 MG: 1 INJECTION, POWDER, FOR SOLUTION INTRAMUSCULAR; INTRAVENOUS at 00:11

## 2020-07-01 RX ADMIN — HEPARIN SODIUM 5000 UNITS: 5000 INJECTION INTRAVENOUS; SUBCUTANEOUS at 06:14

## 2020-07-01 RX ADMIN — NYSTATIN: 100000 POWDER TOPICAL at 15:33

## 2020-07-01 RX ADMIN — NYSTATIN: 100000 POWDER TOPICAL at 18:38

## 2020-07-01 RX ADMIN — CEFEPIME HYDROCHLORIDE 1000 MG: 1 INJECTION, POWDER, FOR SOLUTION INTRAMUSCULAR; INTRAVENOUS at 23:49

## 2020-07-01 RX ADMIN — CLOPIDOGREL BISULFATE 75 MG: 75 TABLET ORAL at 11:12

## 2020-07-01 RX ADMIN — METRONIDAZOLE 500 MG: 500 TABLET ORAL at 15:32

## 2020-07-01 NOTE — PHYSICAL THERAPY NOTE
Physical Therapy Treatment Note     Patient Name: Marta Bettencourt    [id number] Date: 7/1/2020     Problem List  Principal Problem:    Acute hepatic encephalopathy  Active Problems:    PHILIPPE (acute kidney injury) ([facility] [address] )    Elevated troponin    Transaminitis    Elevated INR    Leukocytosis    Acute respiratory failure with hypoxia and hypercapnia (HCC)    Cardiomyopathy    Poor social situation    Localized swelling on right hand    Acetaminophen toxicity    Hypokalemia    Encephalopathy    Suspected pneumonia    Foot ulcer ([facility] [address] )    Presence of retained hardware    Elevated CK    Hypertension    Hypothyroidism       Past Medical History  Past Medical History:   Diagnosis Date    Allergies     Anxiety     Asthma     Disease of thyroid gland     High blood pressure     Hyperlipemia     Hypertension     Sleep disorder         Past Surgical History  History reviewed  No pertinent surgical history  07/01/20 1026   Pain Assessment   Pain Assessment Tool 0-10   Pain Score No Pain   Restrictions/Precautions   Weight Bearing Precautions Per Order No   Other Precautions Bed Alarm; Chair Alarm;Cognitive; Fall Risk;Telemetry   General   Chart Reviewed Yes   Family/Caregiver Present No   Cognition   Overall Cognitive Status Impaired   Arousal/Participation Responsive   Attention Attends with cues to redirect   Orientation Level Oriented to person;Disoriented to situation;Disoriented to time;Disoriented to place   Memory Decreased recall of precautions;Decreased recall of recent events;Decreased short term memory   Following Commands Follows one step commands inconsistently   Comments Flat affect - requires encourgement to participate  Somewhat limited by behavior   Subjective   Subjective "I just want ginger ale"   Bed Mobility   Supine to Sit 3  Moderate assistance   Additional items Assist x 1; Increased time required;LE management   Additional Comments Concluded session with patient seated OOB with chair alarm on   Transfers   Sit to Stand 3  Moderate assistance   Additional items Assist x 2; Increased time required;Verbal cues  (Progressed to mod A x 1)   Stand to Sit 3  Moderate assistance   Additional items Assist x 1; Increased time required;Verbal cues   Additional Comments Performed with RW - required mod A x 2 initially and progressed to mod A x 1 with repetition  Requires verbal cues for safety   Ambulation/Elevation   Gait pattern Short stride; Foward flexed; Shuffling;Decreased foot clearance; Excessively slow   Gait Assistance 4  Minimal assist   Additional items Assist x 1;Verbal cues  (2nd person for chair follow; VC for safety and sequencing)   Assistive Device Rolling walker   Distance 40 feet x 2 - one 5 minute seated rest break   Balance   Static Sitting Fair -   Dynamic Sitting Fair -   Static Standing Poor +   Dynamic Standing Poor +   Ambulatory Poor +   Activity Tolerance   Activity Tolerance Patient limited by fatigue   Medical Staff Made Aware Kennedy Gray   Nurse Made Aware Yes, per RN Kenya Schultz patient is appropriate for PT session   Assessment   Prognosis Fair   Problem List Decreased strength;Decreased endurance;Decreased mobility; Decreased coordination;Decreased cognition; Impaired judgement;Decreased safety awareness   Assessment Patient seen for physical therapy session with a focus on activity tolerance, bed mobility, sitting/standing balance, and gait training  Pt received supine in bed and agreeable to PT session  Presented with flat affect and required encouragement to participate in PT session  However, pt has made significant progress towards therapy goals in comparison to initial evaluation  Improvement noted in level of assistance for all functional mobility - currently requires A x 1 for all functional activity  Increased ambulation distance to approx 40 feet x 2 - required seated rest break due to fatigue   Increased time required for all functional mobility 2* fatigue and cognition deficits  PT to continue to follow and recommend STR upon discharge once medically stable  Goals   Patient Goals To see her dog    STG Expiration Date 07/06/20   STG #2  Additional PT goals: 1) Ambulate >75 feet with LRAD at supervision level 2) Perform full flight of steps with min A x 1 and 1 handrail  PT Treatment Day 2   Plan   Treatment/Interventions Functional transfer training;LE strengthening/ROM; Therapeutic exercise; Endurance training;Patient/family training;Bed mobility;Gait training   Progress Progressing toward goals   PT Frequency   (3-5x/wk)   Recommendation   PT Discharge Recommendation Post-Acute Rehabilitation Services   Equipment Recommended Walker   PT - OK to Discharge Yes  (to rehab once medically stable)       Yolanda Argueta PT, DPT

## 2020-07-01 NOTE — PROGRESS NOTES
Progress Note - Ronald Winchester 1945, 66 y o  female MRN: [id number]    Unit/Bed#: [facility] 724-01 Encounter: [id number]    Primary Care Provider: No primary care provider on file  Date and time admitted to hospital: 6/25/2020  3:20 PM        Presence of retained hardware  Assessment & Plan  · Has broken screw in right 2nd toe which is asymptomatic  · Was scheduled to have it removed by Dr Gianni Milner   · Note podiatry input  Suspected pneumonia  Assessment & Plan  · CT chest with bilateral centrally oriented alveolar opacities   · Opacities mainly due to volume overload however has elevated procalcitonin and leukocytosis  · Was found unresponsive - concern for aspiration  · Ct Cefepime/metronidazole  · MRSA swab negative  · Vancomycin discontinuedFollow-up blood cultures  · Monitor temperature, WBC  · Trend procalcitonin    Localized swelling on right hand  Assessment & Plan  · Doppler ordered    Poor social situation  Assessment & Plan  · Reportedly approximately more than 3 weeks ago, she was in [facility] for a total of 3 weeks likewise due to inability to care for self  According to the niece who is in the room, her house is unkept and the utilities are turned off  It seems also that there may be some psychiatric concerns that prompted her admission  She was released approximately 3 weeks ago as mentioned, stayed the first 2 days in a hotel due to the fact that her house is not functional   After that, she has stayed with her niece   However, no longer living with niece now  · Niece states they have area on aging involved to assist with social support     Leukocytosis  Assessment & Plan    Continue to trend   Workup wound as above with podiatry  Possible Ceretec scan    Transaminitis  Assessment & Plan  · Most likely as a result of chronic Tylenol toxicity as patient likes taking Tylenol p m  without telling others  She is also on some medications which are metabolized hepatically    Will discontinue those medications  Hold off on any Tylenol  · GI consult  · Toxicology consult       VTE Pharmacologic Prophylaxis:   Pharmacologic: Heparin  Mechanical VTE Prophylaxis in Place: No    Patient Centered Rounds: I have performed bedside rounds with nursing staff today  Time Spent for Care: 15 minutes  More than 50% of total time spent on counseling and coordination of care as described above  Current Length of Stay: 6 day(s)    Current Patient Status: Inpatient   Certification Statement: The patient will continue to require additional inpatient hospital stay due to Need to monitor symptoms        Code Status: Level 1 - Full Code      Subjective:   No acute distress    Objective:     Vitals:   Temp (24hrs), Av 1 °F (36 2 °C), Min:97 1 °F (36 2 °C), Max:97 1 °F (36 2 °C)    Temp:  [97 1 °F (36 2 °C)] 97 1 °F (36 2 °C)  HR:  [75-95] 95  Resp:  [14] 14  BP: (134-137)/(57-63) 137/63  SpO2:  [91 %-94 %] 94 %  Body mass index is 31 9 kg/m²  Input and Output Summary (last 24 hours): Intake/Output Summary (Last 24 hours) at 2020 0926  Last data filed at 2020 0800  Gross per 24 hour   Intake 860 ml   Output    Net 860 ml       Physical Exam:     Physical Exam   Constitutional: She is oriented to person, place, and time  HENT:   Head: Normocephalic and atraumatic  Neck: Normal range of motion  Neck supple  Cardiovascular: Normal rate and regular rhythm  No murmur heard  Pulmonary/Chest: Effort normal  No stridor  No respiratory distress  Abdominal: Soft  Bowel sounds are normal    Musculoskeletal: Normal range of motion  She exhibits no edema  Neurological: She is alert and oriented to person, place, and time  No cranial nerve deficit  Skin: Skin is warm and dry         Additional Data:     Labs:    Results from last 7 days   Lab Units 20  0449   WBC Thousand/uL 19 66*   HEMOGLOBIN g/dL 12 6   HEMATOCRIT % 40 8   PLATELETS Thousands/uL 325   NEUTROS PCT % 68   LYMPHS PCT % 14   MONOS PCT % 14*   EOS PCT % 2     Results from last 7 days   Lab Units 07/01/20  0613 06/30/20  0449   POTASSIUM mmol/L 3 1* 4 4   CHLORIDE mmol/L 94* 98*   CO2 mmol/L 37* 34*   BUN mg/dL 19 17   CREATININE mg/dL 0 80 0 82   CALCIUM mg/dL 8 6 8 9   ALK PHOS U/L  --  98   ALT U/L  --  306*   AST U/L  --  79*     Results from last 7 days   Lab Units 06/29/20  0501   INR  1 19       * I Have Reviewed All Lab Data Listed Above  * Additional Pertinent Lab Tests Reviewed: All Labs Within Last 24 Hours Reviewed      Recent Cultures (last 7 days):     Results from last 7 days   Lab Units 06/27/20  1104 06/26/20  1121 06/26/20  1112   BLOOD CULTURE   --  No Growth After 4 Days  No Growth After 4 Days  LEGIONELLA URINARY ANTIGEN  Negative  --   --        Last 24 Hours Medication List:     Current Facility-Administered Medications:  aluminum-magnesium hydroxide-simethicone 5 mL Oral Q6H PRN Rikki Hong MD    cefepime 1,000 mg Intravenous Q12H [place] MD Last Rate: 1,000 mg (07/01/20 0011)   clopidogrel 75 mg Oral Daily Higinio Simental MD    furosemide 40 mg Oral Daily YG [place]ina    heparin (porcine) 5,000 Units Subcutaneous Q8H [address] Rikki Hong MD    levothyroxine 125 mcg Oral Early Morning Rikki Hong MD    melatonin 6 mg Oral HS [place] MD    metoprolol succinate 50 mg Oral Daily Georgina Jamison MD    metroNIDAZOLE 500 mg Oral [facility] [place] MD    ondansetron 4 mg Intravenous Q6H PRN Rikki Hong MD    potassium chloride 40 mEq Oral Daily YG [place]ina         Today, Patient Was Seen By: Anel Vargas DO    ** Please Note: Dictation voice to text software may have been used in the creation of this document   **

## 2020-07-01 NOTE — ASSESSMENT & PLAN NOTE
· CT chest with bilateral centrally oriented alveolar opacities   · Opacities mainly due to volume overload however has elevated procalcitonin and leukocytosis  · Was found unresponsive - concern for aspiration  · Ct Cefepime/metronidazole  · MRSA swab negative  · Vancomycin discontinuedFollow-up blood cultures  · Monitor temperature, WBC  · Trend procalcitonin

## 2020-07-01 NOTE — SOCIAL WORK
SELINA completed and sent to [facility] via [address] [place] [address] Drive

## 2020-07-01 NOTE — ASSESSMENT & PLAN NOTE
Possibly related to pneumonia versus other etiology  Continue to treat supportively  Possible stress reaction  Continue with treatment for pneumonia    Will extend for 7 days total

## 2020-07-01 NOTE — ASSESSMENT & PLAN NOTE
· Most likely as a result of chronic Tylenol toxicity as patient likes taking Tylenol p m  without telling others  She is also on some medications which are metabolized hepatically  Will discontinue those medications  Hold off on any Tylenol  · GI consult      · Toxicology consult

## 2020-07-01 NOTE — OCCUPATIONAL THERAPY NOTE
Occupational Therapy Treatment Note     07/01/20 1025   Restrictions/Precautions   Weight Bearing Precautions Per Order No   Other Precautions Bed Alarm;Cognitive; Fall Risk;Telemetry   Lifestyle   Autonomy Pt reports being I with ADLS, receiving assistance with IADL, and ambulating without device PTA  (+)    Reciprocal Relationships Pt lives with her niece  Unclear what level of support she is able to provide  Service to Others Retired, reports she would like to get a part time job   Intrinsic Gratification Enjoys reading   Pain Assessment   Pain Assessment Tool 0-10   Pain Score No Pain   ADL   Where Assessed Edge of bed   LB Dressing Assistance 3  Moderate Assistance   LB Dressing Deficit Thread RLE into underwear; Thread LLE into underwear;Pull up over hips   Bed Mobility   Supine to Sit 3  Moderate assistance   Additional items Assist x 1   Transfers   Sit to Stand 3  Moderate assistance   Additional items Assist x 1;Assist x 2   Stand to Sit 3  Moderate assistance   Additional items Assist x 1   Functional Mobility   Functional Mobility 4  Minimal assistance   Additional items Rolling walker   Cognition   Overall Cognitive Status Impaired   Arousal/Participation Responsive;Arousable   Attention Attends with cues to redirect   Orientation Level Oriented to person;Disoriented to place; Disoriented to time;Disoriented to situation   Memory Decreased recall of precautions   Following Commands Follows one step commands inconsistently   Assessment   Assessment Pt participated in occupational therapy with focus on activity tolerance,  bed mob, unsupported sitting balance and tolerance for pt engagement in functional self-care task/LB self-care  Pt cleared by RN/Luanne for pt participation in therapy  Pt received HOB raised/supine pt sitting upright and pt Identifiers confirmed  Pt unable to report her goal 2* pt cognitive impairments    Pt required assist for bed mob, and functional transfers/mob with RW  2* pt balance deficits and decreased overall strength and activity tolerance  Pt will require post acute rehab services to continue to address these above noted pt deficits which currently impair pt ADL and functional mob   Pt chair alarm active post session all needs within reach   Plan   Treatment Interventions ADL retraining   Goal Expiration Date 07/08/20   OT Treatment Day 1   OT Frequency 3-5x/wk   Recommendation   OT Discharge Recommendation Post-Acute Rehabilitation Services   Barthel Index   Feeding 5   Bathing 0   Grooming Score 0   Dressing Score 0   Bladder Score 5   Bowels Score 0   Toilet Use Score 0   Transfers (Bed/Chair) Score 5   Mobility (Level Surface) Score 0   Stairs Score 0   Barthel Index Score 15   Modified Gentry Scale   Modified Gentry Scale 4       Nikole PUENTE/MARCIA

## 2020-07-01 NOTE — PROGRESS NOTES
Progress Note - Cardiology   Faisal Rocha 66 y o  female MRN: [id number]  Unit/Bed#: [facility] 724-01 Encounter: [id number]        Principal Problem:    Acute hepatic encephalopathy  Active Problems:    PHILIPPE (acute kidney injury) ([address] )    Elevated troponin    Transaminitis    Elevated INR    Leukocytosis    Acute respiratory failure with hypoxia and hypercapnia (HCC)    Cardiomyopathy    Poor social situation    Localized swelling on right hand    Acetaminophen toxicity    Hypokalemia    Encephalopathy    Suspected pneumonia    Foot ulcer ([facility] [address] )    Presence of retained hardware    Elevated CK    Hypertension    Hypothyroidism        Assessment/ Plan         1  Acute systolic CHF/ EF 54%  Likely nonischemic and stress induced  S/P IV diuresis  Once clinically improved likely ischemia evaluation  For now medical management  Initially diuresed with IV Lasix, now transitioned to oral Lasix 40 mg daily  GDMT- BB- changed to long acting /Ace held d/t PHILIPPE ( now resolved)  Start lisinopril 5mg if OK with renal     No longer on telemetry     2  Elevated troponin likely representing non MI troponin elevation in setting of renal insufficiency, hypoxia, and volume overload/ CMP  ECG- NSR with nonspecific ST/T wave changes  Will need ischemic evaluation in setting of #4, likely as outpatient  Pt allergic to ASA, thus plavix started      3  PHILIPPE, resolved- Cr 2 48 on admission, now 0 88  Volume overload + possible heavy NSAID use     4  Transaminitis- improving    RUQ ultrasound without acute findings  Possibly due to acute CHF/volume overload vs acetaminophen toxicity- treated with acetylcysteine  Gastroenterology eval       5  PAD  Arterial  duplex suggests arterial non occlusive disease  No further vascular workup planned     6  Toe ulceration/retained hardware- podiatry followup     7  Hypothyroidism- TSH WNL     8  HTN- normotensive   On amlodipine 5 mg daily, Lasix 40 mg daily, toprol 50mg       Subjective/Objective   Chief Complaint/Subjective  Pt without CP, SOB  Not lightheaded  Vitals: /63   Pulse 95   Temp (!) 97 1 °F (36 2 °C)   Resp 14   Ht 5' (1 524 m)   Wt 74 1 kg (163 lb 5 8 oz)   SpO2 94%   BMI 31 90 kg/m²     Vitals:    06/30/20 0600 07/01/20 0600   Weight: 73 1 kg (161 lb 2 5 oz) 74 1 kg (163 lb 5 8 oz)     Orthostatic Blood Pressures      Most Recent Value   Blood Pressure  137/63 filed at 07/01/2020 0722   Patient Position - Orthostatic VS  Standing - Orthostatic VS filed at 06/26/2020 0729            Intake/Output Summary (Last 24 hours) at 7/1/2020 0935  Last data filed at 7/1/2020 0800  Gross per 24 hour   Intake 860 ml   Output    Net 860 ml       Invasive Devices     Peripheral Intravenous Line            Peripheral IV 07/01/20 Dorsal (posterior); Right Forearm less than 1 day          Drain            External Urinary Catheter 5 days                Current Facility-Administered Medications   Medication Dose Route Frequency    aluminum-magnesium hydroxide-simethicone (MYLANTA) 200-200-20 mg/5 mL oral suspension 5 mL  5 mL Oral Q6H PRN    cefepime (MAXIPIME) 1,000 mg in dextrose 5 % 50 mL IVPB  1,000 mg Intravenous Q12H    clopidogrel (PLAVIX) tablet 75 mg  75 mg Oral Daily    furosemide (LASIX) tablet 40 mg  40 mg Oral Daily    heparin (porcine) subcutaneous injection 5,000 Units  5,000 Units Subcutaneous Q8H [facility]    levothyroxine tablet 125 mcg  125 mcg Oral Early Morning    melatonin tablet 6 mg  6 mg Oral HS    metoprolol succinate (TOPROL-XL) 24 hr tablet 50 mg  50 mg Oral Daily    metroNIDAZOLE (FLAGYL) tablet 500 mg  500 mg Oral Q8H [facility]    ondansetron (ZOFRAN) injection 4 mg  4 mg Intravenous Q6H PRN    potassium chloride (K-DUR,KLOR-CON) CR tablet 40 mEq  40 mEq Oral Daily         Physical Exam: /63   Pulse 95   Temp (!) 97 1 °F (36 2 °C)   Resp 14   Ht 5' (1 524 m)   Wt 74 1 kg (163 lb 5 8 oz)   SpO2 94%   BMI 31 90 kg/m²     General Appearance:    Alert, cooperative, no distress, appears stated age   Head:    Normocephalic, no scleral icterus   Eyes:    PERRL   Nose:   Nares normal, septum midline, no drainage    Throat:   Lips, mucosa, and tongue normal   Neck:   Supple, symmetrical, trachea midline,       no VD   Back:     Symmetric, no CVA tenderness   Lungs:     Clear to auscultation bilaterally, respirations unlabored   Chest Wall:    No tenderness or deformity    Heart:    Regular rate and rhythm, S1 and S2 normal, no murmur, rub   or gallop       Extremities:   Extremities normal, atraumatic, no cyanosis or edema   Pulses:   2+ and symmetric all extremities   Skin:   Skin color, texture, turgor normal, no rashes or lesions   Neurologic:   Alert and oriented to person place and time, no focal deficits                 Lab Results:   Recent Results (from the past 72 hour(s))   Protime-INR    Collection Time: 06/29/20  5:01 AM   Result Value Ref Range    Protime 15 1 (H) 11 6 - 14 5 seconds    INR 1 19 0 84 - 3 59   Basic metabolic panel    Collection Time: 06/29/20  5:01 AM   Result Value Ref Range    Sodium 137 136 - 145 mmol/L    Potassium 2 8 (L) 3 5 - 5 3 mmol/L    Chloride 92 (L) 100 - 108 mmol/L    CO2 39 (H) 21 - 32 mmol/L    ANION GAP 6 4 - 13 mmol/L    BUN 21 5 - 25 mg/dL    Creatinine 0 88 0 60 - 1 30 mg/dL    Glucose 146 (H) 65 - 140 mg/dL    Calcium 8 1 (L) 8 3 - 10 1 mg/dL    eGFR 64 ml/min/1 73sq m   CK (with reflex to MB)    Collection Time: 06/29/20  5:01 AM   Result Value Ref Range    Total CK 62 26 - 192 U/L   Hepatic function panel    Collection Time: 06/29/20  5:01 AM   Result Value Ref Range    Total Bilirubin 1 06 (H) 0 20 - 1 00 mg/dL    Bilirubin, Direct 0 41 (H) 0 00 - 0 20 mg/dL    Alkaline Phosphatase 102 46 - 116 U/L     (H) 5 - 45 U/L     (H) 12 - 78 U/L    Total Protein 7 2 6 4 - 8 2 g/dL    Albumin 3 0 (L) 3 5 - 5 0 g/dL   Magnesium    Collection Time: 06/29/20  5:01 AM   Result Value Ref Range    Magnesium 1 4 (L) 1 6 - 2 6 mg/dL   Procalcitonin Reflex    Collection Time: 06/29/20  5:10 AM   Result Value Ref Range    Procalcitonin 1 13 (H) <=0 25 ng/ml   Comprehensive metabolic panel    Collection Time: 06/30/20  4:49 AM   Result Value Ref Range    Sodium 138 136 - 145 mmol/L    Potassium 4 4 3 5 - 5 3 mmol/L    Chloride 98 (L) 100 - 108 mmol/L    CO2 34 (H) 21 - 32 mmol/L    ANION GAP 6 4 - 13 mmol/L    BUN 17 5 - 25 mg/dL    Creatinine 0 82 0 60 - 1 30 mg/dL    Glucose 127 65 - 140 mg/dL    Calcium 8 9 8 3 - 10 1 mg/dL    AST 79 (H) 5 - 45 U/L     (H) 12 - 78 U/L    Alkaline Phosphatase 98 46 - 116 U/L    Total Protein 7 0 6 4 - 8 2 g/dL    Albumin 2 7 (L) 3 5 - 5 0 g/dL    Total Bilirubin 1 05 (H) 0 20 - 1 00 mg/dL    eGFR 70 ml/min/1 73sq m   Magnesium    Collection Time: 06/30/20  4:49 AM   Result Value Ref Range    Magnesium 2 3 1 6 - 2 6 mg/dL   CBC and differential    Collection Time: 06/30/20  4:49 AM   Result Value Ref Range    WBC 19 66 (H) 4 31 - 10 16 Thousand/uL    RBC 3 98 3 81 - 5 12 Million/uL    Hemoglobin 12 6 11 5 - 15 4 g/dL    Hematocrit 40 8 34 8 - 46 1 %     (H) 82 - 98 fL    MCH 31 7 26 8 - 34 3 pg    MCHC 30 9 (L) 31 4 - 37 4 g/dL    RDW 13 2 11 6 - 15 1 %    MPV 12 6 8 9 - 12 7 fL    Platelets 624 667 - 816 Thousands/uL    nRBC 0 /100 WBCs    Neutrophils Relative 68 43 - 75 %    Immat GRANS % 1 0 - 2 %    Lymphocytes Relative 14 14 - 44 %    Monocytes Relative 14 (H) 4 - 12 %    Eosinophils Relative 2 0 - 6 %    Basophils Relative 1 0 - 1 %    Neutrophils Absolute 13 41 (H) 1 85 - 7 62 Thousands/µL    Immature Grans Absolute 0 21 (H) 0 00 - 0 20 Thousand/uL    Lymphocytes Absolute 2 69 0 60 - 4 47 Thousands/µL    Monocytes Absolute 2 78 (H) 0 17 - 1 22 Thousand/µL    Eosinophils Absolute 0 48 0 00 - 0 61 Thousand/µL    Basophils Absolute 0 09 0 00 - 0 10 Thousands/µL   Basic metabolic panel    Collection Time: 07/01/20  6:13 AM   Result Value Ref Range    Sodium 135 (L) 136 - 145 mmol/L    Potassium 3 1 (L) 3 5 - 5 3 mmol/L    Chloride 94 (L) 100 - 108 mmol/L    CO2 37 (H) 21 - 32 mmol/L    ANION GAP 4 4 - 13 mmol/L    BUN 19 5 - 25 mg/dL    Creatinine 0 80 0 60 - 1 30 mg/dL    Glucose 148 (H) 65 - 140 mg/dL    Calcium 8 6 8 3 - 10 1 mg/dL    eGFR 72 ml/min/1 73sq m   Magnesium    Collection Time: 07/01/20  6:13 AM   Result Value Ref Range    Magnesium 1 7 1 6 - 2 6 mg/dL     Imaging: I have personally reviewed pertinent reports  Counseling / Coordination of Care  Total time spent today 20  minutes  Greater than 50% of total time was spent with the patient and / or family counseling and / or coordination of care

## 2020-07-01 NOTE — PROGRESS NOTES
NEPHROLOGY PROGRESS NOTE   Ronald Winchester 66 y o  female MRN: [id number]  Unit/Bed#: [facility] 724-01 Encounter: [id number]    ASSESSMENT & PLAN:  58-year-old female with history of hypertension was found down and confused and brought to the hospital   Nephrology consulted for acute kidney injury  Admission creatinine 2 48  Acute kidney injury, POA  -admission creatinine 2 48, baseline not known    -Suspected to be multifactorial - component of prerenal azotemia  + possible heavy NSAID use + elevated CK -1194 on 06/25 and then trended down  UA is not consistent with a GN or AIN but does have moderate blood with only 2-4 RBCs  No hydronephrosis on CT imaging    -renal function continue to improve, creatinine down to 0 8 on 7/1 am but repeat labs from today afternoon showed cr 1 06   -continue current dose of diuretics, Lasix 40 mg daily  Dose was decreased on 06/28  Clinically appears euvolemic  -avoid nephrotoxins, hypotension  Monitor renal function    Acute systolic CHF:  Ejection fraction 35%/new onset cardiomyopathy, cardiology on board  - Volume status improved  Continue Lasix 40 mg daily  Dose was decreased on 06/28    Hypokalemia- replaced and improving with K 3 3 on last check  Is planned for another 20 meq kcl later today  Hypo magnesemia:  Resolved with replacement, will recheck in am in setting of hypokalemia    Primary hypertension:    -Blood pressure acceptable, continue current medication-  metoprolol and Lasix  Off amlodipine  Okay to use low dose ACEI if BP allows  Elevated CK/rhabdomyolysis:  CK 1194 on admission and trended down to 281  Likely due to being down for extended period of time  Encephalopathy-improving  Suspected to have acute hepatic encephalopathy from chronic Tylenol toxicity, status post NAC  LFTs improving  Acute respiratory failure with hypoxia and hypercapnia-due to fluid overload  Improving  Elevated troponin:  Cardiology following    New onset cardiomyopathy with ejection fraction 35%, will require ischemic evaluation as outpatient  Suspected pneumonia -on antibiotics  Nonhealing right foot ulcer:  Seen by vascular surgery no plan for further workup or intervention    Discussed with primary team- Dr Guzman Delgado:  No new complaints  No Chest pain and no SOB  OBJECTIVE:  Current Weight: Weight - Scale: 74 1 kg (163 lb 5 8 oz)  Vitals:    07/01/20 1108   BP: 129/61   Pulse: 91   Resp:    Temp:    SpO2: 97%       Intake/Output Summary (Last 24 hours) at 7/1/2020 1432  Last data filed at 7/1/2020 0800  Gross per 24 hour   Intake 640 ml   Output    Net 640 ml     Weight (last 2 days)     Date/Time   Weight    07/01/20 0600   74 1 (163 36)    06/30/20 0600   73 1 (161 16)    06/29/20 0600   74 (163 14)                Physical Exam  General:  Ill looking, awake  Eyes: Conjunctivae pink,  Sclera anicteric  ENT: lips and mucous membranes moist  Neck: supple   Chest: Clear to Auscultation both lungs,  no crackles, ronchus or wheezing  CVS: S1 & S2 present, normal rate, regular rhythm, no murmur    Abdomen: soft, non-tender, non-distended, Bowel sounds normoactive  Extremities: no edema of  legs  Skin: no rash  Neuro: awake, alert, oriented       Medications:    Current Facility-Administered Medications:     aluminum-magnesium hydroxide-simethicone (MYLANTA) 200-200-20 mg/5 mL oral suspension 5 mL, 5 mL, Oral, Q6H PRN, Cameron Ventura MD    cefepime (MAXIPIME) 1,000 mg in dextrose 5 % 50 mL IVPB, 1,000 mg, Intravenous, Q12H, Aubree Kitchen MD, Last Rate: 100 mL/hr at 07/01/20 1112, 1,000 mg at 07/01/20 1112    clopidogrel (PLAVIX) tablet 75 mg, 75 mg, Oral, Daily, Severo Crooked, MD, 75 mg at 07/01/20 1112    furosemide (LASIX) tablet 40 mg, 40 mg, Oral, Daily, YG Griffin, 40 mg at 07/01/20 1113    heparin (porcine) subcutaneous injection 5,000 Units, 5,000 Units, Subcutaneous, Q8H [address], 5,000 Units at 07/01/20 0614 **AND** [COMPLETED] Platelet count, , , Once, Cecil Marcos MD    levothyroxine tablet 125 mcg, 125 mcg, Oral, Early Morning, Cecil Marcos MD, 125 mcg at 07/01/20 7890    melatonin tablet 6 mg, 6 mg, Oral, HS, Carlos Paula MD, 6 mg at 06/30/20 2220    metoprolol succinate (TOPROL-XL) 24 hr tablet 50 mg, 50 mg, Oral, Daily, Naeem Nagel MD, 50 mg at 07/01/20 1114    metroNIDAZOLE (FLAGYL) tablet 500 mg, 500 mg, Oral, Q8H [facility], Carlos Paula MD, 500 mg at 07/01/20 3735    nystatin (MYCOSTATIN) powder, , Topical, BID, Ej Cardona DO    ondansetron (ZOFRAN) injection 4 mg, 4 mg, Intravenous, Q6H PRN, Cecil Marcos MD    potassium chloride (K-DUR,KLOR-CON) CR tablet 20 mEq, 20 mEq, Oral, BID, YG Posadas    potassium chloride (K-DUR,KLOR-CON) CR tablet 40 mEq, 40 mEq, Oral, Daily, YG Almodovar, 40 mEq at 07/01/20 1111    Invasive Devices:        Lab Results:   Results from last 7 days   Lab Units 07/01/20  1347 07/01/20  1111 07/01/20  0613 06/30/20  0449 06/29/20  0501 06/28/20  0500  06/27/20  0503  06/26/20  0528   WBC Thousand/uL  --  19 21*  --  19 66*  --  14 87*  --  16 86*  --  23 63*   HEMOGLOBIN g/dL  --  13 3  --  12 6  --  11 9  --  11 9  --  11 5   HEMATOCRIT %  --  42 0  --  40 8  --  37 9  --  36 8  --  35 8   PLATELETS Thousands/uL  --  369  --  325  --  287  --  268  --  257   POTASSIUM mmol/L 3 3* 3 1* 3 1* 4 4 2 8* 3 0*   < > 2 4*   < > 2 8*   CHLORIDE mmol/L 94* 94* 94* 98* 92* 97*   < > 102   < > 104   CO2 mmol/L 35* 34* 37* 34* 39* 35*   < > 36*   < > 25   BUN mg/dL 22 20 19 17 21 34*   < > 47*   < > 52*   CREATININE mg/dL 1 06 0 91 0 80 0 82 0 88 1 13   < > 1 40*   < > 1 91*   CALCIUM mg/dL 9 0 9 2 8 6 8 9 8 1* 8 1*   < > 8 2*   < > 7 8*   MAGNESIUM mg/dL  --   --  1 7 2 3 1 4*  --   --  1 7  --  2 1   PHOSPHORUS mg/dL  --   --   --   --   --   --   --  2 2*  --  2 9   ALK PHOS U/L  --   --   --  98 102 95  --  93   < > 89   ALT U/L  --   --   --  306* 494* 794*  --  1,096*   < > 1,473*   AST U/L  --   --   --  79* 132* 321*  --  604*   < > 1,293*    < > = values in this interval not displayed  Previous work up:         Portions of the record may have been created with voice recognition software  Occasional wrong word or "sound a like" substitutions may have occurred due to the inherent limitations of voice recognition software  Read the chart carefully and recognize, using context, where substitutions have occurred  If you have any questions, please contact the dictating provider

## 2020-07-01 NOTE — ASSESSMENT & PLAN NOTE
· Has broken screw in right 2nd toe which is asymptomatic  · Was scheduled to have it removed by Dr Delbert Garcia   · Note podiatry input

## 2020-07-01 NOTE — ASSESSMENT & PLAN NOTE
· Ulceration which probes to bone noted over the dorsal aspect of the right 3rd digit  · Lower extremity doppler - 50 - 75% stenosis in the tibial peroneal trunk bilaterally with evidence of tibio-peroneal disease  Diffuse disease without evidence of hemodyndamically significant stenosis in the femoral to popliteal arteries  · Completed bone scan - f/u results  · Vascular input    No intervention indicated

## 2020-07-01 NOTE — PLAN OF CARE
Problem: PHYSICAL THERAPY ADULT  Goal: Performs mobility at highest level of function for planned discharge setting  See evaluation for individualized goals  Description  Treatment/Interventions: Functional transfer training, LE strengthening/ROM, Therapeutic exercise, Endurance training, Cognitive reorientation, Patient/family training, Equipment eval/education, Bed mobility, Gait training, Spoke to nursing  Equipment Recommended: Erendira Kaye       See flowsheet documentation for full assessment, interventions and recommendations  Outcome: Progressing  Note:   Prognosis: Fair  Problem List: Decreased strength, Decreased endurance, Decreased mobility, Decreased coordination, Decreased cognition, Impaired judgement, Decreased safety awareness  Assessment: Patient seen for physical therapy session with a focus on activity tolerance, bed mobility, sitting/standing balance, and gait training  Pt received supine in bed and agreeable to PT session  Presented with flat affect and required encouragement to participate in PT session  However, pt has made significant progress towards therapy goals in comparison to initial evaluation  Improvement noted in level of assistance for all functional mobility - currently requires A x 1 for all functional activity  Increased ambulation distance to approx 40 feet x 2 - required seated rest break due to fatigue  Increased time required for all functional mobility 2* fatigue and cognition deficits  PT to continue to follow and recommend STR upon discharge once medically stable  PT Discharge Recommendation: Post-Acute Rehabilitation Services     PT - OK to Discharge: Yes(to rehab once medically stable)    See flowsheet documentation for full assessment

## 2020-07-01 NOTE — RESTORATIVE TECHNICIAN NOTE
Restorative Specialist Mobility Note       Activity: Ambulate in tuttle, Ambulate in room, Bathroom privileges, Chair, Dangle, Stand at bedside(Educated/encouraged pt to ambulate with assistance 3-4 x's/day  Chair alarm on  Pt callbell, phone/tray within reach )     Assistive Device: Front wheel walker, Other (Comment)(Assist x1 with chair follow   Assisted PT Rocio Humphreys )       Guillermo KING, Restorative Technician, [employer]

## 2020-07-01 NOTE — ASSESSMENT & PLAN NOTE
· Reportedly approximately more than 3 weeks ago, she was in [facility] for a total of 3 weeks likewise due to inability to care for self  According to the niece who is in the room, her house is unkept and the utilities are turned off  It seems also that there may be some psychiatric concerns that prompted her admission  She was released approximately 3 weeks ago as mentioned, stayed the first 2 days in a hotel due to the fact that her house is not functional   After that, she has stayed with her niece   However, no longer living with niece now    · Niece states they have area on aging involved to assist with social support

## 2020-07-01 NOTE — PLAN OF CARE
Problem: OCCUPATIONAL THERAPY ADULT  Goal: Performs self-care activities at highest level of function for planned discharge setting  See evaluation for individualized goals  Description  Treatment Interventions: ADL retraining, Functional transfer training, Endurance training, Cognitive reorientation, Patient/family training, Equipment evaluation/education, Compensatory technique education, Continued evaluation, Energy conservation, Activityengagement          See flowsheet documentation for full assessment, interventions and recommendations  Outcome: Progressing  Note:   Limitation: Decreased ADL status, Decreased cognition, Decreased endurance, Decreased self-care trans, Decreased high-level ADLs  Prognosis: Fair  Assessment: Pt participated in occupational therapy with focus on activity tolerance,  bed mob, unsupported sitting balance and tolerance for pt engagement in functional self-care task/LB self-care  Pt cleared by RN/Luanne for pt participation in therapy  Pt received HOB raised/supine pt sitting upright and pt Identifiers confirmed  Pt unable to report her goal 2* pt cognitive impairments  Pt required assist for bed mob, and functional transfers/mob with RW  2* pt balance deficits and decreased overall strength and activity tolerance  Pt will require post acute rehab services to continue to address these above noted pt deficits which currently impair pt ADL and functional mob   Pt chair alarm active post session all needs within reach     OT Discharge Recommendation: [address]  OT - OK to Discharge: Yes(When medically appropriate)

## 2020-07-02 LAB
ANION GAP SERPL CALCULATED.3IONS-SCNC: 4 MMOL/L (ref 4–13)
BASOPHILS # BLD AUTO: 0.11 THOUSANDS/ΜL (ref 0–0.1)
BASOPHILS NFR BLD AUTO: 1 % (ref 0–1)
BUN SERPL-MCNC: 23 MG/DL (ref 5–25)
CALCIUM SERPL-MCNC: 8.7 MG/DL (ref 8.3–10.1)
CHLORIDE SERPL-SCNC: 96 MMOL/L (ref 100–108)
CO2 SERPL-SCNC: 35 MMOL/L (ref 21–32)
CREAT SERPL-MCNC: 0.89 MG/DL (ref 0.6–1.3)
EOSINOPHIL # BLD AUTO: 0.48 THOUSAND/ΜL (ref 0–0.61)
EOSINOPHIL NFR BLD AUTO: 3 % (ref 0–6)
ERYTHROCYTE [DISTWIDTH] IN BLOOD BY AUTOMATED COUNT: 13.3 % (ref 11.6–15.1)
GFR SERPL CREATININE-BSD FRML MDRD: 63 ML/MIN/1.73SQ M
GLUCOSE SERPL-MCNC: 134 MG/DL (ref 65–140)
GLUCOSE SERPL-MCNC: 156 MG/DL (ref 65–140)
HCT VFR BLD AUTO: 39 % (ref 34.8–46.1)
HGB BLD-MCNC: 12.2 G/DL (ref 11.5–15.4)
IMM GRANULOCYTES # BLD AUTO: 0.33 THOUSAND/UL (ref 0–0.2)
IMM GRANULOCYTES NFR BLD AUTO: 2 % (ref 0–2)
LYMPHOCYTES # BLD AUTO: 2.88 THOUSANDS/ΜL (ref 0.6–4.47)
LYMPHOCYTES NFR BLD AUTO: 18 % (ref 14–44)
MAGNESIUM SERPL-MCNC: 1.7 MG/DL (ref 1.6–2.6)
MCH RBC QN AUTO: 31.7 PG (ref 26.8–34.3)
MCHC RBC AUTO-ENTMCNC: 31.3 G/DL (ref 31.4–37.4)
MCV RBC AUTO: 101 FL (ref 82–98)
MONOCYTES # BLD AUTO: 2.19 THOUSAND/ΜL (ref 0.17–1.22)
MONOCYTES NFR BLD AUTO: 13 % (ref 4–12)
NEUTROPHILS # BLD AUTO: 10.32 THOUSANDS/ΜL (ref 1.85–7.62)
NEUTS SEG NFR BLD AUTO: 63 % (ref 43–75)
NRBC BLD AUTO-RTO: 0 /100 WBCS
PLATELET # BLD AUTO: 342 THOUSANDS/UL (ref 149–390)
PMV BLD AUTO: 12.8 FL (ref 8.9–12.7)
POTASSIUM SERPL-SCNC: 4 MMOL/L (ref 3.5–5.3)
RBC # BLD AUTO: 3.85 MILLION/UL (ref 3.81–5.12)
SARS-COV-2 RNA RESP QL NAA+PROBE: NEGATIVE
SODIUM SERPL-SCNC: 135 MMOL/L (ref 136–145)
WBC # BLD AUTO: 16.31 THOUSAND/UL (ref 4.31–10.16)

## 2020-07-02 PROCEDURE — 99232 SBSQ HOSP IP/OBS MODERATE 35: CPT | Performed by: INTERNAL MEDICINE

## 2020-07-02 PROCEDURE — U0003 INFECTIOUS AGENT DETECTION BY NUCLEIC ACID (DNA OR RNA); SEVERE ACUTE RESPIRATORY SYNDROME CORONAVIRUS 2 (SARS-COV-2) (CORONAVIRUS DISEASE [COVID-19]), AMPLIFIED PROBE TECHNIQUE, MAKING USE OF HIGH THROUGHPUT TECHNOLOGIES AS DESCRIBED BY CMS-2020-01-R: HCPCS | Performed by: INTERNAL MEDICINE

## 2020-07-02 PROCEDURE — 85025 COMPLETE CBC W/AUTO DIFF WBC: CPT | Performed by: INTERNAL MEDICINE

## 2020-07-02 PROCEDURE — 99232 SBSQ HOSP IP/OBS MODERATE 35: CPT | Performed by: NURSE PRACTITIONER

## 2020-07-02 PROCEDURE — 97535 SELF CARE MNGMENT TRAINING: CPT

## 2020-07-02 PROCEDURE — 80048 BASIC METABOLIC PNL TOTAL CA: CPT | Performed by: INTERNAL MEDICINE

## 2020-07-02 PROCEDURE — 82948 REAGENT STRIP/BLOOD GLUCOSE: CPT

## 2020-07-02 PROCEDURE — 83735 ASSAY OF MAGNESIUM: CPT | Performed by: INTERNAL MEDICINE

## 2020-07-02 RX ORDER — LISINOPRIL 5 MG/1
5 TABLET ORAL DAILY
Status: DISCONTINUED | OUTPATIENT
Start: 2020-07-03 | End: 2020-07-03 | Stop reason: HOSPADM

## 2020-07-02 RX ADMIN — HEPARIN SODIUM 5000 UNITS: 5000 INJECTION INTRAVENOUS; SUBCUTANEOUS at 05:18

## 2020-07-02 RX ADMIN — POTASSIUM CHLORIDE 40 MEQ: 1500 TABLET, EXTENDED RELEASE ORAL at 09:25

## 2020-07-02 RX ADMIN — HEPARIN SODIUM 5000 UNITS: 5000 INJECTION INTRAVENOUS; SUBCUTANEOUS at 14:52

## 2020-07-02 RX ADMIN — METOPROLOL SUCCINATE 50 MG: 50 TABLET, EXTENDED RELEASE ORAL at 09:28

## 2020-07-02 RX ADMIN — HEPARIN SODIUM 5000 UNITS: 5000 INJECTION INTRAVENOUS; SUBCUTANEOUS at 21:29

## 2020-07-02 RX ADMIN — CEFEPIME HYDROCHLORIDE 1000 MG: 1 INJECTION, POWDER, FOR SOLUTION INTRAMUSCULAR; INTRAVENOUS at 23:24

## 2020-07-02 RX ADMIN — METRONIDAZOLE 500 MG: 500 TABLET ORAL at 05:18

## 2020-07-02 RX ADMIN — METRONIDAZOLE 500 MG: 500 TABLET ORAL at 14:52

## 2020-07-02 RX ADMIN — CLOPIDOGREL BISULFATE 75 MG: 75 TABLET ORAL at 09:24

## 2020-07-02 RX ADMIN — METRONIDAZOLE 500 MG: 500 TABLET ORAL at 21:29

## 2020-07-02 RX ADMIN — FUROSEMIDE 40 MG: 40 TABLET ORAL at 09:25

## 2020-07-02 RX ADMIN — LEVOTHYROXINE SODIUM 125 MCG: 125 TABLET ORAL at 05:18

## 2020-07-02 RX ADMIN — NYSTATIN: 100000 POWDER TOPICAL at 09:26

## 2020-07-02 RX ADMIN — NYSTATIN 1 APPLICATION: 100000 POWDER TOPICAL at 17:19

## 2020-07-02 RX ADMIN — CEFEPIME HYDROCHLORIDE 1000 MG: 1 INJECTION, POWDER, FOR SOLUTION INTRAMUSCULAR; INTRAVENOUS at 12:10

## 2020-07-02 RX ADMIN — MELATONIN 6 MG: at 21:29

## 2020-07-02 NOTE — PLAN OF CARE
Problem: Potential for Falls  Goal: Patient will remain free of falls  Description  INTERVENTIONS:  - Assess patient frequently for physical needs  -  Identify cognitive and physical deficits and behaviors that affect risk of falls    -  [place] fall precautions as indicated by assessment   - Educate patient/family on patient safety including physical limitations  - Instruct patient to call for assistance with activity based on assessment  - Modify environment to reduce risk of injury  - Consider OT/PT consult to assist with strengthening/mobility  Outcome: Progressing     Problem: Prexisting or High Potential for Compromised Skin Integrity  Goal: Skin integrity is maintained or improved  Description  INTERVENTIONS:  - Identify patients at risk for skin breakdown  - Assess and monitor skin integrity  - Assess and monitor nutrition and hydration status  - Monitor labs   - Assess for incontinence   - Turn and reposition patient  - Assist with mobility/ambulation  - Relieve pressure over bony prominences  - Avoid friction and shearing  - Provide appropriate hygiene as needed including keeping skin clean and dry  - Evaluate need for skin moisturizer/barrier cream  - Collaborate with interdisciplinary team   - Patient/family teaching  - Consider wound care consult   Outcome: Progressing     Problem: SAFETY ADULT  Goal: Maintain or return to baseline ADL function  Description  INTERVENTIONS:  -  Assess patient's ability to carry out ADLs; assess patient's baseline for ADL function and identify physical deficits which impact ability to perform ADLs (bathing, care of mouth/teeth, toileting, grooming, dressing, etc )  - Assess/evaluate cause of self-care deficits   - Assess range of motion  - Assess patient's mobility; develop plan if impaired  - Assess patient's need for assistive devices and provide as appropriate  - Encourage maximum independence but intervene and supervise when necessary  - Involve family in performance of ADLs  - Assess for home care needs following discharge   - Consider OT consult to assist with ADL evaluation and planning for discharge  - Provide patient education as appropriate  Outcome: Progressing  Goal: Maintain or return mobility status to optimal level  Description  INTERVENTIONS:  - Assess patient's baseline mobility status (ambulation, transfers, stairs, etc )    - Identify cognitive and physical deficits and behaviors that affect mobility  - Identify mobility aids required to assist with transfers and/or ambulation (gait belt, sit-to-stand, lift, walker, cane, etc )  - [place] fall precautions as indicated by assessment  - Record patient progress and toleration of activity level on Mobility SBAR; progress patient to next Phase/Stage  - Instruct patient to call for assistance with activity based on assessment  - Consider rehabilitation consult to assist with strengthening/weightbearing, etc   Outcome: Progressing     Problem: DISCHARGE PLANNING  Goal: Discharge to home or other facility with appropriate resources  Description  INTERVENTIONS:  - Identify barriers to discharge w/patient and caregiver  - Arrange for needed discharge resources and transportation as appropriate  - Identify discharge learning needs (meds, wound care, etc )  - Refer to Case Management Department for coordinating discharge planning if the patient needs post-hospital services based on physician/advanced practitioner order or complex needs related to functional status, cognitive ability, or social support system   Outcome: Progressing     Problem: CARDIOVASCULAR - ADULT  Goal: Maintains optimal cardiac output and hemodynamic stability  Description  INTERVENTIONS:  - Monitor I/O, vital signs and rhythm  - Monitor for S/S and trends of decreased cardiac output  - Administer and titrate ordered vasoactive medications to optimize hemodynamic stability  - Assess quality of pulses, skin color and temperature  - Assess for signs of decreased coronary artery perfusion  - Instruct patient to report change in severity of symptoms  Outcome: Progressing     Problem: METABOLIC, FLUID AND ELECTROLYTES - ADULT  Goal: Electrolytes maintained within normal limits  Description  INTERVENTIONS:  - Monitor labs and assess patient for signs and symptoms of electrolyte imbalances  - Administer electrolyte replacement as ordered  - Monitor response to electrolyte replacements, including repeat lab results as appropriate  - Instruct patient on fluid and nutrition as appropriate  Outcome: Progressing  Goal: Fluid balance maintained  Description  INTERVENTIONS:  - Monitor labs   - Monitor I/O and WT  - Instruct patient on fluid and nutrition as appropriate  - Assess for signs & symptoms of volume excess or deficit  Outcome: Progressing     Problem: SKIN/TISSUE INTEGRITY - ADULT  Goal: Skin integrity remains intact  Description  INTERVENTIONS  - Identify patients at risk for skin breakdown  - Assess and monitor skin integrity  - Assess and monitor nutrition and hydration status  - Monitor labs (i e  albumin)  - Assess for incontinence   - Turn and reposition patient  - Assist with mobility/ambulation  - Relieve pressure over bony prominences  - Avoid friction and shearing  - Provide appropriate hygiene as needed including keeping skin clean and dry  - Evaluate need for skin moisturizer/barrier cream  - Collaborate with interdisciplinary team (i e  Nutrition, Rehabilitation, etc )   - Patient/family teaching  Outcome: Progressing     Problem: MUSCULOSKELETAL - ADULT  Goal: Maintain or return mobility to safest level of function  Description  INTERVENTIONS:  - Assess patient's ability to carry out ADLs; assess patient's baseline for ADL function and identify physical deficits which impact ability to perform ADLs (bathing, care of mouth/teeth, toileting, grooming, dressing, etc )  - Assess/evaluate cause of self-care deficits   - Assess range of motion  - Assess patient's mobility  - Assess patient's need for assistive devices and provide as appropriate  - Encourage maximum independence but intervene and supervise when necessary  - Involve family in performance of ADLs  - Assess for home care needs following discharge   - Consider OT consult to assist with ADL evaluation and planning for discharge  - Provide patient education as appropriate  Outcome: Progressing  Goal: Maintain proper alignment of affected body part  Description  INTERVENTIONS:  - Support, maintain and protect limb and body alignment  - Provide patient/ family with appropriate education  Outcome: Progressing     Problem: NEUROSENSORY - ADULT  Goal: Achieves stable or improved neurological status  Description  INTERVENTIONS  - Monitor and report changes in neurological status  - Monitor vital signs such as temperature, blood pressure, glucose, and any other labs ordered   - Initiate measures to prevent increased intracranial pressure  - Monitor for seizure activity and implement precautions if appropriate      Outcome: Progressing

## 2020-07-02 NOTE — PLAN OF CARE
Problem: OCCUPATIONAL THERAPY ADULT  Goal: Performs self-care activities at highest level of function for planned discharge setting  See evaluation for individualized goals  Description  Treatment Interventions: ADL retraining, Functional transfer training, Endurance training, Cognitive reorientation, Patient/family training, Equipment evaluation/education, Compensatory technique education, Continued evaluation, Energy conservation, Activityengagement          See flowsheet documentation for full assessment, interventions and recommendations  Outcome: Progressing  Note:   Limitation: Decreased ADL status, Decreased cognition, Decreased endurance, Decreased self-care trans, Decreased high-level ADLs  Prognosis: Fair  Assessment: Pt participated in occupational therapy with focus on activity tolerance,  bed mob, functional transfers/mob, toilet transfers/toileting  Pt cleared by RN/Krissy for pt participation in occupational therapy  Pt received HOB raised/supine pt sitting upright and agreeable to therapy following pt Identifiers confirmed  Pt reported back pain and that she "wanted to be comfortable"  She required assist for bed mob, functional transfers/mob with RW to/from pt bathroom and for toilet transfers 2* pt decreased overall strength, coordination and balance  Pt will require post acute rehab services to continue to address these above noted pt deficits which currently impair pt ADL and functional mob  Pt chair alarm active post session all needs within reach       OT Discharge Recommendation: Post-Acute Rehabilitation Services  OT - OK to Discharge: Yes(When medically appropriate)

## 2020-07-02 NOTE — PLAN OF CARE
Problem: Potential for Falls  Goal: Patient will remain free of falls  Description  INTERVENTIONS:  - Assess patient frequently for physical needs  -  Identify cognitive and physical deficits and behaviors that affect risk of falls    -  [place] fall precautions as indicated by assessment   - Educate patient/family on patient safety including physical limitations  - Instruct patient to call for assistance with activity based on assessment  - Modify environment to reduce risk of injury  - Consider OT/PT consult to assist with strengthening/mobility  7/2/2020 1110 by Maria Luz Dobbs RN  Outcome: Progressing  7/2/2020 1110 by Maria Luz Dobbs RN  Outcome: Progressing     Problem: Prexisting or High Potential for Compromised Skin Integrity  Goal: Skin integrity is maintained or improved  Description  INTERVENTIONS:  - Identify patients at risk for skin breakdown  - Assess and monitor skin integrity  - Assess and monitor nutrition and hydration status  - Monitor labs   - Assess for incontinence   - Turn and reposition patient  - Assist with mobility/ambulation  - Relieve pressure over bony prominences  - Avoid friction and shearing  - Provide appropriate hygiene as needed including keeping skin clean and dry  - Evaluate need for skin moisturizer/barrier cream  - Collaborate with interdisciplinary team   - Patient/family teaching  - Consider wound care consult   7/2/2020 1110 by Maria Luz Dobbs RN  Outcome: Progressing  7/2/2020 1110 by Maria Luz Dobbs RN  Outcome: Progressing     Problem: SAFETY ADULT  Goal: Maintain or return to baseline ADL function  Description  INTERVENTIONS:  -  Assess patient's ability to carry out ADLs; assess patient's baseline for ADL function and identify physical deficits which impact ability to perform ADLs (bathing, care of mouth/teeth, toileting, grooming, dressing, etc )  - Assess/evaluate cause of self-care deficits   - Assess range of motion  - Assess patient's mobility; develop plan if impaired  - Assess patient's need for assistive devices and provide as appropriate  - Encourage maximum independence but intervene and supervise when necessary  - Involve family in performance of ADLs  - Assess for home care needs following discharge   - Consider OT consult to assist with ADL evaluation and planning for discharge  - Provide patient education as appropriate  7/2/2020 1110 by Tasia Thomas RN  Outcome: Progressing  7/2/2020 1110 by Tasia Thomas RN  Outcome: Progressing  Goal: Maintain or return mobility status to optimal level  Description  INTERVENTIONS:  - Assess patient's baseline mobility status (ambulation, transfers, stairs, etc )    - Identify cognitive and physical deficits and behaviors that affect mobility  - Identify mobility aids required to assist with transfers and/or ambulation (gait belt, sit-to-stand, lift, walker, cane, etc )  - [place] fall precautions as indicated by assessment  - Record patient progress and toleration of activity level on Mobility SBAR; progress patient to next Phase/Stage  - Instruct patient to call for assistance with activity based on assessment  - Consider rehabilitation consult to assist with strengthening/weightbearing, etc   7/2/2020 1110 by Tasia Thomas RN  Outcome: Progressing  7/2/2020 1110 by Tasia Thomas RN  Outcome: Progressing     Problem: DISCHARGE PLANNING  Goal: Discharge to home or other facility with appropriate resources  Description  INTERVENTIONS:  - Identify barriers to discharge w/patient and caregiver  - Arrange for needed discharge resources and transportation as appropriate  - Identify discharge learning needs (meds, wound care, etc )  - Refer to Case Management Department for coordinating discharge planning if the patient needs post-hospital services based on physician/advanced practitioner order or complex needs related to functional status, cognitive ability, or social support system   7/2/2020 1110 by Pat Mckenna RN  Outcome: Progressing  7/2/2020 1110 by Pat Mckenna RN  Outcome: Progressing     Problem: CARDIOVASCULAR - ADULT  Goal: Maintains optimal cardiac output and hemodynamic stability  Description  INTERVENTIONS:  - Monitor I/O, vital signs and rhythm  - Monitor for S/S and trends of decreased cardiac output  - Administer and titrate ordered vasoactive medications to optimize hemodynamic stability  - Assess quality of pulses, skin color and temperature  - Assess for signs of decreased coronary artery perfusion  - Instruct patient to report change in severity of symptoms  7/2/2020 1110 by Pat Mckenna RN  Outcome: Progressing  7/2/2020 1110 by Pat Mckenna RN  Outcome: Progressing     Problem: METABOLIC, FLUID AND ELECTROLYTES - ADULT  Goal: Electrolytes maintained within normal limits  Description  INTERVENTIONS:  - Monitor labs and assess patient for signs and symptoms of electrolyte imbalances  - Administer electrolyte replacement as ordered  - Monitor response to electrolyte replacements, including repeat lab results as appropriate  - Instruct patient on fluid and nutrition as appropriate  7/2/2020 1110 by Pat Mckenna RN  Outcome: Progressing  7/2/2020 1110 by Pat Mckenna RN  Outcome: Progressing  Goal: Fluid balance maintained  Description  INTERVENTIONS:  - Monitor labs   - Monitor I/O and WT  - Instruct patient on fluid and nutrition as appropriate  - Assess for signs & symptoms of volume excess or deficit  7/2/2020 1110 by Pat Mckenna RN  Outcome: Progressing  7/2/2020 1110 by Pat Mckenna RN  Outcome: Progressing     Problem: SKIN/TISSUE INTEGRITY - ADULT  Goal: Skin integrity remains intact  Description  INTERVENTIONS  - Identify patients at risk for skin breakdown  - Assess and monitor skin integrity  - Assess and monitor nutrition and hydration status  - Monitor labs (i e  albumin)  - Assess for incontinence - Turn and reposition patient  - Assist with mobility/ambulation  - Relieve pressure over bony prominences  - Avoid friction and shearing  - Provide appropriate hygiene as needed including keeping skin clean and dry  - Evaluate need for skin moisturizer/barrier cream  - Collaborate with interdisciplinary team (i e  Nutrition, Rehabilitation, etc )   - Patient/family teaching  7/2/2020 1110 by Johanne Escobar RN  Outcome: Progressing  7/2/2020 1110 by Johanne Escobar RN  Outcome: Progressing     Problem: MUSCULOSKELETAL - ADULT  Goal: Maintain or return mobility to safest level of function  Description  INTERVENTIONS:  - Assess patient's ability to carry out ADLs; assess patient's baseline for ADL function and identify physical deficits which impact ability to perform ADLs (bathing, care of mouth/teeth, toileting, grooming, dressing, etc )  - Assess/evaluate cause of self-care deficits   - Assess range of motion  - Assess patient's mobility  - Assess patient's need for assistive devices and provide as appropriate  - Encourage maximum independence but intervene and supervise when necessary  - Involve family in performance of ADLs  - Assess for home care needs following discharge   - Consider OT consult to assist with ADL evaluation and planning for discharge  - Provide patient education as appropriate  7/2/2020 1110 by Johanne Escobar RN  Outcome: Progressing  7/2/2020 1110 by Johanne Escobar RN  Outcome: Progressing  Goal: Maintain proper alignment of affected body part  Description  INTERVENTIONS:  - Support, maintain and protect limb and body alignment  - Provide patient/ family with appropriate education  7/2/2020 1110 by Johanne Escobar RN  Outcome: Progressing  7/2/2020 1110 by Johanne Escobar RN  Outcome: Progressing     Problem: NEUROSENSORY - ADULT  Goal: Achieves stable or improved neurological status  Description  INTERVENTIONS  - Monitor and report changes in neurological status  - Monitor vital signs such as temperature, blood pressure, glucose, and any other labs ordered   - Initiate measures to prevent increased intracranial pressure  - Monitor for seizure activity and implement precautions if appropriate      7/2/2020 1110 by Ruy Farrell RN  Outcome: Progressing  7/2/2020 1110 by Ruy Farrell RN  Outcome: Progressing

## 2020-07-02 NOTE — SOCIAL WORK
CM was informed by [facility] liaison who states pt is accepted and a bed is available  CM was informed by Dr Keisha Webber that pt is a tentative dc for tomorrow 7/3  CM called and spoke to pt's daughter Vonda Chaves to inform of same  Vonda Chaves is agreeable  CM discussed [facility] transport; Vonda Anastacio is agreeable to cost     CM arranged with [facility] Yasmine Dobbs for a 12pm dc to [facility] 2021  CM notified pt's daughter Vonda Chaves, pt, and  [facility] liaison of dc time  Facility transfer form completed  Chart copy requested

## 2020-07-02 NOTE — OCCUPATIONAL THERAPY NOTE
Occupational Therapy Treatment Note       07/02/20 0925   Restrictions/Precautions   Weight Bearing Precautions Per Order No   Other Precautions Bed Alarm; Chair Alarm; Fall Risk;Cognitive   Lifestyle   Autonomy Pt reports being I with ADLS, receiving assistance with IADL, and ambulating without device PTA  (+)    Reciprocal Relationships Pt lives with her niece  Unclear what level of support she is able to provide  Service to Others Retired, reports she would like to get a part time job   Intrinsic Gratification Enjoys reading   Pain Assessment   Pain Assessment Tool 0-10   Pain Location/Orientation Location: Back   ADL   Where Assessed Other (Comment)   Grooming Assistance 3  Moderate Assistance   Grooming Deficit Brushing hair   [address] 3  Moderate Assistance   UB Dressing Deficit Thread RUE; Thread LUE;Pull around back   Toileting Assistance  3  Moderate Assistance   Toileting Deficit Clothing management up;Clothing management down;Perineal hygiene   Bed Mobility   Supine to Sit 3  Moderate assistance   Additional items Assist x 1   Transfers   Sit to Stand 4  Minimal assistance   Additional items Assist x 2   Stand to Sit 4  Minimal assistance   Additional items Assist x 1   Toilet transfer 3  Moderate assistance   Additional items Assist x 1   Functional Mobility   Functional Mobility 4  Minimal assistance   Cognition   Overall Cognitive Status Impaired   Arousal/Participation Responsive   Attention Attends with cues to redirect   Orientation Level Oriented to person;Oriented to place;Oriented to time;Disoriented to situation   Memory Decreased recall of precautions;Decreased recall of recent events;Decreased short term memory   Following Commands Follows one step commands inconsistently   Assessment   Assessment Pt participated in occupational therapy with focus on activity tolerance,  bed mob, functional transfers/mob, toilet transfers/toileting    Pt cleared by LUCITA/Krissy for pt participation in occupational therapy  Pt received HOB raised/supine pt sitting upright and agreeable to therapy following pt Identifiers confirmed  Pt reported back pain and that she "wanted to be comfortable"  She required assist for bed mob, functional transfers/mob with RW to/from pt bathroom and for toilet transfers 2* pt decreased overall strength, coordination and balance  Pt will require post acute rehab services to continue to address these above noted pt deficits which currently impair pt ADL and functional mob  Pt chair alarm active post session all needs within reach     Plan   Treatment Interventions ADL retraining   Goal Expiration Date 07/08/20   OT Treatment Day 2   OT Frequency 3-5x/wk   Recommendation   OT Discharge Recommendation Post-Acute Rehabilitation Services   Barthel Index   Feeding 5   Bathing 0   Grooming Score 0   Dressing Score 0   Bladder Score 5   Bowels Score 0   Toilet Use Score 0   Transfers (Bed/Chair) Score 5   Mobility (Level Surface) Score 0   Stairs Score 0   Barthel Index Score 15   Modified [place] Scale   Modified [place] Scale 4     Keenan Perez  PUENTE/L

## 2020-07-02 NOTE — PROGRESS NOTES
Progress Note - Keshawn Chandra 66 y o  female MRN: [id number]    [facility] Encounter: [id number]      Assessment/Plan:  1  Acute hepatic encephalopathy associate with acetaminophen exposure   following  AST 79, improved  , improved  Continue to monitor  IM following    2  Anxiety  Per patient  Continue trazodone, held on admission secondary to encephalopathy, restarted by psych  Continue to monitor    3  Cognitive decline  Mini cog 4/5, recall 3/3, clock draw 1/2, unable to place hands on a clock, continue to monitor, encephalopathy contributing  TSH 1 190  Continue vitamin B12  CT head done 6/25/2020:    PARENCHYMA:  No intracranial mass, mass effect or midline shift  No CT signs of acute infarction   No acute parenchymal hemorrhage  Per daughter no memory issues prior to admission, pt needs 24/7 supportive care post hospitalization    4  [place]  Continue melatonin  Maintain circadian rhythm  Encourage daytime activity    5  Frailty  Moderate  Risk factors: limited mobility, limited caregiver support  Albumin 3 0  Protein supplementation        Subjective:   Upon exam pt is in bed  She is alert and oriented x 3  She continues with short term memory loss, periods of confusion  Objective:     Vitals: Blood pressure 114/63, pulse 102, temperature 97 5 °F (36 4 °C), resp  rate 16, height 5' (1 524 m), weight 75 kg (165 lb 5 5 oz), SpO2 94 %  ,Body mass index is 32 29 kg/m²  Intake/Output Summary (Last 24 hours) at 7/2/2020 1448  Last data filed at 7/2/2020 1301  Gross per 24 hour   Intake 358 ml   Output    Net 358 ml       Physical Exam:   General : NAD  HEENT : MMM  Heart : Normal rate, no murmur rub or gallop  Lungs : CTA no wheezes, rales or rhonchi  Abdomen : Soft, NT/ND, BS auscultated in all 4 quads     Ext :  no edema  Skin : Pink, warm, dry, age appropriate turgor and mobility  Neuro : Nonfocal  Psych : Alert and O x 3      Invasive Devices     Peripheral Intravenous Line Peripheral IV 07/01/20 Left;Proximal;Ventral (anterior) Antecubital less than 1 day          Drain            External Urinary Catheter 6 days                Lab, Imaging and other studies: I have personally reviewed pertinent reports      VTE Pharmacologic Prophylaxis: Sequential compression device (Venodyne)   VTE Mechanical Prophylaxis: sequential compression device

## 2020-07-02 NOTE — PROGRESS NOTES
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  · S/p NAC   · Awake and alert at present  · Transaminases improving  · No hepatotoxic medications    Hypothyroidism  Assessment & Plan  · TSH normal  · Continue levothyroxine 125 mcg daily    Hypertension  Assessment & Plan  · BP was on lower side this am - furosemide, Metoprolol am dose and Amlodipine held with improvement   · Continue furosemide 40 mg daily, decrease dose of Metoprolol tartrate to 50 mg q 12 hours  · Amlodipine D/C'd    Presence of retained hardware  Assessment & Plan  · Has broken screw in right 2nd toe which is asymptomatic  · Was scheduled to have it removed by Dr Vitaliy Newsome   · Note podiatry input  Outpatient follow-up  Suspected pneumonia  Assessment & Plan  · CT chest with bilateral centrally oriented alveolar opacities   · Opacities mainly due to volume overload however has elevated procalcitonin and leukocytosis  · Was found unresponsive - concern for aspiration  · Ct Cefepime/metronidazole through tomorrow and then stop  · MRSA swab negative  · Monitor temperature, WBC  · Trend procalcitonin    Hypokalemia  Assessment & Plan  · Replete    Acetaminophen toxicity  Assessment & Plan  Status post nac    Poor social situation  Assessment & Plan  · Reportedly approximately more than 3 weeks ago, she was in [facility] for a total of 3 weeks likewise due to inability to care for self  According to the niece who is in the room, her house is unkept and the utilities are turned off  It seems also that there may be some psychiatric concerns that prompted her admission    She was released approximately 3 weeks ago as mentioned, stayed the first 2 days in a hotel due to the fact that her house is not functional   After that, she has stayed with her niece   However, no longer living with niece now  · Niece states they have area on aging involved to assist with social support     Cardiomyopathy  Assessment & Plan  Wt Readings from Last 3 Encounters:   07/02/20 75 kg (165 lb 5 5 oz)       · Echo with EF 35%, segmental wall motion abnormality, mild aortic, mitral and tricuspid regurgitation  · Volume overload initially - improved with IV Lasix   · Euvolemic at present   · On Lasix 40 mg daily  · Will need eventual ischemic workup  · Plavix 75 mg daily as she is allergic to aspirin  · No statins due to transaminitis   · Continue metoprolol tartrate - dose decreased to 50 mg q 12h due to hypotension  · No ACE/ARB due to acute kidney injury    Acute respiratory failure with hypoxia and hypercapnia (HCC)  Assessment & Plan  · Due to acute CHF  · Wean O2 with diuresis    Leukocytosis  Assessment & Plan    Possibly related to pneumonia versus other etiology  Continue to treat supportively  Possible stress reaction  Continue with treatment for pneumonia  Will extend for 7 days total   Nystatin for fungal infection under breast and groin area    PHILIPPE (acute kidney injury) ([facility] [address] )  Assessment & Plan  · From volume overload with possible element of NSAID use and elevated CK  · Improved with creatinine down to 0 88 from 2 48 on presentation  · CK normalized  · Diuretics as above      VTE Pharmacologic Prophylaxis:   Pharmacologic: Heparin  Mechanical VTE Prophylaxis in Place: No    Patient Centered Rounds: I have performed bedside rounds with nursing staff today  Time Spent for Care: 15 minutes  More than 50% of total time spent on counseling and coordination of care as described above      Current Length of Stay: 7 day(s)    Current Patient Status: Inpatient   Certification Statement: The patient will continue to require additional inpatient hospital stay due to Need to monitor symptom      Code Status: Level 1 - Full Code      Subjective:   No acute distress    Objective:     Vitals:   Temp (24hrs), Av 5 °F (36 4 °C), Min:97 5 °F (36 4 °C), Max:97 5 °F (36 4 °C)    Temp:  [97 5 °F (36 4 °C)] 97 5 °F (36 4 °C)  HR:  [91-98] 98  Resp:  [16-22] 16  BP: (112-162)/(61-96) 112/63  SpO2:  [91 %-97 %] 91 %  Body mass index is 32 29 kg/m²  Input and Output Summary (last 24 hours):     No intake or output data in the 24 hours ending 20 0842    Physical Exam:     Physical Exam   Constitutional: She is oriented to person, place, and time  HENT:   Head: Normocephalic and atraumatic  Cardiovascular: Normal rate and regular rhythm  Pulmonary/Chest: Effort normal and breath sounds normal  No respiratory distress  Abdominal: Soft  Bowel sounds are normal    Musculoskeletal: Normal range of motion  She exhibits no edema  Neurological: She is alert and oriented to person, place, and time  Additional Data:     Labs:    Results from last 7 days   Lab Units 20  0509   WBC Thousand/uL 16 31*   HEMOGLOBIN g/dL 12 2   HEMATOCRIT % 39 0   PLATELETS Thousands/uL 342   NEUTROS PCT % 63   LYMPHS PCT % 18   MONOS PCT % 13*   EOS PCT % 3     Results from last 7 days   Lab Units 20  0509  20  0449   POTASSIUM mmol/L 4 0   < > 4 4   CHLORIDE mmol/L 96*   < > 98*   CO2 mmol/L 35*   < > 34*   BUN mg/dL 23   < > 17   CREATININE mg/dL 0 89   < > 0 82   CALCIUM mg/dL 8 7   < > 8 9   ALK PHOS U/L  --   --  98   ALT U/L  --   --  306*   AST U/L  --   --  79*    < > = values in this interval not displayed  Results from last 7 days   Lab Units 20  0501   INR  1 19       * I Have Reviewed All Lab Data Listed Above  * Additional Pertinent Lab Tests Reviewed:  All Labs Within Last 24 Hours Reviewed        Recent Cultures (last 7 days):     Results from last 7 days   Lab Units 20  1104 06/26/20  1121 06/26/20  1112   BLOOD CULTURE   --  No Growth After 5 Days  No Growth After 5 Days  LEGIONELLA URINARY ANTIGEN  Negative  --   --        Last 24 Hours Medication List:     Current Facility-Administered Medications:  aluminum-magnesium hydroxide-simethicone 5 mL Oral Q6H PRN Ana Luisa Yarbrough MD    cefepime 1,000 mg Intravenous Q12H Ej Cardona DO Last Rate: 1,000 mg (07/01/20 1481)   clopidogrel 75 mg Oral Daily Minerva Frank MD    furosemide 40 mg Oral Daily YG Hernández    heparin (porcine) 5,000 Units Subcutaneous Q8H [address] Ana Luisa Yarbrough MD    levothyroxine 125 mcg Oral Early Morning Ana Luisa Yarbrough MD    melatonin 6 mg Oral HS Padmini Escamilla MD    metoprolol succinate 50 mg Oral Daily Yari Wright MD    metroNIDAZOLE 500 mg Oral Q8H [address] Hetroshan Cardona DO    nystatin  Topical BID Ej Cardona DO    ondansetron 4 mg Intravenous Q6H PRN Ana Luisa Yarbrough MD    potassium chloride 40 mEq Oral Daily YG Hernández         Today, Patient Was Seen By: Magaly Tejada DO    ** Please Note: Dictation voice to text software may have been used in the creation of this document   **

## 2020-07-02 NOTE — ASSESSMENT & PLAN NOTE
· Reportedly approximately more than 3 weeks ago, she was in [facility] for a total of 3 weeks likewise due to inability to care for self  According to the niece who is in the room, her house is unkept and the utilities are turned off  It seems also that there may be some psychiatric concerns that prompted her admission  She was released approximately 3 weeks ago as mentioned, stayed the first 2 days in a hotel due to the fact that her house is not functional   After that, she has stayed with her niece   However, no longer living with niece now    · Niece states they have area on aging involved to assist with social support DEPRESSION

## 2020-07-02 NOTE — PROGRESS NOTES
NEPHROLOGY PROGRESS NOTE   Renetta Aparicio 66 y o  female MRN: [id number]  Unit/Bed#: [facility] 724-01 Encounter: [id number]    ASSESSMENT & PLAN:  53-year-old female with history of hypertension was found down and confused and brought to the hospital   Nephrology consulted for acute kidney injury  Admission creatinine 2 48  Acute kidney injury, POA  -admission creatinine 2 48, baseline not known    -Suspected to be multifactorial - component of prerenal azotemia  + possible heavy NSAID use + elevated CK -1194 on 06/25 and then trended down  UA is not consistent with a GN or AIN but does have moderate blood with only 2-4 RBCs  No hydronephrosis on CT imaging    -renal function continue to improve, creatinine down to 0 8 on 7/1 am, had slight increase in cr on repeat labs on 7/1 but today renal function stable at cr 0 89    -continue current dose of diuretics, Lasix 40 mg daily  Dose was decreased on 06/28  Clinically appears euvolemic  Weight around 75 kg    -avoid nephrotoxins, hypotension  Monitor renal function    Acute systolic CHF:  Ejection fraction 35%/new onset cardiomyopathy, cardiology on board  - Volume status improved  Continue Lasix 40 mg daily  Dose was decreased on 06/28  -continue to monitor daily weight and volume status  Primary hypertension:    -Blood pressure acceptable, continue current medication-  metoprolol and Lasix  Off amlodipine  Agree with low dose of lisinopril 5 mg daily  Monitor BP    -there could be slight increase in creatinine to 1 0-1 1 which will be acceptable  Elevated CK/rhabdomyolysis:  CK 1194 on admission and trended down to 281  Likely due to being down for extended period of time  Encephalopathy-improving  Suspected to have acute hepatic encephalopathy from chronic Tylenol toxicity, status post NAC  LFTs improving  Acute respiratory failure with hypoxia and hypercapnia-due to fluid overload  Improving  Currently not on oxygen  Suspected pneumonia -on antibiotics per primary team      Elevated troponin:  Cardiology following  New onset cardiomyopathy with ejection fraction 35%, will require ischemic evaluation as outpatient as per Cardiology    Nonhealing right foot ulcer:  Seen by vascular surgery no plan for further workup or intervention    Hypokalemia- resolved with replacement  Continue oral potassium chloride 40 mEq daily and monitor electrolytes  Hypo magnesemia:  Resolved     Discussed with primary team- Dr Enrique Harrison  Nothing further to add from a renal standpoint  Will sign off  Informed office to arrange follow-up  Ordered BMP to be done in 1 week  Feel free to call us back for any questions or concerns  SUBJECTIVE:  No new complaints  No SOB  No cough  OBJECTIVE:  Current Weight: Weight - Scale: 75 kg (165 lb 5 5 oz)  Vitals:    07/02/20 0926   BP: 114/63   Pulse: 102   Resp:    Temp:    SpO2: 94%       Intake/Output Summary (Last 24 hours) at 7/2/2020 1410  Last data filed at 7/2/2020 1301  Gross per 24 hour   Intake 358 ml   Output    Net 358 ml     Weight (last 2 days)     Date/Time   Weight    07/02/20 0600   75 (165 35)    07/01/20 0600   74 1 (163 36)    06/30/20 0600   73 1 (161 16)                Physical Exam  General:  Ill looking, awake  Eyes: Conjunctivae pink,  Sclera anicteric  ENT: lips and mucous membranes moist  Neck: supple   Chest: Clear to Auscultation both lungs,  no crackles, ronchus or wheezing  CVS: S1 & S2 present, normal rate, regular rhythm, no murmur    Abdomen: soft, non-tender, non-distended, Bowel sounds normoactive  Extremities: no edema of  legs  Skin: no rash  Neuro: awake, alert, oriented x 3     Medications:    Current Facility-Administered Medications:     aluminum-magnesium hydroxide-simethicone (MYLANTA) 200-200-20 mg/5 mL oral suspension 5 mL, 5 mL, Oral, Q6H PRN, Jarret Fortune MD    cefepime (MAXIPIME) 1,000 mg in dextrose 5 % 50 mL IVPB, 1,000 mg, Intravenous, Q12H, Ej Cardona DO, Last Rate: 100 mL/hr at 07/02/20 1210, 1,000 mg at 07/02/20 1210    clopidogrel (PLAVIX) tablet 75 mg, 75 mg, Oral, Daily, Chitra Klein MD, 75 mg at 07/02/20 5066    furosemide (LASIX) tablet 40 mg, 40 mg, Oral, Daily, YG Ramires, 40 mg at 07/02/20 0379    heparin (porcine) subcutaneous injection 5,000 Units, 5,000 Units, Subcutaneous, Q8H [address], 5,000 Units at 07/02/20 0518 **AND** [COMPLETED] Platelet count, , , Once, Chencho Hannah MD    levothyroxine tablet 125 mcg, 125 mcg, Oral, Early Morning, Chencho Hannah MD, 125 mcg at 07/02/20 0518    [START ON 7/3/2020] lisinopril (ZESTRIL) tablet 5 mg, 5 mg, Oral, Daily, YG Posadas    melatonin tablet 6 mg, 6 mg, Oral, HS, Opal Mtz MD, 6 mg at 07/01/20 2230    metoprolol succinate (TOPROL-XL) 24 hr tablet 50 mg, 50 mg, Oral, Daily, Elida Pierce MD, 50 mg at 07/02/20 5177    metroNIDAZOLE (FLAGYL) tablet 500 mg, 500 mg, Oral, Q8H [address], Hetul Cardona, DO, 500 mg at 07/02/20 0518    nystatin (MYCOSTATIN) powder, , Topical, BID, Hetul Cardona, DO    ondansetron (ZOFRAN) injection 4 mg, 4 mg, Intravenous, Q6H PRN, Chencho Hannah MD    potassium chloride (K-DUR,KLOR-CON) CR tablet 40 mEq, 40 mEq, Oral, Daily, YG Ramires, 40 mEq at 07/02/20 0925    Invasive Devices:        Lab Results:   Results from last 7 days   Lab Units 07/02/20  0509 07/01/20  1347 07/01/20  1111 07/01/20  0613 06/30/20  0449 06/29/20  0501 06/28/20  0500  06/27/20  0503  06/26/20  0528   WBC Thousand/uL 16 31*  --  19 21*  --  19 66*  --  14 87*  --  16 86*  --  23 63*   HEMOGLOBIN g/dL 12 2  --  13 3  --  12 6  --  11 9  --  11 9  --  11 5   HEMATOCRIT % 39 0  --  42 0  --  40 8  --  37 9  --  36 8  --  35 8   PLATELETS Thousands/uL 342  --  369  --  325  --  287  --  268  --  257   POTASSIUM mmol/L 4 0 3 3* 3 1* 3 1* 4 4 2 8* 3 0*   < > 2 4*   < > 2 8*   CHLORIDE mmol/L 96* 94* 94* 94* 98* 92* 97*   < > 102   < > 104   CO2 mmol/L 35* 35* 34* 37* 34* 39* 35*   < > 36*   < > 25   BUN mg/dL 23 22 20 19 17 21 34*   < > 47*   < > 52*   CREATININE mg/dL 0 89 1 06 0 91 0 80 0 82 0 88 1 13   < > 1 40*   < > 1 91*   CALCIUM mg/dL 8 7 9 0 9 2 8 6 8 9 8 1* 8 1*   < > 8 2*   < > 7 8*   MAGNESIUM mg/dL 1 7  --   --  1 7 2 3 1 4*  --   --  1 7  --  2 1   PHOSPHORUS mg/dL  --   --   --   --   --   --   --   --  2 2*  --  2 9   ALK PHOS U/L  --   --   --   --  98 102 95  --  93   < > 89   ALT U/L  --   --   --   --  306* 494* 794*  --  1,096*   < > 1,473*   AST U/L  --   --   --   --  79* 132* 321*  --  604*   < > 1,293*    < > = values in this interval not displayed  Previous work up:         Portions of the record may have been created with voice recognition software  Occasional wrong word or "sound a like" substitutions may have occurred due to the inherent limitations of voice recognition software  Read the chart carefully and recognize, using context, where substitutions have occurred  If you have any questions, please contact the dictating provider

## 2020-07-02 NOTE — RESTORATIVE TECHNICIAN NOTE
Restorative Specialist Mobility Note       Activity: Ambulate in tuttle, Ambulate in room, Bathroom privileges, Chair, Dangle, Stand at bedside(Educated/encouraged pt to ambulate with assistance 3-4 x's/day  Chair alarm on   Pt callbell, phone/tray within reach )     Assistive Device: Front wheel walker, Other (Comment)(Assist x1)       Rory KING, Restorative Technician, [employer]

## 2020-07-02 NOTE — ASSESSMENT & PLAN NOTE
Possibly related to pneumonia versus other etiology  Continue to treat supportively  Possible stress reaction  Continue with treatment for pneumonia    Will extend for 7 days total   Nystatin for fungal infection under breast and groin area

## 2020-07-02 NOTE — ASSESSMENT & PLAN NOTE
· Has broken screw in right 2nd toe which is asymptomatic  · Was scheduled to have it removed by Dr Gianni Milner   · Note podiatry input  Outpatient follow-up

## 2020-07-02 NOTE — PROGRESS NOTES
Progress Note - Cardiology   NicoleMcLaren Flintstevenson [place] 66 y o  female MRN: [id number]  Unit/Bed#: [facility] 724-01 Encounter: [id number]        Principal Problem:    Acute hepatic encephalopathy  Active Problems:    PHILIPPE (acute kidney injury) ([address] )    Elevated troponin    Transaminitis    Elevated INR    Leukocytosis    Acute respiratory failure with hypoxia and hypercapnia (HCC)    Cardiomyopathy    Poor social situation    Localized swelling on right hand    Acetaminophen toxicity    Hypokalemia    Encephalopathy    Suspected pneumonia    Foot ulcer ([address] )    Presence of retained hardware    Elevated CK    Hypertension    Hypothyroidism        Assessment/ Plan         1  Acute systolic CHF/ EF 65%  Likely nonischemic and stress induced    S/P IV diuresis  Once clinically improved likely ischemia evaluation  For now medical management  Initially diuresed with IV Lasix, now transitioned to oral Lasix 40 mg daily  GDMT- BB- changed to long acting /Ace held d/t PHILIPPE ( now resolved)  Start lisinopril 5mg daily  No longer on telemetry     2  Elevated troponin likely representing non MI troponin elevation in setting of renal insufficiency, hypoxia, and volume overload/ CMP  ECG- NSR with nonspecific ST/T wave changes  Will need ischemic evaluation in setting of #4, likely as outpatient  Pt allergic to ASA, thus plavix started     Initially found unresponsive with acute hypoxic / hypercapnic respiratory failure and acute metabolic encephalopathy       3  PHILIPPE, resolved- Cr 2 48 on admission, now 0 88  Volume overload + possible heavy NSAID use     4  Transaminitis- improving    RUQ ultrasound without acute findings  Possibly due to acute CHF/volume overload vs acetaminophen toxicity- treated with acetylcysteine  Gastroenterology eval       5  PAD  Arterial  duplex suggests arterial non occlusive disease   No further vascular workup planned     6  Toe ulceration/retained hardware- podiatry followup     7  Hypothyroidism- TSH WNL     8  HTN- normotensive  On amlodipine 5 mg daily, Lasix 40 mg daily, toprol 50mg    Pt has been  unable to care for  herself  Her house was unkept and her utilities were turned off  She was temporarily staying a hotel  Subjective/Objective   Chief Complaint/Subjective  Patient is confused  She was staring at a box of crayons when I walked in  She was wondering how  she was going to pay her car payment  She denies chest pain or shortness of breath  She complains of back pain          Vitals: /63   Pulse 102   Temp 97 5 °F (36 4 °C)   Resp 16   Ht 5' (1 524 m)   Wt 75 kg (165 lb 5 5 oz)   SpO2 94%   BMI 32 29 kg/m²     Vitals:    07/01/20 0600 07/02/20 0600   Weight: 74 1 kg (163 lb 5 8 oz) 75 kg (165 lb 5 5 oz)     Orthostatic Blood Pressures      Most Recent Value   Blood Pressure  114/63 filed at 07/02/2020 0926   Patient Position - Orthostatic VS  Sitting filed at 07/01/2020 1108          No intake or output data in the 24 hours ending 07/02/20 1011    Invasive Devices     Peripheral Intravenous Line            Peripheral IV 07/01/20 Left;Proximal;Ventral (anterior) Antecubital less than 1 day          Drain            External Urinary Catheter 6 days                Current Facility-Administered Medications   Medication Dose Route Frequency    aluminum-magnesium hydroxide-simethicone (MYLANTA) 200-200-20 mg/5 mL oral suspension 5 mL  5 mL Oral Q6H PRN    cefepime (MAXIPIME) 1,000 mg in dextrose 5 % 50 mL IVPB  1,000 mg Intravenous Q12H    clopidogrel (PLAVIX) tablet 75 mg  75 mg Oral Daily    furosemide (LASIX) tablet 40 mg  40 mg Oral Daily    heparin (porcine) subcutaneous injection 5,000 Units  5,000 Units Subcutaneous Q8H [facility]    levothyroxine tablet 125 mcg  125 mcg Oral Early Morning    melatonin tablet 6 mg  6 mg Oral HS    metoprolol succinate (TOPROL-XL) 24 hr tablet 50 mg  50 mg Oral Daily    metroNIDAZOLE (FLAGYL) tablet 500 mg  500 mg Oral Q8H [facility]    nystatin (MYCOSTATIN) powder Topical BID    ondansetron (ZOFRAN) injection 4 mg  4 mg Intravenous Q6H PRN    potassium chloride (K-DUR,KLOR-CON) CR tablet 40 mEq  40 mEq Oral Daily         Physical Exam: /63   Pulse 102   Temp 97 5 °F (36 4 °C)   Resp 16   Ht 5' (1 524 m)   Wt 75 kg (165 lb 5 5 oz)   SpO2 94%   BMI 32 29 kg/m²     General Appearance:    Alert, cooperative, no distress, appears stated age   Head:    Normocephalic, no scleral icterus   Eyes:    PERRL   Nose:   Nares normal, septum midline, no drainage    Throat:   Lips, mucosa, and tongue normal   Neck:   Supple, symmetrical, trachea midline,       no JVD       Lungs:     Clear to auscultation bilaterally, respirations unlabored   Chest Wall:    No tenderness or deformity    Heart:    Regular rate and rhythm, S1 and S2 normal, no murmur, rub   or gallop       Extremities:   Extremities normal, atraumatic, no cyanosis or edema   Pulses:   2+ and symmetric all extremities   Skin:   Skin color, texture, turgor normal, no rashes or lesions   Neurologic:   Alert and oriented to place  Follows commands                    Lab Results:   Recent Results (from the past 72 hour(s))   Comprehensive metabolic panel    Collection Time: 06/30/20  4:49 AM   Result Value Ref Range    Sodium 138 136 - 145 mmol/L    Potassium 4 4 3 5 - 5 3 mmol/L    Chloride 98 (L) 100 - 108 mmol/L    CO2 34 (H) 21 - 32 mmol/L    ANION GAP 6 4 - 13 mmol/L    BUN 17 5 - 25 mg/dL    Creatinine 0 82 0 60 - 1 30 mg/dL    Glucose 127 65 - 140 mg/dL    Calcium 8 9 8 3 - 10 1 mg/dL    AST 79 (H) 5 - 45 U/L     (H) 12 - 78 U/L    Alkaline Phosphatase 98 46 - 116 U/L    Total Protein 7 0 6 4 - 8 2 g/dL    Albumin 2 7 (L) 3 5 - 5 0 g/dL    Total Bilirubin 1 05 (H) 0 20 - 1 00 mg/dL    eGFR 70 ml/min/1 73sq m   Magnesium    Collection Time: 06/30/20  4:49 AM   Result Value Ref Range    Magnesium 2 3 1 6 - 2 6 mg/dL   CBC and differential    Collection Time: 06/30/20  4:49 AM   Result Value Ref Range WBC 19 66 (H) 4 31 - 10 16 Thousand/uL    RBC 3 98 3 81 - 5 12 Million/uL    Hemoglobin 12 6 11 5 - 15 4 g/dL    Hematocrit 40 8 34 8 - 46 1 %     (H) 82 - 98 fL    MCH 31 7 26 8 - 34 3 pg    MCHC 30 9 (L) 31 4 - 37 4 g/dL    RDW 13 2 11 6 - 15 1 %    MPV 12 6 8 9 - 12 7 fL    Platelets 013 882 - 339 Thousands/uL    nRBC 0 /100 WBCs    Neutrophils Relative 68 43 - 75 %    Immat GRANS % 1 0 - 2 %    Lymphocytes Relative 14 14 - 44 %    Monocytes Relative 14 (H) 4 - 12 %    Eosinophils Relative 2 0 - 6 %    Basophils Relative 1 0 - 1 %    Neutrophils Absolute 13 41 (H) 1 85 - 7 62 Thousands/µL    Immature Grans Absolute 0 21 (H) 0 00 - 0 20 Thousand/uL    Lymphocytes Absolute 2 69 0 60 - 4 47 Thousands/µL    Monocytes Absolute 2 78 (H) 0 17 - 1 22 Thousand/µL    Eosinophils Absolute 0 48 0 00 - 0 61 Thousand/µL    Basophils Absolute 0 09 0 00 - 0 10 Thousands/µL   Basic metabolic panel    Collection Time: 07/01/20  6:13 AM   Result Value Ref Range    Sodium 135 (L) 136 - 145 mmol/L    Potassium 3 1 (L) 3 5 - 5 3 mmol/L    Chloride 94 (L) 100 - 108 mmol/L    CO2 37 (H) 21 - 32 mmol/L    ANION GAP 4 4 - 13 mmol/L    BUN 19 5 - 25 mg/dL    Creatinine 0 80 0 60 - 1 30 mg/dL    Glucose 148 (H) 65 - 140 mg/dL    Calcium 8 6 8 3 - 10 1 mg/dL    eGFR 72 ml/min/1 73sq m   Magnesium    Collection Time: 07/01/20  6:13 AM   Result Value Ref Range    Magnesium 1 7 1 6 - 2 6 mg/dL   CBC and differential    Collection Time: 07/01/20 11:11 AM   Result Value Ref Range    WBC 19 21 (H) 4 31 - 10 16 Thousand/uL    RBC 4 18 3 81 - 5 12 Million/uL    Hemoglobin 13 3 11 5 - 15 4 g/dL    Hematocrit 42 0 34 8 - 46 1 %     (H) 82 - 98 fL    MCH 31 8 26 8 - 34 3 pg    MCHC 31 7 31 4 - 37 4 g/dL    RDW 13 2 11 6 - 15 1 %    MPV 12 6 8 9 - 12 7 fL    Platelets 138 632 - 561 Thousands/uL    nRBC 0 /100 WBCs    Neutrophils Relative 72 43 - 75 %    Immat GRANS % 1 0 - 2 %    Lymphocytes Relative 12 (L) 14 - 44 %    Monocytes Relative 13 (H) 4 - 12 %    Eosinophils Relative 1 0 - 6 %    Basophils Relative 1 0 - 1 %    Neutrophils Absolute 13 80 (H) 1 85 - 7 62 Thousands/µL    Immature Grans Absolute 0 23 (H) 0 00 - 0 20 Thousand/uL    Lymphocytes Absolute 2 36 0 60 - 4 47 Thousands/µL    Monocytes Absolute 2 45 (H) 0 17 - 1 22 Thousand/µL    Eosinophils Absolute 0 27 0 00 - 0 61 Thousand/µL    Basophils Absolute 0 10 0 00 - 0 10 Thousands/µL   Basic metabolic panel    Collection Time: 07/01/20 11:11 AM   Result Value Ref Range    Sodium 135 (L) 136 - 145 mmol/L    Potassium 3 1 (L) 3 5 - 5 3 mmol/L    Chloride 94 (L) 100 - 108 mmol/L    CO2 34 (H) 21 - 32 mmol/L    ANION GAP 7 4 - 13 mmol/L    BUN 20 5 - 25 mg/dL    Creatinine 0 91 0 60 - 1 30 mg/dL    Glucose 136 65 - 140 mg/dL    Calcium 9 2 8 3 - 10 1 mg/dL    eGFR 61 ml/min/1 73sq m   Basic metabolic panel    Collection Time: 07/01/20  1:47 PM   Result Value Ref Range    Sodium 135 (L) 136 - 145 mmol/L    Potassium 3 3 (L) 3 5 - 5 3 mmol/L    Chloride 94 (L) 100 - 108 mmol/L    CO2 35 (H) 21 - 32 mmol/L    ANION GAP 6 4 - 13 mmol/L    BUN 22 5 - 25 mg/dL    Creatinine 1 06 0 60 - 1 30 mg/dL    Glucose 217 (H) 65 - 140 mg/dL    Calcium 9 0 8 3 - 10 1 mg/dL    eGFR 51 ml/min/1 73sq m   CBC and differential    Collection Time: 07/02/20  5:09 AM   Result Value Ref Range    WBC 16 31 (H) 4 31 - 10 16 Thousand/uL    RBC 3 85 3 81 - 5 12 Million/uL    Hemoglobin 12 2 11 5 - 15 4 g/dL    Hematocrit 39 0 34 8 - 46 1 %     (H) 82 - 98 fL    MCH 31 7 26 8 - 34 3 pg    MCHC 31 3 (L) 31 4 - 37 4 g/dL    RDW 13 3 11 6 - 15 1 %    MPV 12 8 (H) 8 9 - 12 7 fL    Platelets 640 105 - 878 Thousands/uL    nRBC 0 /100 WBCs    Neutrophils Relative 63 43 - 75 %    Immat GRANS % 2 0 - 2 %    Lymphocytes Relative 18 14 - 44 %    Monocytes Relative 13 (H) 4 - 12 %    Eosinophils Relative 3 0 - 6 %    Basophils Relative 1 0 - 1 %    Neutrophils Absolute 10 32 (H) 1 85 - 7 62 Thousands/µL    Immature Grans Absolute 0 33 (H) 0 00 - 0 20 Thousand/uL    Lymphocytes Absolute 2 88 0 60 - 4 47 Thousands/µL    Monocytes Absolute 2 19 (H) 0 17 - 1 22 Thousand/µL    Eosinophils Absolute 0 48 0 00 - 0 61 Thousand/µL    Basophils Absolute 0 11 (H) 0 00 - 0 10 Thousands/µL   Basic metabolic panel    Collection Time: 07/02/20  5:09 AM   Result Value Ref Range    Sodium 135 (L) 136 - 145 mmol/L    Potassium 4 0 3 5 - 5 3 mmol/L    Chloride 96 (L) 100 - 108 mmol/L    CO2 35 (H) 21 - 32 mmol/L    ANION GAP 4 4 - 13 mmol/L    BUN 23 5 - 25 mg/dL    Creatinine 0 89 0 60 - 1 30 mg/dL    Glucose 134 65 - 140 mg/dL    Calcium 8 7 8 3 - 10 1 mg/dL    eGFR 63 ml/min/1 73sq m   Magnesium    Collection Time: 07/02/20  5:09 AM   Result Value Ref Range    Magnesium 1 7 1 6 - 2 6 mg/dL     Imaging: I have personally reviewed pertinent reports  Counseling / Coordination of Care  Total time spent today 20 minutes  Greater than 50% of total time was spent with the patient and / or family counseling and / or coordination of care

## 2020-07-02 NOTE — PROGRESS NOTES
Progress Note - Podiatry  Matt Sanchez 66 y o  female MRN: [id number]  [facility] Encounter: [id number]    Assessment:  Matt Sanchez is a 66 y o  female with:     1  Ulceration dorsal aspect right 3rd digit - probes to bone  2  Broken retained hardware (screw) right 2nd toe - asymptomatic  3  Acute hepatic encephalopathy       Plan:  - Pt was seen at bedside  ceretec scan of right foot was reviewed no evidence for infection  Plan to continue local wound care  And follow up out patient with Dr Khushi Trujillo  - Vascular service reviewed from 6/29: recommened no farther vascular work up or vascular intervention  LEADS were reviewed YRIS 1 1 and toe pressure 45 right, with 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) YRIS 1 0  And toe pressure 49 on the left  - Medical management per IM, current on cefepime and flagyl due to suspected pneumonia  - Rest per IM team     Weight bearing status: WBAT    Subjective/Objective   Chief Complaint:   Chief Complaint   Patient presents with    Shortness of Breath     pt coming form home, found cyanotic and unresponsive        Subjective: 66 y o  female was seen and evaluated at bedside  Denies pain at the LE b/l  Blood pressure 114/63, pulse 102, temperature 97 5 °F (36 4 °C), resp  rate 16, height 5' (1 524 m), weight 75 kg (165 lb 5 5 oz), SpO2 94 %  Body mass index is 32 29 kg/m²  Physical Exam:   General: Alert, cooperative and no distress  Lower Extremities: Neurovascular status at baseline bilaterally, musculoskeletal function at baseline bilaterally, no calf tenderness bilaterally  Right LE:  Wound on the dorsal aspect of  3rd toe, stable dry eschar, no erythema, no drainage, no pain, no fluctuance, no gross sign of infection                Lab, Imaging and other studies:   Results from last 7 days   Lab Units 07/02/20  0509   WBC Thousand/uL 16 31*   HEMOGLOBIN g/dL 12 2   HEMATOCRIT % 39 0   PLATELETS Thousands/uL 342   NEUTROS PCT % 63   LYMPHS PCT % 18 MONOS PCT % 13*   EOS PCT % 3     Results from last 7 days   Lab Units 07/02/20  0509  06/30/20  0449   POTASSIUM mmol/L 4 0   < > 4 4   CHLORIDE mmol/L 96*   < > 98*   CO2 mmol/L 35*   < > 34*   BUN mg/dL 23   < > 17   CREATININE mg/dL 0 89   < > 0 82   CALCIUM mg/dL 8 7   < > 8 9   ALK PHOS U/L  --   --  98   ALT U/L  --   --  306*   AST U/L  --   --  79*    < > = values in this interval not displayed  Results from last 7 days   Lab Units 06/29/20  0501   INR  1 19       Results from last 7 days   Lab Units 06/27/20  1104 06/26/20  1121 06/26/20  1112   BLOOD CULTURE   --  No Growth After 5 Days  No Growth After 5 Days     LEGIONELLA URINARY ANTIGEN  Negative  --   --            Rosamaria Guzamn DPM

## 2020-07-02 NOTE — DISCHARGE INSTRUCTIONS
Wound care instructions  1  Cleanse 3rd toe wound right foot with saline, dressed with Dermagran gauze dry dressing  This is to be done 3 times a week on Monday, Wednesday, Friday  2  Surgical shoe right foot for ambulation as tolerated    Acute Kidney Injury (PHILIPPE)  You have been diagnosed with Acute Kidney Injury (PHILIPPE)  The following information has been developed to provide you with information about PHILIPPE and treatment  What is Acute Kidney Injury (PHILIPPE)? PHILIPPE occurs when kidney function decreases over a short period of time  This condition causes a buildup of waste products in the blood and can cause fluid to build up causing swelling in the legs and shortness of breath  Sometimes called Acute Kidney Failure or Acute Renal Failure, PHILIPPE is often reversible if it is found and treated quickly  How do you know if you have PHILIPPE? PHILIPPE is diagnosed by assessing kidney function  This is done by obtaining a blood test to measure the blood level of creatinine  Decreased urine output can sometimes also indicate PHILIPPE  Who is at risk for PHILIPPE? PHILIPPE can happen to anyone but usually happens to people who are already sick and may be in the hospital  People are at higher risk for PHILIPPE if they have any of the following:   age 72 years or older   high or low blood pressure   underlying kidney disease (e g , Chronic Kidney Disease (CKD)   peripheral vascular disease (hardening of arteries)   chronic diseases such as liver disease, heart disease and diabetes   a single kidney    What are the symptoms of PHILIPPE? You may or may not have the symptoms to suggest you have kidney injury until the PHILIPPE has progressed  Some of the symptoms are listed below:   Not making enough urine   Increased swelling in legs    Feeling tired   Trouble breathing or shortness of breath   Nausea   New or worsening confusion    What causes PHILIPPE?   The causes are divided into three categories:   Not enough blood flowing to the kidneys (e g , low blood pressure, bleeding, diarrhea, dehydration)   Injury directly to the kidneys (e g , blood clots, severe infections such as sepsis, medicine toxicity, IV contrast dye used for cardiac catheterization or CT scans)   Blockage to the tubes (ureters) that drain the urine from the kidneys (e g , enlarged prostate, kidney stones, blood clots)    What is the treatment for PHILIPPE? The treatment for PHILIPPE depends on correcting what caused it  Treatment usually involves removing the cause and measures to prevent further injury to the kidneys  This may require the use of intravenous fluids or medications and/or temporary dialysis  Dialysis is a process using a machine that does the job of the kidneys to remove waste and help correct the electrolyte and fluid balance while the kidneys are recovering  If dialysis is needed to treat PHILIPPE, the doctor will assess daily to see if the kidneys are showing signs of recovery  The daily assessments determine how long dialysis needs to continue  Depending on the cause and the extent of damage, an episode of PHILIPPE may resolve in a few days to several weeks to several months  What are the long term effects of having an episode of PHILIPPE?  People who have one episode of PHILIPPE are at an increased risk of having another episode of PHILIPPE as well as other health problems such as kidney disease, stroke, and heart disease   In a small number of people who had unrecognized kidney disease, an PHILIPPE episode may result in Chronic Kidney Disease (CKD) which requires lifelong monitoring and treatment  How do you prevent future episodes of PHILIPPE?  Make sure to follow up with the kidney doctor after hospital discharge and obtain blood work to reassess and monitor kidney function     If you have diabetes, keep your blood sugar in goal range and keep appointments with your diabetes specialist    Maryse Beckett If you have high blood pressure, have your blood pressure checked regularly to make sure it is in target range  o If you take blood pressure medicine called ACEIs or ARBs (e g , Lisinopril, Enalapril, Diovan, Losartan), your doctor may tell you to skip a dose or two if you have severe dehydration and your blood pressure is running low   Avoid using medicines such as NSAIDs (Nonsteroidal Anti-inflammatory Drugs) and [place]-2 Inhibitors (a type of NSAID) that may be harmful to kidney function  These may include the medicines listed in the table that follows  Examples of NSAIDS and Montes [place] Oca-2 Inhibitors   Talk to your doctor or healthcare provider before stopping any medicine ordered for you  Celecoxib (CELEBREX) Ketoprofen (ORUDIS, ORUVAIL)   Diclofenac (VOLTAREN, CATAFLAM) Ketorolac (TORADOL)   Diflunisal (DOLOBID) Meloxicam (MOBIC)   Etodolac (LODINE) Nabumetone (RELAFEN)   Fenoprofen (NALFON) Naproxen (ALEVE, NAPROSYN,    NAPRELAN, ANAPROX)   Flurbiprofen (ANSAID) Oxaprozin (DAYPRO)   Ibuprofen (MOTRIN, ADVIL) Piroxicam (FELDENE)   Indomethacin (INDOCIN) Sulindac (CLINORIL)    Tolmetin (Kennth Deann)     Is there a special diet for people with PHILIPPE? People with PHILIPPE and/or other kidney disease often have high potassium and phosphorus levels in their blood  To protect the kidneys from further injury and to avoid complications, most doctors recommend following a healthy diet choosing foods low potassium and low phosphorus   Limiting dietary potassium to 2 5 grams/day and phosphorus to 800 milligrams/day is recommended   A dietitian can help you with learning more about this type of diet   The tables on the following page may help you to choose lower potassium and phosphorus foods  The following websites are also good sources of information:  [place] at  org/nutrition/Kidney-Disease-Stages-1-4 Bryon stinson  https://www niddk nih gov/health-information/kidney-disease/chronic-kidney-disease-ckd/eating-nutrition  https://[facility]/health/articles/15641-renal-diet-basics  [employer] cy    If you have any questions or concerns about your condition, please contact your doctor or healthcare provider  These tables may help you to choose lower potassium and phosphorus foods    AVOID these higher phosphorus* foods: CHOOSE these lower phosphorus* foods:   Milk, pudding , yogurt made from animals and from many soy varieties Rice milk (unfortified), nondairy creamer   Hard cheeses, ricotta, cottage cheese, fat-free cream cheese Regular and low-fat cream cheese   Ice cream, frozen yogurt Sherbet, frozen fruit pops, sorbet   Soups made with milk, dried peas, beans, lentils or other high phosphorus ingredients Soups made with broth, are water-based, or other lower phosphorus ingredients   Whole grains, including whole-grain breads, crackers, cereal, rice and pasta, corn tortillas Refined grains, including white bread, crackers, cereals, rice and pasta   Quick breads, biscuits, cornbread, muffins, pancakes, waffles, granola, wheat germ Homemade refined (white) dinner rolls, bagels, English muffins, sugar cookies, shortbread cookies, sarah food cake   Dried peas (split, black-eyed), beans (black, garbanzo, lima, kidney, navy, tapia), lentils Green peas (canned, frozen), green beans, wax beans   Organ meats, walleye, [place], sardines Lean beef, pork, lamb, poultry, other fish   Nuts and seeds Popcorn   Peanut butter, other nut butters; tofu, veggie or soy burgers Jam, jelly, honey   Chocolate, including chocolate drinks Carob (chocolate-flavored) candy, hard candy,  gumdrops   Lilly, pepper-type sodas, flavored sharma, bottled teas, beer Lemon-lime soda, ginger ale or root beer, plain water, cream soda, grape soda   *Always read labels and avoid foods with ingredients containing "phos"  AVOID these higher potassium foods: CHOOSE these lower potassium foods:      Milk (fat free, low fat, whole, buttermilk, Soy), yogurt    Regular and low-fat cream cheese      Beans (white, [place]), [place] sprouts, spinach Swiss       chard, broccoli, avocado, artichoke, potatoes, sweet      potatoes, tomatoes/tomato sauce, beet greens      Green beans, alfalfa sprouts, bamboo shoots (canned),       cabbage, carrots, cauliflower, corn, cucumber, eggplant,      endive, lettuce, mushrooms, onions, radishes,  watercress,       water chestnuts (canned), rice, peas      Halibut, tuna, cod, snapper, tuna fish, turkey    Egg, lean beef, pork, lamb, shellfish, chicken       Banana, papaya, orange, cantaloupe, dates, raisins and      other dried fruit, pomegranate, avocado      Apple, applesauce, blackberries, raspberries, pears,     watermelon, cucumbers, blueberries, cranberries,       peaches      Almonds, peanuts, hazelnuts, [place], cashew, mixed,      seeds (sunflower, pumpkin)     Homemade refined (white) dinner rolls, bagels,      English muffins, flour tortilla, crackers, shreya      crackers, popcorn, pretzels, spaghetti or macaroni,       hummus      Tomato or vegetable juice, prune juice    Papaya, edita, or pear nectar, cranberry juice cocktail      Molasses

## 2020-07-02 NOTE — ASSESSMENT & PLAN NOTE
Wt Readings from Last 3 Encounters:   07/02/20 75 kg (165 lb 5 5 oz)       · Echo with EF 35%, segmental wall motion abnormality, mild aortic, mitral and tricuspid regurgitation  · Volume overload initially - improved with IV Lasix   · Euvolemic at present   · On Lasix 40 mg daily  · Will need eventual ischemic workup  · Plavix 75 mg daily as she is allergic to aspirin  · No statins due to transaminitis   · Continue metoprolol tartrate - dose decreased to 50 mg q 12h due to hypotension  · No ACE/ARB due to acute kidney injury

## 2020-07-03 VITALS
DIASTOLIC BLOOD PRESSURE: 84 MMHG | BODY MASS INDEX: 31.81 KG/M2 | RESPIRATION RATE: 17 BRPM | SYSTOLIC BLOOD PRESSURE: 141 MMHG | OXYGEN SATURATION: 94 % | TEMPERATURE: 97.7 F | WEIGHT: 162.04 LBS | HEART RATE: 102 BPM | HEIGHT: 60 IN

## 2020-07-03 LAB
ALBUMIN SERPL BCP-MCNC: 3.3 G/DL (ref 3.5–5)
ALP SERPL-CCNC: 88 U/L (ref 46–116)
ALT SERPL W P-5'-P-CCNC: 120 U/L (ref 12–78)
ANION GAP SERPL CALCULATED.3IONS-SCNC: 5 MMOL/L (ref 4–13)
AST SERPL W P-5'-P-CCNC: 47 U/L (ref 5–45)
BASOPHILS # BLD AUTO: 0.1 THOUSANDS/ΜL (ref 0–0.1)
BASOPHILS NFR BLD AUTO: 1 % (ref 0–1)
BILIRUB SERPL-MCNC: 0.75 MG/DL (ref 0.2–1)
BUN SERPL-MCNC: 23 MG/DL (ref 5–25)
CALCIUM SERPL-MCNC: 8.9 MG/DL (ref 8.3–10.1)
CHLORIDE SERPL-SCNC: 97 MMOL/L (ref 100–108)
CO2 SERPL-SCNC: 34 MMOL/L (ref 21–32)
CREAT SERPL-MCNC: 1.05 MG/DL (ref 0.6–1.3)
EOSINOPHIL # BLD AUTO: 0.47 THOUSAND/ΜL (ref 0–0.61)
EOSINOPHIL NFR BLD AUTO: 3 % (ref 0–6)
ERYTHROCYTE [DISTWIDTH] IN BLOOD BY AUTOMATED COUNT: 13.2 % (ref 11.6–15.1)
GFR SERPL CREATININE-BSD FRML MDRD: 52 ML/MIN/1.73SQ M
GLUCOSE SERPL-MCNC: 137 MG/DL (ref 65–140)
HCT VFR BLD AUTO: 40.4 % (ref 34.8–46.1)
HGB BLD-MCNC: 12.6 G/DL (ref 11.5–15.4)
IMM GRANULOCYTES # BLD AUTO: 0.3 THOUSAND/UL (ref 0–0.2)
IMM GRANULOCYTES NFR BLD AUTO: 2 % (ref 0–2)
LYMPHOCYTES # BLD AUTO: 2.17 THOUSANDS/ΜL (ref 0.6–4.47)
LYMPHOCYTES NFR BLD AUTO: 14 % (ref 14–44)
MCH RBC QN AUTO: 32 PG (ref 26.8–34.3)
MCHC RBC AUTO-ENTMCNC: 31.2 G/DL (ref 31.4–37.4)
MCV RBC AUTO: 103 FL (ref 82–98)
MONOCYTES # BLD AUTO: 1.85 THOUSAND/ΜL (ref 0.17–1.22)
MONOCYTES NFR BLD AUTO: 12 % (ref 4–12)
NEUTROPHILS # BLD AUTO: 10.29 THOUSANDS/ΜL (ref 1.85–7.62)
NEUTS SEG NFR BLD AUTO: 68 % (ref 43–75)
NRBC BLD AUTO-RTO: 0 /100 WBCS
PLATELET # BLD AUTO: 361 THOUSANDS/UL (ref 149–390)
PMV BLD AUTO: 13.1 FL (ref 8.9–12.7)
POTASSIUM SERPL-SCNC: 3.7 MMOL/L (ref 3.5–5.3)
PROT SERPL-MCNC: 7.7 G/DL (ref 6.4–8.2)
RBC # BLD AUTO: 3.94 MILLION/UL (ref 3.81–5.12)
SODIUM SERPL-SCNC: 136 MMOL/L (ref 136–145)
WBC # BLD AUTO: 15.18 THOUSAND/UL (ref 4.31–10.16)

## 2020-07-03 PROCEDURE — 97116 GAIT TRAINING THERAPY: CPT

## 2020-07-03 PROCEDURE — 85025 COMPLETE CBC W/AUTO DIFF WBC: CPT | Performed by: INTERNAL MEDICINE

## 2020-07-03 PROCEDURE — 97530 THERAPEUTIC ACTIVITIES: CPT

## 2020-07-03 PROCEDURE — 99238 HOSP IP/OBS DSCHRG MGMT 30/<: CPT | Performed by: INTERNAL MEDICINE

## 2020-07-03 PROCEDURE — 80053 COMPREHEN METABOLIC PANEL: CPT | Performed by: INTERNAL MEDICINE

## 2020-07-03 RX ORDER — NYSTATIN 100000 [USP'U]/G
POWDER TOPICAL 2 TIMES DAILY
Qty: 15 G | Refills: 0 | Status: SHIPPED | OUTPATIENT
Start: 2020-07-03

## 2020-07-03 RX ORDER — FUROSEMIDE 40 MG/1
40 TABLET ORAL DAILY
Qty: 30 TABLET | Refills: 0 | Status: SHIPPED | OUTPATIENT
Start: 2020-07-04

## 2020-07-03 RX ORDER — CLOPIDOGREL BISULFATE 75 MG/1
75 TABLET ORAL DAILY
Qty: 30 TABLET | Refills: 0 | Status: SHIPPED | OUTPATIENT
Start: 2020-07-04

## 2020-07-03 RX ORDER — POTASSIUM CHLORIDE 20 MEQ/1
40 TABLET, EXTENDED RELEASE ORAL DAILY
Qty: 60 TABLET | Refills: 0 | Status: SHIPPED | OUTPATIENT
Start: 2020-07-04

## 2020-07-03 RX ORDER — LISINOPRIL 5 MG/1
5 TABLET ORAL DAILY
Qty: 30 TABLET | Refills: 0 | Status: SHIPPED | OUTPATIENT
Start: 2020-07-04

## 2020-07-03 RX ORDER — METOPROLOL SUCCINATE 50 MG/1
50 TABLET, EXTENDED RELEASE ORAL DAILY
Qty: 30 TABLET | Refills: 0 | Status: SHIPPED | OUTPATIENT
Start: 2020-07-04

## 2020-07-03 RX ORDER — LANOLIN ALCOHOL/MO/W.PET/CERES
6 CREAM (GRAM) TOPICAL
Qty: 30 TABLET | Refills: 0 | Status: SHIPPED | OUTPATIENT
Start: 2020-07-03

## 2020-07-03 RX ADMIN — POTASSIUM CHLORIDE 40 MEQ: 1500 TABLET, EXTENDED RELEASE ORAL at 08:36

## 2020-07-03 RX ADMIN — CLOPIDOGREL BISULFATE 75 MG: 75 TABLET ORAL at 08:36

## 2020-07-03 RX ADMIN — FUROSEMIDE 40 MG: 40 TABLET ORAL at 08:37

## 2020-07-03 RX ADMIN — METRONIDAZOLE 500 MG: 500 TABLET ORAL at 13:27

## 2020-07-03 RX ADMIN — LEVOTHYROXINE SODIUM 125 MCG: 125 TABLET ORAL at 05:19

## 2020-07-03 RX ADMIN — METOPROLOL SUCCINATE 50 MG: 50 TABLET, EXTENDED RELEASE ORAL at 08:37

## 2020-07-03 RX ADMIN — LISINOPRIL 5 MG: 5 TABLET ORAL at 08:43

## 2020-07-03 RX ADMIN — METRONIDAZOLE 500 MG: 500 TABLET ORAL at 05:19

## 2020-07-03 RX ADMIN — HEPARIN SODIUM 5000 UNITS: 5000 INJECTION INTRAVENOUS; SUBCUTANEOUS at 13:27

## 2020-07-03 RX ADMIN — NYSTATIN: 100000 POWDER TOPICAL at 08:37

## 2020-07-03 RX ADMIN — CEFEPIME HYDROCHLORIDE 1000 MG: 1 INJECTION, POWDER, FOR SOLUTION INTRAMUSCULAR; INTRAVENOUS at 11:29

## 2020-07-03 RX ADMIN — HEPARIN SODIUM 5000 UNITS: 5000 INJECTION INTRAVENOUS; SUBCUTANEOUS at 05:19

## 2020-07-03 NOTE — RESTORATIVE TECHNICIAN NOTE
Restorative Specialist Mobility Note       Activity: Ambulate in room, Bathroom privileges, Chair, Dangle, Stand at bedside(Educated/encouraged pt to ambulate with assistance 3-4 x's/day  Chair alarm on   Pt callbell, phone/tray within reach )     Assistive Device: Front wheel walker, Other (Comment)(R Surgical shoe on )    Alberto Thacker BS, Restorative Technician, [employer]

## 2020-07-03 NOTE — ASSESSMENT & PLAN NOTE
· CT chest with bilateral centrally oriented alveolar opacities   · Opacities mainly due to volume overload however has elevated procalcitonin and leukocytosis  · Was found unresponsive - concern for aspiration  · Ct Cefepime/metronidazole through tomorrow and then stop  · MRSA swab negative  · White blood cell appears to be trending downward    · Trend procalcitonin

## 2020-07-03 NOTE — ASSESSMENT & PLAN NOTE
Possibly related to pneumonia versus other etiology  Continue to treat supportively  Possible stress reaction  Continue with treatment for pneumonia  Seven days total of antibiotic therapy  Nystatin for fungal infection under breast and groin area  White cell count trending downward

## 2020-07-03 NOTE — ASSESSMENT & PLAN NOTE
· Reportedly approximately more than 3 weeks ago, she was in [facility] for a total of 3 weeks likewise due to inability to care for self  According to the niece who is in the room, her house is unkept and the utilities are turned off  It seems also that there may be some psychiatric concerns that prompted her admission  She was released approximately 3 weeks ago as mentioned, stayed the first 2 days in a hotel due to the fact that her house is not functional   After that, she has stayed with her niece   However, no longer living with niece now    · Niece states they have area on aging involved to assist with social support

## 2020-07-03 NOTE — PHYSICAL THERAPY NOTE
Physical Therapy Treatment Note     Patient Name: Colton Keita    XJUKL'D Date: 7/3/2020     Problem List  Principal Problem:    Acute hepatic encephalopathy  Active Problems:    PHILIPPE (acute kidney injury) ([address] )    Elevated troponin    Transaminitis    Elevated INR    Leukocytosis    Acute respiratory failure with hypoxia and hypercapnia (HCC)    Cardiomyopathy    Poor social situation    Localized swelling on right hand    Acetaminophen toxicity    Hypokalemia    Encephalopathy    Suspected pneumonia    Foot ulcer ([address] )    Presence of retained hardware    Elevated CK    Hypertension    Hypothyroidism       Past Medical History  Past Medical History:   Diagnosis Date    Allergies     Anxiety     Asthma     Disease of thyroid gland     High blood pressure     Hyperlipemia     Hypertension     Sleep disorder         Past Surgical History  History reviewed  No pertinent surgical history  07/03/20 1026   Pain Assessment   Pain Assessment Tool Pain Assessment not indicated - pt denies pain   Pain Score No Pain   Restrictions/Precautions   Weight Bearing Precautions Per Order No   Other Precautions Bed Alarm; Chair Alarm; Fall Risk   General   Chart Reviewed Yes   Cognition   Overall Cognitive Status Impaired   Arousal/Participation Responsive   Attention Attends with cues to redirect   Orientation Level Oriented to person;Oriented to place   Memory Decreased recall of precautions;Decreased recall of recent events   Following Commands Follows one step commands with increased time or repetition   Comments Flat affect; more appropriate today and receptive to therapy   Bed Mobility   Supine to Sit 4  Minimal assistance   Additional items Assist x 1; Increased time required; Impulsive   Additional Comments Upon arrival, patient is incontient of urine/bowel - required increased time to clean up   Concluded session with patient seated OOB with chair alarm activated Transfers   Sit to Stand 4  Minimal assistance   Additional items Assist x 1; Increased time required;Verbal cues   Stand to Sit 4  Minimal assistance   Additional items Assist x 1; Increased time required;Verbal cues   Toilet transfer 3  Moderate assistance   Additional items Assist x 1; Increased time required;Verbal cues;Standard toilet   Additional Comments Performed with RW - poor carryover of proper hand placement - frequent verbal cues to re-correct hand posistion on RW  Performed approx ~6 STS during session   Ambulation/Elevation   Gait pattern Short stride; Foward flexed; Shuffling;Excessively slow   Gait Assistance 4  Minimal assist   Additional items Assist x 1;Verbal cues  (VC for RW management and negotiation )   Assistive Device Rolling walker   Distance 50 feet x 2- excessively slow pace - required 1 standing rest break    Balance   Static Sitting Fair -   Dynamic Sitting Fair -   Static Standing Fair -   Dynamic Standing Poor +   Ambulatory Poor +   Activity Tolerance   Activity Tolerance Patient limited by fatigue   Nurse Made Aware Yes, LUCITA Rey   Exercises   Balance training  Unsupported standing x12 minutes at sink - required min A x 1 for standing balance - performed ADLs (Washing hands, grooming, and clean her teeth)   Assessment   Prognosis Good   Problem List Decreased endurance; Impaired balance;Decreased mobility; Decreased cognition;Decreased safety awareness; Impaired judgement;Decreased coordination   Assessment Patient seen today for physical therapy session on education, bed mobility, functional transfers, and gait training  Patient updated to WBAT in RLE with surgical shoe; therapist provided patient with a surgical shoe and educated patient to wear at all times for OOB mobility  Patient more appropriate today and however continues to have a flat affect  Requires significant time for processing and response time   Overall, patient is making steady progress towards therapy goals and improved activity tolerance noted  Pt does require multiple rest breaks and increased WOB observed during functional mobility  Ambulation distance self-limited by patient 2* fatigue  Recommend sTR upon discharge once medically stable  Goals   Patient Goals "To be back to normal"   [facility] Expiration Date 07/06/20   PT Treatment Day 3   Plan   Treatment/Interventions LE strengthening/ROM; Functional transfer training; Therapeutic exercise; Endurance training;Patient/family training;Bed mobility;Gait training;Cognitive reorientation   Progress Progressing toward goals   PT Frequency   (3-5x/wk)   Recommendation   PT Discharge Recommendation Post-Acute Rehabilitation Services   Equipment Recommended Walker   PT - OK to Discharge Yes  (to STR once medically stable)       Colton Smith PT, DPT

## 2020-07-03 NOTE — DISCHARGE SUMMARY
Discharge- Dayan Husain 1945, 66 y o  female MRN: [id number]    Unit/Bed#: [facility] 724-01 Encounter: [id number]    Primary Care Provider: No primary care provider on file  Date and time admitted to hospital: 6/25/2020  3:20 PM        * Acute hepatic encephalopathy  Assessment & Plan  · Patient was found unresponsive   · Hepatic encephalopathy likely due to chronic acetaminophen toxicity  · S/p NAC   · Awake and alert at present  · Transaminases improving  · No hepatotoxic medications    Hypothyroidism  Assessment & Plan  · TSH normal  · Continue levothyroxine 125 mcg daily    Hypertension  Assessment & Plan  · BP was on lower side this am - furosemide, Metoprolol am dose and Amlodipine held with improvement   · Continue furosemide 40 mg daily, decrease dose of Metoprolol tartrate to 50 mg q 12 hours  · Amlodipine D/C'd    Presence of retained hardware  Assessment & Plan  · Has broken screw in right 2nd toe which is asymptomatic  · Was scheduled to have it removed by Dr Manuel Felder   · Note podiatry input  Outpatient follow-up  Foot ulcer ([address] )  Assessment & Plan  · Ulceration which probes to bone noted over the dorsal aspect of the right 3rd digit  · Lower extremity doppler - 50 - 75% stenosis in the tibial peroneal trunk bilaterally with evidence of tibio-peroneal disease  Diffuse disease without evidence of hemodyndamically significant stenosis in the femoral to popliteal arteries  · Completed bone scan - f/u results  · Vascular input noted  No intervention indicated as per vascular surgery  Follow-up with outpatient services with podiatry  Suspected pneumonia  Assessment & Plan  · CT chest with bilateral centrally oriented alveolar opacities   · Opacities mainly due to volume overload however has elevated procalcitonin and leukocytosis  · Was found unresponsive - concern for aspiration  · Ct Cefepime/metronidazole through tomorrow and then stop    · MRSA swab negative  · White blood cell appears to be trending downward  · Trend procalcitonin    Hypokalemia  Assessment & Plan  · Replete    Acetaminophen toxicity  Assessment & Plan  Status post nac    Localized swelling on right hand  Assessment & Plan  · Doppler ordered    Poor social situation  Assessment & Plan  · Reportedly approximately more than 3 weeks ago, she was in [facility] for a total of 3 weeks likewise due to inability to care for self  According to the niece who is in the room, her house is unkept and the utilities are turned off  It seems also that there may be some psychiatric concerns that prompted her admission  She was released approximately 3 weeks ago as mentioned, stayed the first 2 days in a hotel due to the fact that her house is not functional   After that, she has stayed with her niece   However, no longer living with niece now  · Niece states they have area on aging involved to assist with social support     Leukocytosis  Assessment & Plan    Possibly related to pneumonia versus other etiology  Continue to treat supportively  Possible stress reaction  Continue with treatment for pneumonia  Seven days total of antibiotic therapy  Nystatin for fungal infection under breast and groin area  White cell count trending downward  Transaminitis  Assessment & Plan  · Most likely as a result of chronic Tylenol toxicity as patient likes taking Tylenol p m  without telling others  She is also on some medications which are metabolized hepatically  Will discontinue those medications  Hold off on any Tylenol  · GI consult      · Toxicology consult                   Resolved Problems  Date Reviewed: 6/26/2020    None          Admission Date:   Admission Orders (From admission, onward)     Ordered        06/25/20 2147  Inpatient Admission  Once                     Admitting Diagnosis: Pulmonary edema [J81 1]  Respiratory distress [R06 03]  Transaminitis [R74 0]  Hypoxia [R09 02]  Elevated troponin [R79 89]  Elevated LFTs [R79 89]  PHILIPPE (acute kidney injury) ([facility] ) [N17 9]    HPI:  This is a very pleasant 57-year-old female with known history of insomnia and depression hyperlipidemia and hypertension who presents the hospital a reported unresponsive episode  Patient reportedly was seen a [facility] approximately 3 weeks ago for a reported inability to care for herself at home  According to her niece on initial presentation to the hospital here she was noted to be uncapped with all her utilities turned off  She was reported to be staying at a hotel due to the fact that her house was nonfunctional    Over the past week however prior to admission she was noted to have increasing lethargy  This was attributed to polypharmacy initially however she was brought in due to worsening progression of confusion/mental status change  She presented with a robust transaminitis with ALT and AST in the 3000 range  She was brought in for accidental Tylenol overdose with history of chronic Tylenol use  She is status post N-acetylcysteine treatment  · Acute hepatic encephalopathy in setting of Tylenol overdose  Status post neck treatment  Patient is medically stable currently  No further symptoms  · LFTs appears to have trended downward  · No further intervention  · Appears to be accidental overdose with chronic ingestion of Tylenol  · Suspected pneumonia-status post full course of antibiotic therapy  · Leukocytosis-appears to be trending downward  May be multifactorial in nature  Patient with fungal infection and skin treated with nystatin  Will continue with nystatin for 5 more days  Patient status post pneumonia treatment  · Foot [id number] with primary podiatrist as outpatient  Patient was seen by vascular  No further intervention recommended at this particular juncture by vascular surgery    · Hypothyroidism-levothyroxine      Procedures Performed:   Orders Placed This Encounter   Procedures   [facility] Condition at Discharge: fair         Discharge instructions/Information to patient and family:   See after visit summary for information provided to patient and family  Provisions for Follow-Up Care:  See after visit summary for information related to follow-up care and any pertinent home health orders  PCP: No primary care provider on file  Disposition: Home    Planned Readmission: No    Discharge Statement   I spent 35 minutes discharging the patient  This time was spent on the day of discharge  I had direct contact with the patient on the day of discharge  Additional documentation is required if more than 30 minutes were spent on discharge  Discharge Medications:  See after visit summary for reconciled discharge medications provided to patient and family

## 2020-07-03 NOTE — ASSESSMENT & PLAN NOTE
· Ulceration which probes to bone noted over the dorsal aspect of the right 3rd digit  · Lower extremity doppler - 50 - 75% stenosis in the tibial peroneal trunk bilaterally with evidence of tibio-peroneal disease  Diffuse disease without evidence of hemodyndamically significant stenosis in the femoral to popliteal arteries  · Completed bone scan - f/u results  · Vascular input noted  No intervention indicated as per vascular surgery  Follow-up with outpatient services with podiatry

## 2020-07-03 NOTE — PLAN OF CARE
Problem: Potential for Falls  Goal: Patient will remain free of falls  Description  INTERVENTIONS:  - Assess patient frequently for physical needs  -  Identify cognitive and physical deficits and behaviors that affect risk of falls    -  [place] fall precautions as indicated by assessment   - Educate patient/family on patient safety including physical limitations  - Instruct patient to call for assistance with activity based on assessment  - Modify environment to reduce risk of injury  - Consider OT/PT consult to assist with strengthening/mobility  Outcome: Progressing     Problem: Prexisting or High Potential for Compromised Skin Integrity  Goal: Skin integrity is maintained or improved  Description  INTERVENTIONS:  - Identify patients at risk for skin breakdown  - Assess and monitor skin integrity  - Assess and monitor nutrition and hydration status  - Monitor labs   - Assess for incontinence   - Turn and reposition patient  - Assist with mobility/ambulation  - Relieve pressure over bony prominences  - Avoid friction and shearing  - Provide appropriate hygiene as needed including keeping skin clean and dry  - Evaluate need for skin moisturizer/barrier cream  - Collaborate with interdisciplinary team   - Patient/family teaching  - Consider wound care consult   Outcome: Progressing     Problem: SAFETY ADULT  Goal: Maintain or return to baseline ADL function  Description  INTERVENTIONS:  -  Assess patient's ability to carry out ADLs; assess patient's baseline for ADL function and identify physical deficits which impact ability to perform ADLs (bathing, care of mouth/teeth, toileting, grooming, dressing, etc )  - Assess/evaluate cause of self-care deficits   - Assess range of motion  - Assess patient's mobility; develop plan if impaired  - Assess patient's need for assistive devices and provide as appropriate  - Encourage maximum independence but intervene and supervise when necessary  - Involve family in performance of ADLs  - Assess for home care needs following discharge   - Consider OT consult to assist with ADL evaluation and planning for discharge  - Provide patient education as appropriate  Outcome: Progressing  Goal: Maintain or return mobility status to optimal level  Description  INTERVENTIONS:  - Assess patient's baseline mobility status (ambulation, transfers, stairs, etc )    - Identify cognitive and physical deficits and behaviors that affect mobility  - Identify mobility aids required to assist with transfers and/or ambulation (gait belt, sit-to-stand, lift, walker, cane, etc )  - [place] fall precautions as indicated by assessment  - Record patient progress and toleration of activity level on Mobility SBAR; progress patient to next Phase/Stage  - Instruct patient to call for assistance with activity based on assessment  - Consider rehabilitation consult to assist with strengthening/weightbearing, etc   Outcome: Progressing     Problem: DISCHARGE PLANNING  Goal: Discharge to home or other facility with appropriate resources  Description  INTERVENTIONS:  - Identify barriers to discharge w/patient and caregiver  - Arrange for needed discharge resources and transportation as appropriate  - Identify discharge learning needs (meds, wound care, etc )  - Refer to Case Management Department for coordinating discharge planning if the patient needs post-hospital services based on physician/advanced practitioner order or complex needs related to functional status, cognitive ability, or social support system   Outcome: Progressing     Problem: CARDIOVASCULAR - ADULT  Goal: Maintains optimal cardiac output and hemodynamic stability  Description  INTERVENTIONS:  - Monitor I/O, vital signs and rhythm  - Monitor for S/S and trends of decreased cardiac output  - Administer and titrate ordered vasoactive medications to optimize hemodynamic stability  - Assess quality of pulses, skin color and temperature  - Assess for signs of decreased coronary artery perfusion  - Instruct patient to report change in severity of symptoms  Outcome: Progressing     Problem: METABOLIC, FLUID AND ELECTROLYTES - ADULT  Goal: Electrolytes maintained within normal limits  Description  INTERVENTIONS:  - Monitor labs and assess patient for signs and symptoms of electrolyte imbalances  - Administer electrolyte replacement as ordered  - Monitor response to electrolyte replacements, including repeat lab results as appropriate  - Instruct patient on fluid and nutrition as appropriate  Outcome: Progressing  Goal: Fluid balance maintained  Description  INTERVENTIONS:  - Monitor labs   - Monitor I/O and WT  - Instruct patient on fluid and nutrition as appropriate  - Assess for signs & symptoms of volume excess or deficit  Outcome: Progressing     Problem: SKIN/TISSUE INTEGRITY - ADULT  Goal: Skin integrity remains intact  Description  INTERVENTIONS  - Identify patients at risk for skin breakdown  - Assess and monitor skin integrity  - Assess and monitor nutrition and hydration status  - Monitor labs (i e  albumin)  - Assess for incontinence   - Turn and reposition patient  - Assist with mobility/ambulation  - Relieve pressure over bony prominences  - Avoid friction and shearing  - Provide appropriate hygiene as needed including keeping skin clean and dry  - Evaluate need for skin moisturizer/barrier cream  - Collaborate with interdisciplinary team (i e  Nutrition, Rehabilitation, etc )   - Patient/family teaching  Outcome: Progressing     Problem: MUSCULOSKELETAL - ADULT  Goal: Maintain or return mobility to safest level of function  Description  INTERVENTIONS:  - Assess patient's ability to carry out ADLs; assess patient's baseline for ADL function and identify physical deficits which impact ability to perform ADLs (bathing, care of mouth/teeth, toileting, grooming, dressing, etc )  - Assess/evaluate cause of self-care deficits   - Assess range of motion  - Assess patient's mobility  - Assess patient's need for assistive devices and provide as appropriate  - Encourage maximum independence but intervene and supervise when necessary  - Involve family in performance of ADLs  - Assess for home care needs following discharge   - Consider OT consult to assist with ADL evaluation and planning for discharge  - Provide patient education as appropriate  Outcome: Progressing  Goal: Maintain proper alignment of affected body part  Description  INTERVENTIONS:  - Support, maintain and protect limb and body alignment  - Provide patient/ family with appropriate education  Outcome: Progressing     Problem: NEUROSENSORY - ADULT  Goal: Achieves stable or improved neurological status  Description  INTERVENTIONS  - Monitor and report changes in neurological status  - Monitor vital signs such as temperature, blood pressure, glucose, and any other labs ordered   - Initiate measures to prevent increased intracranial pressure  - Monitor for seizure activity and implement precautions if appropriate      Outcome: Progressing

## 2020-07-03 NOTE — PLAN OF CARE
Problem: PHYSICAL THERAPY ADULT  Goal: Performs mobility at highest level of function for planned discharge setting  See evaluation for individualized goals  Description  Treatment/Interventions: Functional transfer training, LE strengthening/ROM, Therapeutic exercise, Endurance training, Cognitive reorientation, Patient/family training, Equipment eval/education, Bed mobility, Gait training, Spoke to nursing  Equipment Recommended: [place]       See flowsheet documentation for full assessment, interventions and recommendations  Outcome: Progressing  Note:   Prognosis: Good  Problem List: Decreased endurance, Impaired balance, Decreased mobility, Decreased cognition, Decreased safety awareness, Impaired judgement, Decreased coordination  Assessment: Patient seen today for physical therapy session on education, bed mobility, functional transfers, and gait training  Patient updated to WBAT in RLE with surgical shoe; therapist provided patient with a surgical shoe and educated patient to wear at all times for OOB mobility  Patient more appropriate today and however continues to have a flat affect  Requires significant time for processing and response time  Overall, patient is making steady progress towards therapy goals and improved activity tolerance noted  Pt does require multiple rest breaks and increased WOB observed during functional mobility  Ambulation distance self-limited by patient 2* fatigue  Recommend sTR upon discharge once medically stable  PT Discharge Recommendation: Post-Acute Rehabilitation Services     PT - OK to Discharge: Yes(to STR once medically stable)    See flowsheet documentation for full assessment

## 2020-07-06 LAB — HFE GENE MUT ANL BLD/T: NORMAL

## 2020-07-07 ENCOUNTER — TELEPHONE (OUTPATIENT)
Dept: NEPHROLOGY | Facility: CLINIC | Age: 75
End: 2020-07-07

## 2020-07-07 NOTE — TELEPHONE ENCOUNTER
----- Message from Kingston Payton MD sent at 7/2/2020  2:20 PM EDT -----  Patient was seen for acute kidney injury  Still in the hospital but be signed off  I have ordered for BMP to be done in 1 week  Please arrange for outpatient follow-up with an advanced practitioner in 1-2 weeks of discharge

## 2020-07-07 NOTE — TELEPHONE ENCOUNTER
Called patient to [facility], was unable to reach or leave a voicemail   Will try again at a later time

## 2020-07-09 NOTE — TELEPHONE ENCOUNTER
Called patient, the number on file is no longer in service  I will be mailing out a letter to the patient

## 2021-02-12 DIAGNOSIS — Z23 ENCOUNTER FOR IMMUNIZATION: ICD-10-CM

## 2022-08-15 NOTE — ASSESSMENT & PLAN NOTE
· CT chest with bilateral centrally oriented alveolar opacities   · Opacities mainly due to volume overload however has elevated procalcitonin and leukocytosis  · Was found unresponsive - concern for aspiration  · Ct Cefepime/metronidazole through tomorrow and then stop    · MRSA swab negative  · Monitor temperature, WBC  · Trend procalcitonin left ear foreign body

## 2023-03-28 NOTE — ASSESSMENT & PLAN NOTE
OT Re-Evaluation     Today's date: 3/28/2023  Patient name: Su Rodrigues  : 1979  MRN: [id number]  Referring provider: Sukhdev Evans MD  Dx:   Encounter Diagnosis     ICD-10-CM    1  Cubital tunnel syndrome, right  G56 21       2  Carpal tunnel syndrome on right  G56 01                      Assessment  Assessment details: Su Rodrigues is a 53 y o , Right HD female referred to hand therapy for a right carpal and cubital tunnel release on 2/10/23  Onset of symptoms over 8 years ago  Patient presents 21 with impaired elbow and wrist ROM, strength  Sensation of the median and ulnar nerves is now intact  Deficits also noted in scar hypersensitivity, edema and functional use of the right UE  Patient has scars on the right volar wrist and medial elbow that are healing well, but with mild adherence and hypersensitivity  Patient is a good candidate for OT services to abolish pain and edema and restore ROM, strength, coordination for a return to independence in daily tasks  3/28/23: Patient has been seen 5 times in OT  She demonstrates improved function, but FOTO goal not fully met  ROM is WFL  Initial strength measurements indicate good pinch strength, but deficits in , forearm, and elbow strength noted  Hypersensitivity has decreased, but not fully resolved  Patient is scheduled to return to work in 1 week and requests DC after 1-2 visits  Impairments: activity intolerance, impaired physical strength, lacks appropriate home exercise program, pain with function and weight-bearing intolerance  Other impairment: Scar hypersensitivity  Functional limitations: Using the RUE as a min to mod assist for IADLs  Symptom irritability: lowBarriers to therapy: Scar hypersensitivity; arthritis; chronic neck pain  Understanding of Dx/Px/POC: excellent   Prognosis: good    Goals  STGs (4 weeks)  Patient will be independent in HEP of ROM, scar management, edema management   MET  Patient will report · Has broken screw in right 2nd toe which is asymptomatic  · Was scheduled to have it removed by Dr Delbert Garcia   · Note podiatry input  Outpatient follow-up  an average pain level of 5/10  MET  Patient will demonstrate active wrist extension/flexion to 70/70  MET  Patient will demonstrate active elbow extension/flexion to 0/140  MET  Patient will report 30% reduction in scar hypersensitivity  MET  LTGs (12 weeks)  Patient will demonstrate independence in a HEP to maintain ROM, strength, and function at discharge  ON GOING  Patient will report an average pain level of 1-2/10 to be independent in daily tasks  NOT MET  Patient will demonstrate MCKEE of the digits to WNL to be independent in self care tasks  MET  Patient will demonstrate AROM of the wrist and forearm WFL to be independent in self care tasks  MET  Patient will demonstrate 5/5 muscle strength in the wrist, forearm, elbow to be MI for meal prep  MET for wrist only  Patient will demonstrate right hand strength within 30% of the left hand to be MI for IADL tasks  MET for pinch only  Patient will demonstrate resolution of edema  MET  Patient will demonstrate resolution scar hypersensitivity   NOT MET      Plan  Plan details: 3/2/23: Begin OT 2x/wk x 12 weeks  3/28/23: Continue OT 2x/wk x 1-4 weeks then DC to HEP  Patient would benefit from: skilled occupational therapy and custom splinting  Planned modality interventions: ultrasound, thermotherapy: hydrocollator packs and cryotherapy  Planned therapy interventions: activity modification, compression, fine motor coordination training, graded activity, graded exercise, home exercise program, therapeutic exercise, therapeutic activities, stretching, strengthening, patient education, orthotic fitting/training, neuromuscular re-education and manual therapy  Other planned therapy interventions: Desensitization; IASTM  Frequency: 2x week  Duration in weeks: 12  Plan of Care beginning date: 3/2/2023  Plan of Care expiration date: 6/2/2023  Treatment plan discussed with: patient        Subjective Evaluation    History of Present Illness  Date of surgery: 2/10/2023  Mechanism of injury: surgery  Mechanism of injury: Patient reports onset of carpal tunnel and cubital tunnel symptoms over 8 years  She had a course of therapy, night time splints, and CSI, but symptoms persisted  She had right carpal and cutiatl tunnel releases on 2/10/23  Patient is now reporting pain on scars  Patient presents for OT evaluation and treatment  3/28/23: Patient reports her elbow and hand are feeling better, though still a little sensitive  She is returning to work as a supervisor  Patient is slowly beginning to use hand for home management tasks          Recurrent probem    Quality of life: excellent    Pain  Current pain rating: 3 (Hand 3; elbow 5)  At best pain ratin  At worst pain ratin  Location: Medial right elbow at cubital tunnel release scar  Some pain at CT scar  Quality: sharp  Alleviating factors: topical cream and massage  Exacerbated by: Trying to lift or resistive grasping  Moderate scar hypersensitivity  Progression: improved    Social Support  Lives with: spouse, adult children and young children    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)  Hand dominance: right    Treatments  Previous treatment: occupational therapy, injection treatment and immobilization  Current treatment: occupational therapy  Patient Goals  Patient goals for therapy: decreased edema, decreased pain, increased motion, increased strength, independence with ADLs/IADLs and return to work          Objective     Observations     Right Elbow   Negative for adhesive scar and edema  Right Wrist/Hand   Negative for edema       Additional Observation Details  3/2/23: Hypersensitive scar  3/28/23: Still has some scar hypersensitivity and 3/2/23: Hypersensitive scar  3/28/23: Mild hypersensitivity    Neurological Testing     Sensation     Wrist/Hand     Right   Intact: light touch    Comments   Right light touch: 2 83 ( WNL) on John  Monofilaments    Active Range of Motion     Right Elbow   Flexion: 145 degrees WFL  Extension: 180 degrees WFL  Forearm supination: WFL  Forearm pronation: WFL    Right Wrist   Wrist flexion: 70 degrees WFL  Wrist extension: 70 degrees WFl  Radial deviation: 25 degrees WFL  Ulnar deviation: 35 degrees WFL    Right Thumb   Opposition: 3/2/23: WNL    Additional Active Range of Motion Details  3/23/23: WNL    Strength/Myotome Testing     Right Elbow   Flexion: 4+  Extension: 4+  Forearm supination: 4+  Forearm pronation: 4+    Left Wrist/Hand      (2nd hand position)     Trial 1: 80    Thumb Strength  Key/Lateral Pinch     Trial 1: 15  Tip/Two-Point Pinch     Trial 1: 9  Palmar/Three-Point Pinch     Trial 1: 12    Right Wrist/Hand   Normal wrist strength     (2nd hand position)     Trial 1: 45    Thumb Strength   Key/Lateral Pinch     Trial 1: 17  Tip/Two-Point Pinch     Trial 1: 7  Palmar/Three-Point Pinch     Trial 1: 11    Swelling   Left Elbow Girth Measurements   Joint line: 26 cm    Right Elbow Girth Measurements   Joint line: 26 cm    Left Wrist/Hand   Circumference MCP: 18 5 cm  Circumference wrist: 14 8 cm    Right Wrist/Hand   Circumference MCP: 18 5 cm  Circumference wrist: 14 8 cm             Precautions: Right CTR and cubital tunnel release 2/10/23  Chronic neck pain   Access Code: [email or web address]  URL: [email or web address]  Date: 03/02/2023  Prepared by: Barb Carrascoh    Exercises  • Standing Wrist Flexion Stretch - 3 x daily - 7 x weekly - 1 sets - 10 reps - 5 hold  • Standing Wrist Extension Stretch - 3 x daily - 7 x weekly - 1 sets - 10 reps - 5 hold  • Elbow AROM Flexion & Extension Neutral Forearm - 3 x daily - 7 x weekly - 1 sets - 10 reps  • Standing Forearm Pronation and Supination AROM - 3 x daily - 7 x weekly - 1 sets - 10 reps    Patient Education  • Carpal Tunnel Syndrome      Date 3/2/23 3/9/23 3/16/23 3/21/23 3/28/23   Visit  1 2 3 4 5 RE   Manuals        Scar massage 8' + elastogel, tg over scars 10' ( 3' elbow, 5' wrist) 10' ( 5' ea scar) 10' (5' ea scar) + elastogel and tg 8' (4' ea scar)   Kinesio tape  3'                      Neuro Re-Ed                                                                 Ther Ex        A/AAROM wrist x10 x20 x20 x10 x10   A/AAROM FA x10 x20 x20 x10 x10   A/AAROM elbow x10 x20 x20 x10 x10   Cones p/s        Cones elbow e/f        OH pulley for elbow ROM  x20      Wrist maze  x10      Wrist PREs e/f, dt   2# x 10 ea 2# x 20 ea 2# x 30 ea   FA PREs   2# x 10 2# x 20 2# x 30    Elbow e/f    3# x 20 3# x 30   HG   25# x20 35# x 20 Red theraputty 5'   Pinch strength   R,R on PW x 1 set G,G on PW 2 sets Red theraputty 5'           Ther Activity        Translation  Marbles 1 set  Marbles 1 set Marbles 1 set           HEP Scar massage, AROM elbow, FA,wrist; elastogel and tg for wrist and elbow Kinesio tape wear, care, precautions  Elastogel wear, care, precautions, desensitization with different textures POC; theraputty for hand strengthening                   Modalities        [facility] 5' 5' 5' 5' 5'